# Patient Record
Sex: MALE | Race: WHITE | NOT HISPANIC OR LATINO | Employment: FULL TIME | URBAN - METROPOLITAN AREA
[De-identification: names, ages, dates, MRNs, and addresses within clinical notes are randomized per-mention and may not be internally consistent; named-entity substitution may affect disease eponyms.]

---

## 2017-12-27 ENCOUNTER — OFFICE VISIT (OUTPATIENT)
Dept: FAMILY MEDICINE CLINIC | Facility: CLINIC | Age: 67
End: 2017-12-27

## 2017-12-27 VITALS
SYSTOLIC BLOOD PRESSURE: 122 MMHG | HEART RATE: 96 BPM | DIASTOLIC BLOOD PRESSURE: 70 MMHG | TEMPERATURE: 98.2 F | WEIGHT: 223 LBS | OXYGEN SATURATION: 98 % | BODY MASS INDEX: 30.24 KG/M2

## 2017-12-27 DIAGNOSIS — J10.1 INFLUENZA A: Primary | ICD-10-CM

## 2017-12-27 DIAGNOSIS — F17.210 CIGARETTE SMOKER: ICD-10-CM

## 2017-12-27 PROCEDURE — 99213 OFFICE O/P EST LOW 20 MIN: CPT | Performed by: FAMILY MEDICINE

## 2017-12-27 RX ORDER — OSELTAMIVIR PHOSPHATE 75 MG/1
75 CAPSULE ORAL 2 TIMES DAILY
Qty: 10 CAPSULE | Refills: 0 | Status: SHIPPED | OUTPATIENT
Start: 2017-12-27 | End: 2018-10-16

## 2017-12-27 NOTE — PROGRESS NOTES
HPI  Gallo Leary is a 67 y.o. male who is here for follow up for day history of cough congestion severe body aches.  Is unaware of any definite fever or chills.  No known exposure to influenza or strep infections.  Continues to smoke but has significantly decreased to one pack every 3-4 days?      Review of Systems   Constitutional: Positive for fatigue. Negative for chills, diaphoresis and fever.   HENT: Positive for congestion and sore throat.    Respiratory: Positive for cough.    Musculoskeletal: Positive for myalgias.   Neurological: Positive for weakness.         No past medical history on file.    No past surgical history on file.    No family history on file.    Social History     Social History   • Marital status:      Spouse name: N/A   • Number of children: N/A   • Years of education: N/A     Occupational History   • Not on file.     Social History Main Topics   • Smoking status: Not on file   • Smokeless tobacco: Not on file   • Alcohol use Not on file   • Drug use: Not on file   • Sexual activity: Not on file     Other Topics Concern   • Not on file     Social History Narrative         Physical Exam   Constitutional: He is oriented to person, place, and time. He appears well-developed and well-nourished.   HENT:   Head: Normocephalic.   Mouth/Throat: Oropharynx is clear and moist.   Eyes: Conjunctivae are normal. Pupils are equal, round, and reactive to light.   Neck: Neck supple.   Cardiovascular: Normal rate and regular rhythm.    Pulmonary/Chest: Effort normal and breath sounds normal.   Abdominal: Soft.   Musculoskeletal: He exhibits no edema or deformity.   Lymphadenopathy:     He has no cervical adenopathy.   Neurological: He is alert and oriented to person, place, and time. Coordination normal.   Skin: Skin is warm and dry. No rash noted.   Psychiatric: He has a normal mood and affect. His behavior is normal. Judgment and thought content normal.   Nursing note and vitals  reviewed.        Assessment/Plan    Gallo was seen today for influenza, altered mental status and hearing problem.    Diagnoses and all orders for this visit:    Influenza A  -     oseltamivir (TAMIFLU) 75 MG capsule; Take 1 capsule by mouth 2 (Two) Times a Day.    Cigarette smoker      Patient here with cough congestion and severe body aches which started 4-5 days ago.  Examination is fairly unremarkable and specifically lungs are clear.  Test is positive for influenza a and will give medications noted above.  Otherwise recommend fluids rest and symptomatic therapy, call if worsens or symptoms persist.  Also recommend discontinuing cigarette use.    This note includes information entered using a voice recognition dictation system.  Though reviewed, some nonsensible errors may remain.

## 2018-10-16 ENCOUNTER — OFFICE VISIT (OUTPATIENT)
Dept: FAMILY MEDICINE CLINIC | Facility: CLINIC | Age: 68
End: 2018-10-16

## 2018-10-16 VITALS
BODY MASS INDEX: 29.8 KG/M2 | TEMPERATURE: 97.5 F | HEIGHT: 72 IN | HEART RATE: 72 BPM | DIASTOLIC BLOOD PRESSURE: 80 MMHG | SYSTOLIC BLOOD PRESSURE: 130 MMHG | OXYGEN SATURATION: 99 % | WEIGHT: 220 LBS

## 2018-10-16 DIAGNOSIS — H61.23 BILATERAL IMPACTED CERUMEN: Primary | ICD-10-CM

## 2018-10-16 PROCEDURE — 99212 OFFICE O/P EST SF 10 MIN: CPT | Performed by: FAMILY MEDICINE

## 2018-10-16 NOTE — PROGRESS NOTES
HPI  Gallo Leary is a 68 y.o. male who is here for follow up blockage in both ear canals.  He has had history of excess wax and admits to use of Q-tips occasionally removing large amounts of wax.  Also has some skin lesions on his face and ask about having removed.  Recommend dermatology consultation.      Review of Systems   All other systems reviewed and are negative.        No past medical history on file.    No past surgical history on file.    No family history on file.    Social History     Social History   • Marital status:      Spouse name: N/A   • Number of children: N/A   • Years of education: N/A     Occupational History   • Not on file.     Social History Main Topics   • Smoking status: Not on file   • Smokeless tobacco: Not on file   • Alcohol use Not on file   • Drug use: Unknown   • Sexual activity: Not on file     Other Topics Concern   • Not on file     Social History Narrative   • No narrative on file         Physical Exam   Constitutional: He appears well-developed and well-nourished. No distress.   HENT:   Head: Normocephalic.   Nose: Nose normal.   Mouth/Throat: Oropharynx is clear and moist.   Both ear canals occluded with cerumen   Eyes: Pupils are equal, round, and reactive to light. Conjunctivae are normal.   Cardiovascular: Normal rate.    Pulmonary/Chest: Effort normal. No respiratory distress.   Psychiatric: He has a normal mood and affect. His behavior is normal. Judgment and thought content normal.   Nursing note and vitals reviewed.        Assessment/Plan    Gallo was seen today for ear fullness.    Diagnoses and all orders for this visit:    Bilateral impacted cerumen      Patient presents with ear canals both occluded with cerumen.  Canals irrigated by medical assistance with removal of large amounts of cerumen.  Return if any further difficulties and also should return for routine medical evaluation.    This note includes information entered using a voice recognition  dictation system.  Though reviewed, some nonsensible errors may remain.

## 2019-09-17 ENCOUNTER — OFFICE VISIT (OUTPATIENT)
Dept: FAMILY MEDICINE CLINIC | Facility: CLINIC | Age: 69
End: 2019-09-17

## 2019-09-17 VITALS
TEMPERATURE: 97.8 F | DIASTOLIC BLOOD PRESSURE: 68 MMHG | RESPIRATION RATE: 16 BRPM | HEART RATE: 72 BPM | WEIGHT: 218.4 LBS | SYSTOLIC BLOOD PRESSURE: 120 MMHG | HEIGHT: 72 IN | OXYGEN SATURATION: 98 % | BODY MASS INDEX: 29.58 KG/M2

## 2019-09-17 DIAGNOSIS — F17.210 CIGARETTE SMOKER: ICD-10-CM

## 2019-09-17 DIAGNOSIS — M25.512 ACUTE PAIN OF LEFT SHOULDER: Primary | ICD-10-CM

## 2019-09-17 PROBLEM — M54.50 ACUTE LOW BACK PAIN: Status: ACTIVE | Noted: 2019-09-17

## 2019-09-17 PROBLEM — H61.20 CERUMEN IMPACTION: Status: ACTIVE | Noted: 2019-09-17

## 2019-09-17 PROCEDURE — 99213 OFFICE O/P EST LOW 20 MIN: CPT | Performed by: FAMILY MEDICINE

## 2019-09-17 RX ORDER — METHYLPREDNISOLONE 4 MG/1
TABLET ORAL
Qty: 21 TABLET | Refills: 0 | Status: SHIPPED | OUTPATIENT
Start: 2019-09-17 | End: 2019-10-07

## 2019-09-17 RX ORDER — CYCLOBENZAPRINE HCL 10 MG
TABLET ORAL
Qty: 30 TABLET | Refills: 2 | Status: SHIPPED | OUTPATIENT
Start: 2019-09-17 | End: 2020-05-08

## 2019-09-17 RX ORDER — CYCLOBENZAPRINE HCL 10 MG
TABLET ORAL
Refills: 0 | COMMUNITY
Start: 2019-06-27 | End: 2019-09-17

## 2019-09-17 RX ORDER — IBUPROFEN 800 MG/1
TABLET ORAL
Refills: 0 | COMMUNITY
Start: 2019-06-27 | End: 2019-10-07 | Stop reason: ALTCHOICE
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
## 2019-09-18 ENCOUNTER — HOSPITAL ENCOUNTER (OUTPATIENT)
Dept: GENERAL RADIOLOGY | Facility: HOSPITAL | Age: 69
Discharge: HOME | End: 2019-09-18
Admitting: FAMILY MEDICINE

## 2019-09-18 DIAGNOSIS — M25.512 ACUTE PAIN OF LEFT SHOULDER: ICD-10-CM

## 2019-09-18 PROCEDURE — 73030 X-RAY EXAM OF SHOULDER: CPT

## 2019-09-20 DIAGNOSIS — M25.511 ACUTE PAIN OF RIGHT SHOULDER: Primary | ICD-10-CM

## 2019-09-20 DIAGNOSIS — M25.512 ACUTE PAIN OF LEFT SHOULDER: ICD-10-CM

## 2019-09-20 RX ORDER — HYDROCODONE BITARTRATE AND ACETAMINOPHEN 5; 325 MG/1; MG/1
TABLET ORAL
Qty: 40 TABLET | Refills: 0 | Status: SHIPPED | OUTPATIENT
Start: 2019-09-20 | End: 2019-10-07 | Stop reason: ALTCHOICE

## 2019-10-07 ENCOUNTER — TELEPHONE (OUTPATIENT)
Dept: FAMILY MEDICINE CLINIC | Facility: CLINIC | Age: 69
End: 2019-10-07

## 2019-10-07 DIAGNOSIS — M25.512 LEFT SHOULDER PAIN, UNSPECIFIED CHRONICITY: ICD-10-CM

## 2019-10-07 DIAGNOSIS — M25.512 LEFT SHOULDER PAIN, UNSPECIFIED CHRONICITY: Primary | ICD-10-CM

## 2019-10-07 DIAGNOSIS — M25.512 ACUTE PAIN OF LEFT SHOULDER: ICD-10-CM

## 2019-10-07 RX ORDER — DICLOFENAC SODIUM 75 MG/1
75 TABLET, DELAYED RELEASE ORAL 2 TIMES DAILY
Qty: 30 TABLET | Refills: 1 | Status: SHIPPED | OUTPATIENT
Start: 2019-10-07 | End: 2020-05-08

## 2019-10-07 RX ORDER — HYDROCODONE BITARTRATE AND ACETAMINOPHEN 5; 325 MG/1; MG/1
TABLET ORAL
Qty: 40 TABLET | Refills: 0 | Status: SHIPPED | OUTPATIENT
Start: 2019-10-07 | End: 2019-10-16 | Stop reason: ALTCHOICE

## 2019-10-16 ENCOUNTER — TELEPHONE (OUTPATIENT)
Dept: FAMILY MEDICINE CLINIC | Facility: CLINIC | Age: 69
End: 2019-10-16

## 2019-10-16 DIAGNOSIS — M25.519 SHOULDER PAIN, UNSPECIFIED CHRONICITY, UNSPECIFIED LATERALITY: Primary | ICD-10-CM

## 2019-10-16 RX ORDER — HYDROCODONE BITARTRATE AND ACETAMINOPHEN 10; 325 MG/1; MG/1
1 TABLET ORAL EVERY 4 HOURS PRN
Qty: 40 TABLET | Refills: 0 | Status: SHIPPED | OUTPATIENT
Start: 2019-10-16 | End: 2019-10-29 | Stop reason: SDUPTHER

## 2019-10-16 NOTE — TELEPHONE ENCOUNTER
Previously had to contact patient through Opal, his ex-wife.  Prescription already sent and assume patient will call if he has any further questions?

## 2019-10-16 NOTE — TELEPHONE ENCOUNTER
ZION UPDATED.    Phoned pt and message came up as person your trying to reach has a voice mail that hasn't been set up yet, try your call again later.    Thank you.    ----- Message from Yunier Dominguez MD sent at 10/16/2019 11:52 AM EDT -----  inform patient prescription sent for 10 mg dose.  ----- Message -----  From: Michaela Caro  Sent: 10/16/2019   8:57 AM  To: Yunier Dominguez MD    PT WANTS TO KNOW IF YOU COULD GIVE HIM 7.5 0R 10MG HYDROCODONE HE CAN NOT SLEEP.   HE ALMOST DID NOT MAKE IT IN THE MRI IT WAS HURTING SO BAD.  PHARM#[phone] [facility]  PT'S#[phone]

## 2019-10-29 ENCOUNTER — TELEPHONE (OUTPATIENT)
Dept: FAMILY MEDICINE CLINIC | Facility: CLINIC | Age: 69
End: 2019-10-29

## 2019-10-29 DIAGNOSIS — M25.519 SHOULDER PAIN, UNSPECIFIED CHRONICITY, UNSPECIFIED LATERALITY: ICD-10-CM

## 2019-10-29 RX ORDER — HYDROCODONE BITARTRATE AND ACETAMINOPHEN 10; 325 MG/1; MG/1
1 TABLET ORAL EVERY 4 HOURS PRN
Qty: 40 TABLET | Refills: 0 | Status: SHIPPED | OUTPATIENT
Start: 2019-10-29 | End: 2019-11-08 | Stop reason: SDUPTHER

## 2019-10-29 NOTE — TELEPHONE ENCOUNTER
NO NARC FORMS IN CHART.    No future O.V.    Last O.V. 09/17/2019.  Jeronimo 10/14/2019.  Filled 10/16/2019.        ----- Message from Michaela Caro sent at 10/29/2019  9:18 AM EDT -----  PT NEEDS A REFILL ON  #HYDROCODONE 10/325#40  PHARM#[phone] [facility]  PT'S#[phone]

## 2019-11-08 ENCOUNTER — TELEPHONE (OUTPATIENT)
Dept: FAMILY MEDICINE CLINIC | Facility: CLINIC | Age: 69
End: 2019-11-08

## 2019-11-08 DIAGNOSIS — M25.519 SHOULDER PAIN, UNSPECIFIED CHRONICITY, UNSPECIFIED LATERALITY: ICD-10-CM

## 2019-11-08 RX ORDER — HYDROCODONE BITARTRATE AND ACETAMINOPHEN 10; 325 MG/1; MG/1
1 TABLET ORAL EVERY 4 HOURS PRN
Qty: 40 TABLET | Refills: 0 | Status: SHIPPED | OUTPATIENT
Start: 2019-11-08 | End: 2019-11-18 | Stop reason: SDUPTHER

## 2019-11-08 NOTE — TELEPHONE ENCOUNTER
NO NARC FORMS IN CHART.     No future O.V.     Last O.V. 09/17/2019.  Jeronimo 10/14/2019.  Filled 10/29/2019.    Thank you.       ----- Message from Tracey Moncada sent at 11/8/2019  9:23 AM EST -----  HYDROcodone-acetaminophen (NORCO)  MG per tablet  Sig: Take 1 tablet by mouth Every 4 (Four) Hours As Needed for Moderate Pain  or Severe Pain .      Pharmacy:  [id number] [address] [place] AT [facility] & [facility] - [phone] [facility] [phone] FX

## 2019-11-18 ENCOUNTER — TELEPHONE (OUTPATIENT)
Dept: FAMILY MEDICINE CLINIC | Facility: CLINIC | Age: 69
End: 2019-11-18

## 2019-11-18 DIAGNOSIS — M25.519 SHOULDER PAIN, UNSPECIFIED CHRONICITY, UNSPECIFIED LATERALITY: ICD-10-CM

## 2019-11-18 RX ORDER — HYDROCODONE BITARTRATE AND ACETAMINOPHEN 10; 325 MG/1; MG/1
1 TABLET ORAL EVERY 4 HOURS PRN
Qty: 40 TABLET | Refills: 0 | Status: SHIPPED | OUTPATIENT
Start: 2019-11-18 | End: 2019-12-04 | Stop reason: SDUPTHER

## 2019-11-22 ENCOUNTER — OFFICE VISIT (OUTPATIENT)
Dept: ORTHOPEDIC SURGERY | Facility: CLINIC | Age: 69
End: 2019-11-22

## 2019-11-22 VITALS — BODY MASS INDEX: 30.1 KG/M2 | TEMPERATURE: 98.5 F | WEIGHT: 215 LBS | HEIGHT: 71 IN

## 2019-11-22 DIAGNOSIS — M25.519 NECK AND SHOULDER PAIN: Primary | ICD-10-CM

## 2019-11-22 DIAGNOSIS — M54.2 NECK AND SHOULDER PAIN: Primary | ICD-10-CM

## 2019-11-22 PROCEDURE — 99203 OFFICE O/P NEW LOW 30 MIN: CPT | Performed by: ORTHOPAEDIC SURGERY

## 2019-11-22 RX ORDER — MELOXICAM 15 MG/1
15 TABLET ORAL DAILY PRN
Qty: 30 TABLET | Refills: 2 | Status: SHIPPED | OUTPATIENT
Start: 2019-11-22 | End: 2020-05-08

## 2019-11-22 NOTE — PROGRESS NOTES
Patient: Gallo Leary    YOB: 1950    Medical Record Number: [id number]    Chief Complaints:  Left arm pain    History of Present Illness:     69 y.o. male patient who presents for evaluation of a new complaint of left arm pain.  Patient reports that the symptoms began about a month ago.  Denies any injury, precipitating event or factor.  He reports pain from his neck and upper arm all the way down into his hand.  He reports occasional tingling as well.  He has not noticed any alleviating factors.  He tried Voltaren but it did not really seem to help.  He did have an MRI of his shoulder.  He presents with that available for review.    Allergies: No Known Allergies    Home Medications:      Current Outpatient Medications:   •  HYDROcodone-acetaminophen (NORCO)  MG per tablet, Take 1 tablet by mouth Every 4 (Four) Hours As Needed for Moderate Pain  or Severe Pain ., Disp: 40 tablet, Rfl: 0  •  cyclobenzaprine (FLEXERIL) 10 MG tablet, 1/2-1 tab po qhs, Disp: 30 tablet, Rfl: 2  •  diclofenac (VOLTAREN) 75 MG EC tablet, Take 1 tablet by mouth 2 (Two) Times a Day., Disp: 30 tablet, Rfl: 1  •  meloxicam (MOBIC) 15 MG tablet, Take 1 tablet by mouth Daily As Needed for Moderate Pain . Take as directed with food., Disp: 30 tablet, Rfl: 2    History reviewed. No pertinent past medical history.    History reviewed. No pertinent surgical history.    Social History     Occupational History   • Not on file   Tobacco Use   • Smoking status: Current Every Day Smoker   • Smokeless tobacco: Never Used   Substance and Sexual Activity   • Alcohol use: Yes     Comment: very seldom 1 or twice a year.   • Drug use: No   • Sexual activity: Not Currently      Social History     Social History Narrative   • Not on file       History reviewed. No pertinent family history.    Review of Systems:      Constitutional: Denies fever, shaking or chills   Eyes: Denies change in visual acuity   HEENT: Denies nasal  "congestion or sore throat   Respiratory: Denies cough or shortness of breath   Cardiovascular: Denies chest pain or edema  Endocrine: Denies tremors, palpitations, intolerance of heat or cold, polyuria, polydipsia.  GI: Denies abdominal pain, nausea, vomiting, bloody stools or diarrhea  : Denies frequency, urgency, incontinence, retention, or nocturia.  Musculoskeletal: Denies numbness, tingling or loss of motor function except as above  Integument: Denies rash, lesion or ulceration   Neurologic: Denies headache or focal weakness, deficits  Heme: Denies spontaneous or excessive bleeding, epistaxis, hematuria, melena, fatigue, enlarged or tender lymph nodes.      All other pertinent positives and negatives as noted above in HPI.    Physical Exam: 69 y.o. male    Vitals:    11/22/19 1548   Temp: 98.5 °F (36.9 °C)   TempSrc: Temporal   Weight: 97.5 kg (215 lb)   Height: 180.3 cm (71\")       General:  Patient is awake and alert.  Appears in no acute distress or discomfort.    Psych:  Affect and demeanor are appropriate.    Eyes:  Conjunctiva and sclera appear grossly normal.  Eyes track well and EOM seem to be intact.    Ears:  No gross abnormalities.  Hearing adequate for the exam.    Cardiovascular:  Regular rate and rhythm.    Lungs:  Good chest expansion.  Breathing unlabored.    Lymph:  No palpable masses or adenopathy    Neck: Skin is benign.  No tenderness or step-offs.  His motion is a little limited.  Rotation and lateral flexion are mildly uncomfortable.  Positive Spurling's maneuver to the left    Extremities:  Left shoulder:  Skin is benign.  No gross abnormalities on inspection.  No palpable masses or adenopathy.  No effusion.  No significant tenderness.  Full symmetric motion.  No instability.  Negative Neer, Pérez, Speed's, Yergason's, active compression and O'Briens maneuvers.  He has weakness with shoulder abduction, forward elevation and external rotation.  Mild discomfort with resisted elevation.  " He has totally normal motor sensation in his wrist and hand.  Sensation is intact and subjectively normal.  Palpable radial pulse.  Brisk capillary refill in the fingers with good skin turgor.         Radiology:   Outside x-rays including AP and orthogonal views of the left shoulder are reviewed to evaluate the patient's complaint.  No comparison films are immediately available.  The x-rays show no obvious acute abnormalities, lesions, masses, significant degenerative changes, or other concerning findings.  The acromiohumeral interval is normal.  Glenoid version appears normal as well.    MRI of the left shoulder is reviewed along with the associated report.  He has evidence for a low-grade partial-thickness subscapularis tear.  There is diffuse rotator cuff tendinopathy and mild acromioclavicular arthritis.  No other significant findings are noted.    Assessment/Plan:  1.  Cervical radiculopathy  2.  Left rotator cuff tendinopathy    He has a lot of weakness of his shoulder on exam, much more so than I would expect based on his exam.  Furthermore, his weakness does not seem to really be related to discomfort.  He has a little bit of discomfort with elevation but no significant discomfort with abduction or external rotation, both of which are very weak.  I think this is likely coming from his neck.  We talked about options for him including a trial of conservative treatment versus further work-up with an MRI and a possible referral to Dr. Rader.  For now, he is in no hurry to pursue further work-up.  He would like to try some medicine and therapy first.  I think that is reasonable.  I gave him a prescription for meloxicam to take as needed.  I will have him try that instead of the Voltaren.  Risks were discussed.  I also gave him a referral to therapy.  We will see how he does.  I am going to have him follow-up with me in 6 weeks to check his progress.  If things worsen before then, he needs to notify me and for  further work-up.    Tien José MD    11/22/2019    CC to Yunier Dominguez MD

## 2019-12-04 ENCOUNTER — TELEPHONE (OUTPATIENT)
Dept: FAMILY MEDICINE CLINIC | Facility: CLINIC | Age: 69
End: 2019-12-04

## 2019-12-04 DIAGNOSIS — M25.519 SHOULDER PAIN, UNSPECIFIED CHRONICITY, UNSPECIFIED LATERALITY: ICD-10-CM

## 2019-12-04 RX ORDER — HYDROCODONE BITARTRATE AND ACETAMINOPHEN 10; 325 MG/1; MG/1
1 TABLET ORAL EVERY 4 HOURS PRN
Qty: 40 TABLET | Refills: 0 | Status: SHIPPED | OUTPATIENT
Start: 2019-12-04 | End: 2019-12-12 | Stop reason: SDUPTHER

## 2019-12-09 ENCOUNTER — OFFICE VISIT (OUTPATIENT)
Dept: FAMILY MEDICINE CLINIC | Facility: CLINIC | Age: 69
End: 2019-12-09

## 2019-12-09 VITALS
SYSTOLIC BLOOD PRESSURE: 138 MMHG | WEIGHT: 221 LBS | BODY MASS INDEX: 29.93 KG/M2 | OXYGEN SATURATION: 97 % | HEART RATE: 73 BPM | HEIGHT: 72 IN | RESPIRATION RATE: 16 BRPM | DIASTOLIC BLOOD PRESSURE: 78 MMHG | TEMPERATURE: 97.7 F

## 2019-12-09 DIAGNOSIS — F17.210 CIGARETTE SMOKER: ICD-10-CM

## 2019-12-09 DIAGNOSIS — M54.12 CERVICAL RADICULOPATHY: Primary | ICD-10-CM

## 2019-12-09 PROCEDURE — 99213 OFFICE O/P EST LOW 20 MIN: CPT | Performed by: FAMILY MEDICINE

## 2019-12-09 NOTE — PROGRESS NOTES
HPI  Gallo Leary is a 69 y.o. male who is here for follow up of continued left shoulder pain down all the way to the left hand especially index finger and thumb.  Was seen by orthopedic surgeon who felt pain most likely coming out of his neck.  Continues to work extremely hard at his current job and says he will be forced to retire in the near future.  Continues to smoke though has significantly decreased.  Discussed concerns regarding addiction to pain medication.      Review of Systems   HENT: Positive for ear pain, postnasal drip and rhinorrhea.    Gastrointestinal: Positive for constipation.   Musculoskeletal: Positive for back pain, myalgias and neck pain.   All other systems reviewed and are negative.        No past medical history on file.    No past surgical history on file.    No family history on file.    Social History     Socioeconomic History   • Marital status:      Spouse name: Not on file   • Number of children: Not on file   • Years of education: Not on file   • Highest education level: Not on file   Tobacco Use   • Smoking status: Current Every Day Smoker   • Smokeless tobacco: Never Used   Substance and Sexual Activity   • Alcohol use: Yes     Comment: very seldom 1 or twice a year.   • Drug use: No   • Sexual activity: Not Currently         Physical Exam   Constitutional: He is oriented to person, place, and time. He appears well-developed and well-nourished. No distress.   HENT:   Head: Normocephalic and atraumatic.   Nose: Nose normal.   Mouth/Throat: Oropharynx is clear and moist. No oropharyngeal exudate.   Eyes: Pupils are equal, round, and reactive to light. Conjunctivae and EOM are normal.   Neck: Normal range of motion.   Cardiovascular: Normal rate and regular rhythm.   Pulmonary/Chest: Effort normal. No respiratory distress.   Abdominal: Soft. He exhibits no distension.   Musculoskeletal: Normal range of motion. He exhibits no edema or deformity.   Neurological: He is  alert and oriented to person, place, and time. He exhibits normal muscle tone. Coordination normal.   Skin: Skin is warm and dry.   Psychiatric: He has a normal mood and affect. His behavior is normal. Judgment and thought content normal.   Nursing note and vitals reviewed.        Assessment/Plan    Gallo was seen today for med refill.    Diagnoses and all orders for this visit:    Cervical radiculopathy  -     XR Spine Cervical 2 or 3 View; Future    Cigarette smoker  -     Ambulatory Referral to Multi-Disciplinary Clinic        Patient here for follow-up of continued left shoulder and arm pain.  Again strongly recommend screening chest CT with smoking history and also strong family history of cancer.  Recent orthopedic visit noted and feels most likely pain is coming from cervical radiculopathy.  Will get routine neck x-ray.  Patient is not sure he would tolerate MRI of the neck at this time.  Apparently all of this was discussed with orthopedist.  Again strongly recommend discontinuing cigarette use.  Otherwise continue to monitor closely especially regarding pain medication.  Patient aware of misuse abuse potential.  There is no evidence of either.    This note includes information entered using a voice recognition dictation system.  Though reviewed, some nonsensible errors may remain.

## 2019-12-12 ENCOUNTER — TELEPHONE (OUTPATIENT)
Dept: FAMILY MEDICINE CLINIC | Facility: CLINIC | Age: 69
End: 2019-12-12

## 2019-12-12 DIAGNOSIS — M25.519 SHOULDER PAIN, UNSPECIFIED CHRONICITY, UNSPECIFIED LATERALITY: ICD-10-CM

## 2019-12-12 RX ORDER — HYDROCODONE BITARTRATE AND ACETAMINOPHEN 10; 325 MG/1; MG/1
1 TABLET ORAL EVERY 4 HOURS PRN
Qty: 40 TABLET | Refills: 0 | Status: SHIPPED | OUTPATIENT
Start: 2019-12-12 | End: 2019-12-19 | Stop reason: SDUPTHER

## 2019-12-12 NOTE — TELEPHONE ENCOUNTER
PT NEEDS A REFILL ON:  *HYDROCODONE 10/325#40  PHARM#[id number] [facility]  PT'S#[phone]    LAST OFFICE VISIT 12-9-19  LAST ZION 10-14-19

## 2019-12-16 ENCOUNTER — HOSPITAL ENCOUNTER (OUTPATIENT)
Dept: PHYSICAL THERAPY | Facility: HOSPITAL | Age: 69
Setting detail: THERAPIES SERIES
Discharge: HOME | End: 2019-12-16

## 2019-12-16 DIAGNOSIS — M54.2 NECK PAIN: ICD-10-CM

## 2019-12-16 DIAGNOSIS — M25.512 ACUTE PAIN OF LEFT SHOULDER: Primary | ICD-10-CM

## 2019-12-16 PROCEDURE — 97110 THERAPEUTIC EXERCISES: CPT

## 2019-12-16 PROCEDURE — 97162 PT EVAL MOD COMPLEX 30 MIN: CPT
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
## 2019-12-19 ENCOUNTER — TELEPHONE (OUTPATIENT)
Dept: FAMILY MEDICINE CLINIC | Facility: CLINIC | Age: 69
End: 2019-12-19

## 2019-12-19 DIAGNOSIS — M25.519 SHOULDER PAIN, UNSPECIFIED CHRONICITY, UNSPECIFIED LATERALITY: ICD-10-CM

## 2019-12-19 RX ORDER — HYDROCODONE BITARTRATE AND ACETAMINOPHEN 10; 325 MG/1; MG/1
1 TABLET ORAL EVERY 4 HOURS PRN
Qty: 40 TABLET | Refills: 0 | Status: SHIPPED | OUTPATIENT
Start: 2019-12-19 | End: 2019-12-30 | Stop reason: SDUPTHER

## 2019-12-30 ENCOUNTER — TELEPHONE (OUTPATIENT)
Dept: FAMILY MEDICINE CLINIC | Facility: CLINIC | Age: 69
End: 2019-12-30

## 2019-12-30 DIAGNOSIS — M25.519 SHOULDER PAIN, UNSPECIFIED CHRONICITY, UNSPECIFIED LATERALITY: ICD-10-CM

## 2019-12-30 RX ORDER — HYDROCODONE BITARTRATE AND ACETAMINOPHEN 10; 325 MG/1; MG/1
1 TABLET ORAL EVERY 4 HOURS PRN
Qty: 40 TABLET | Refills: 0 | Status: SHIPPED | OUTPATIENT
Start: 2019-12-30 | End: 2020-01-10 | Stop reason: SDUPTHER

## 2020-01-06 ENCOUNTER — HOSPITAL ENCOUNTER (OUTPATIENT)
Dept: PHYSICAL THERAPY | Facility: HOSPITAL | Age: 70
Setting detail: THERAPIES SERIES
Discharge: HOME | End: 2020-01-06

## 2020-01-06 DIAGNOSIS — M25.512 ACUTE PAIN OF LEFT SHOULDER: Primary | ICD-10-CM

## 2020-01-06 DIAGNOSIS — M54.2 NECK PAIN: ICD-10-CM

## 2020-01-06 PROCEDURE — 97110 THERAPEUTIC EXERCISES: CPT

## 2020-01-06 PROCEDURE — 97140 MANUAL THERAPY 1/> REGIONS: CPT

## 2020-01-06 NOTE — THERAPY TREATMENT NOTE
Outpatient Physical Therapy Ortho Treatment Note  [facility]     Patient Name: Gallo Leary  : 1950  MRN: [id number]  Today's Date: 2020      Visit Date: 2020    Visit Dx:    ICD-10-CM ICD-9-CM   1. Acute pain of left shoulder M25.512 719.41   2. Neck pain M54.2 723.1       Patient Active Problem List   Diagnosis   • Cigarette smoker   • Acute low back pain   • Cerumen impaction        No past medical history on file.     No past surgical history on file.                    PT Assessment/Plan     Row Name 20 1638          PT Assessment    Functional Limitations  Limitation in home management;Performance in leisure activities;Limitations in community activities;Performance in self-care ADL;Limitations in functional capacity and performance  -CN     Impairments  Impaired flexibility;Sensation;Posture;Poor body mechanics;Range of motion;Pain;Joint mobility;Muscle strength  -CN     Assessment Comments  Pt returns for initial follow up after evaluation on 19. Pt reports minimal change in symptoms and intermittnet compliance with HEP. Pt requires many cues for posture and tends to return to forward head/shoulder position in sitting and standing. Pt reports continued numbness into L fingers as well as intermittnet difficulty sleeping. Pt tolerated reclined position today for manual and several exercises and added postural strengthening with cues for correct form. Pt also assisting in care for wife who is undergoing chemo/radiation and currently in ER. Pt would benefit from skilled PT to address the deficits and return to PLOF.   -CN     Please refer to paper survey for additional self-reported information  Yes  -CN     Rehab Potential  Good  -CN     Patient/caregiver participated in establishment of treatment plan and goals  Yes  -CN     Patient would benefit from skilled therapy intervention  Yes  -CN        PT Plan    PT Frequency  2x/week  -CN     Predicted Duration of Therapy  Intervention (Therapy Eval)  4 weeks  -CN     Planned CPT's?  PT RE-EVAL: 35304;PT THER PROC EA 15 MIN: 23877;PT THER ACT EA 15 MIN: 33768;PT MANUAL THERAPY EA 15 MIN: 48235;PT NEUROMUSC RE-EDUCATION EA 15 MIN: 63741;PT HOT OR COLD PACK TREAT MCARE;PT ELECTRICAL STIM UNATTEND: ;PT ULTRASOUND EA 15 MIN: 48308;PT TRACTION CERVICAL: 71523  -CN     PT Plan Comments  Pt to treat 1-2x/week for 4 weeks consisting of ROM, strengthening and flexibility exercises. Assess response to added exercises and update home program next visit.   -CN       User Key  (r) = Recorded By, (t) = Taken By, (c) = Cosigned By    Initials Name Provider Type    CN Karla John, PT Physical Therapist            OP Exercises     Row Name 01/06/20 1600 01/06/20 1500          Subjective Comments    Subjective Comments  It is more stiffness today than anything. I think they are sending my wife to the ER so I have to go there after this.   -CN  --        Subjective Pain    Able to rate subjective pain?  yes  -CN  --     Pre-Treatment Pain Level  5  -CN  --        Total Minutes    68911 - PT Therapeutic Exercise Minutes  25  -CN  --     83131 - PT Manual Therapy Minutes  --  15  -CN        Exercise 1    Exercise Name 1  shoulder rolls  -CN  --     Reps 1  10  -CN  --        Exercise 2    Exercise Name 2  scapular retraction  -CN  --     Reps 2  10  -CN  --     Time 2  3  -CN  --        Exercise 3    Exercise Name 3  HL chin tuck  -CN  --     Cueing 3  Demo  -CN  --     Reps 3  10  -CN  --     Time 3  3  -CN  --     Additional Comments  small range per pain  -CN  --        Exercise 4    Exercise Name 4  seated upper trap stretch  -CN  --     Reps 4  3  -CN  --     Time 4  20  -CN  --        Exercise 5    Exercise Name 5  HL chin tuck with rotation  -CN  --     Cueing 5  Demo  -CN  --     Reps 5  10 ea  -CN  --     Additional Comments  small range  -CN  --        Exercise 6    Exercise Name 6  UBE  -CN  --     Cueing 6  Demo  -CN  --      Time 6  4 min  -CN  --        Exercise 7    Exercise Name 7  Rows  -CN  --     Cueing 7  Demo  -CN  --     Reps 7  10  -CN  --     Additional Comments  RTB, cues for form  -CN  --        Exercise 8    Exercise Name 8  Shoulder ext  -CN  --     Cueing 8  Demo  -CN  --     Reps 8  10  -CN  --     Additional Comments  RTB  -CN  --        Exercise 9    Exercise Name 9  Doorway pec stretch  -CN  --     Cueing 9  Demo  -CN  --     Reps 9  3  -CN  --     Time 9   20 sec  -CN  --        Exercise 10    Exercise Name 10  B UE ER against wall with towel roll  -CN  --     Cueing 10  Demo  -CN  --     Reps 10  10  -CN  --     Additional Comments  RTB  -CN  --       User Key  (r) = Recorded By, (t) = Taken By, (c) = Cosigned By    Initials Name Provider Type    Karla Orellana, PT Physical Therapist                      Manual Rx (last 36 hours)      Manual Treatments     Row Name 01/06/20 1500             Total Minutes    06083 - PT Manual Therapy Minutes  15  -CN         Manual Rx 1    Manual Rx 1 Location  stm to UT, levator, suboccipitals, gentle UT stretching, gentle PA mobs to upper thoracic and cervical spine, distraction  -CN      Manual Rx 1 Duration  Pt in HL position with wedge and pillows  -CN        User Key  (r) = Recorded By, (t) = Taken By, (c) = Cosigned By    Initials Name Provider Type    Karla Orellana, PT Physical Therapist          PT OP Goals     Row Name 01/06/20 1600          PT Short Term Goals    STG Date to Achieve  12/30/19  -CN     STG 1  Pt will demonstrate understanding and compliance with initial HEP.  -CN     STG 1 Progress  Ongoing  -CN     STG 1 Progress Comments  Reviewed current program today, cues given for form.   -CN     STG 2  Pt will report centralization of symptoms from L fingers to L elbow or more proximal.  -CN     STG 2 Progress  Ongoing  -CN     STG 2 Progress Comments  Pt reports no change in peripheral symptoms to L fingers.   -CN        Long Term Goals     LTG Date to Achieve  01/15/20  -CN     LTG 1  Pt will demonstrate tolerance to supine position x 15 minutes without inc L shoulder pain to allow him to tolerate cervical traction.  -CN     LTG 1 Progress  Ongoing  -CN     LTG 1 Progress Comments  Pt able to tolerate reclined positon today.   -CN     LTG 2  Pt will report tolerance to sleeping without waking due to L shoulder pain.  -CN     LTG 2 Progress  Ongoing  -CN     LTG 2 Progress Comments  Pt reports slight improvement in ability to sleep.   -CN     LTG 3  Pt will demonstrate improved LUE  strength from 40# to 60# or better to improve his function.  -CN     LTG 3 Progress  Progressing  -CN     LTG 3 Progress Comments  Pt able to achieve 48# on the L today.   -CN       User Key  (r) = Recorded By, (t) = Taken By, (c) = Cosigned By    Initials Name Provider Type    Karla Orellana, NED Physical Therapist          Therapy Education  Given: HEP, Posture/body mechanics, Symptoms/condition management, Pain management  Program: Progressed  How Provided: Verbal, Demonstration  Provided to: Patient  Level of Understanding: Teach back education performed, Verbalized, Demonstrated              Time Calculation:   Start Time: 1600  Stop Time: 1640  Time Calculation (min): 40 min  Therapy Charges for Today     Code Description Service Date Service Provider Modifiers Qty    63175905970 HC PT THER PROC EA 15 MIN 1/6/2020 Karla John, PT GP 2    18224349422 HC PT MANUAL THERAPY EA 15 MIN 1/6/2020 Karla John, NED GP 1                    Karla John PT  1/6/2020

## 2020-01-08 ENCOUNTER — APPOINTMENT (OUTPATIENT)
Dept: PHYSICAL THERAPY | Facility: HOSPITAL | Age: 70
End: 2020-01-08

## 2020-01-10 ENCOUNTER — TELEPHONE (OUTPATIENT)
Dept: FAMILY MEDICINE CLINIC | Facility: CLINIC | Age: 70
End: 2020-01-10

## 2020-01-10 DIAGNOSIS — M25.519 SHOULDER PAIN, UNSPECIFIED CHRONICITY, UNSPECIFIED LATERALITY: ICD-10-CM

## 2020-01-10 RX ORDER — HYDROCODONE BITARTRATE AND ACETAMINOPHEN 10; 325 MG/1; MG/1
1 TABLET ORAL EVERY 4 HOURS PRN
Qty: 40 TABLET | Refills: 0 | Status: SHIPPED | OUTPATIENT
Start: 2020-01-10 | End: 2020-01-20 | Stop reason: SDUPTHER

## 2020-01-15 ENCOUNTER — APPOINTMENT (OUTPATIENT)
Dept: PHYSICAL THERAPY | Facility: HOSPITAL | Age: 70
End: 2020-01-15

## 2020-01-20 ENCOUNTER — HOSPITAL ENCOUNTER (OUTPATIENT)
Dept: PHYSICAL THERAPY | Facility: HOSPITAL | Age: 70
Setting detail: THERAPIES SERIES
Discharge: HOME | End: 2020-01-20

## 2020-01-20 ENCOUNTER — TELEPHONE (OUTPATIENT)
Dept: FAMILY MEDICINE CLINIC | Facility: CLINIC | Age: 70
End: 2020-01-20

## 2020-01-20 DIAGNOSIS — M25.519 SHOULDER PAIN, UNSPECIFIED CHRONICITY, UNSPECIFIED LATERALITY: ICD-10-CM

## 2020-01-20 DIAGNOSIS — M25.512 ACUTE PAIN OF LEFT SHOULDER: Primary | ICD-10-CM

## 2020-01-20 DIAGNOSIS — M54.2 NECK PAIN: ICD-10-CM

## 2020-01-20 PROCEDURE — 97140 MANUAL THERAPY 1/> REGIONS: CPT

## 2020-01-20 PROCEDURE — 97110 THERAPEUTIC EXERCISES: CPT

## 2020-01-20 RX ORDER — HYDROCODONE BITARTRATE AND ACETAMINOPHEN 10; 325 MG/1; MG/1
1 TABLET ORAL EVERY 4 HOURS PRN
Qty: 40 TABLET | Refills: 0 | Status: SHIPPED | OUTPATIENT
Start: 2020-01-20 | End: 2020-01-29 | Stop reason: SDUPTHER

## 2020-01-20 NOTE — TELEPHONE ENCOUNTER
PT NEEDS A REFILL ON:  *HYDROCODONE 10/325#40  PHARM#[id number] [facility]  PT'S#[phone]    PHYSICAL THERAPY SAID HE HAS ARTHRITIS IN THE NECK IS THERE ANY MEDICATION TO HELP WITH TO HELP WITH ARTHRITIS?  PLEASE ADVISE  CALL [phone].

## 2020-01-20 NOTE — THERAPY TREATMENT NOTE
Outpatient Physical Therapy Ortho Treatment Note  [facility]     Patient Name: Gallo Leary  : 1950  MRN: [id number]  Today's Date: 2020      Visit Date: 2020    Visit Dx:    ICD-10-CM ICD-9-CM   1. Acute pain of left shoulder M25.512 719.41   2. Neck pain M54.2 723.1       Patient Active Problem List   Diagnosis   • Cigarette smoker   • Acute low back pain   • Cerumen impaction        No past medical history on file.     No past surgical history on file.                    PT Assessment/Plan     Row Name 20 1717          PT Assessment    Assessment Comments  Pt reports continued numbness/tingling into hand/fingers and requires cues for erect posture/correct exercise mechanics. Pt with continued difficulty tolerating supine position and requires wedge during manual treatment due to discomfort.   -CN        PT Plan    PT Plan Comments  Continue with current program and update HEP next visit if warranted.   -CN       User Key  (r) = Recorded By, (t) = Taken By, (c) = Cosigned By    Initials Name Provider Type    Karla Orellana, PT Physical Therapist            OP Exercises     Row Name 20 1600 20 1500          Subjective Comments    Subjective Comments  I think it is doing better. My hand feels cold sometimes. I just have a constant, dull ache and I think I am having more instances of tinngling in the thumb/finger.   -CN  --        Subjective Pain    Able to rate subjective pain?  yes  -CN  --     Pre-Treatment Pain Level  8  -CN  --        Total Minutes    01028 - PT Therapeutic Exercise Minutes  25  -CN  --     20466 - PT Manual Therapy Minutes  --  15  -CN        Exercise 1    Exercise Name 1  shoulder rolls  -CN  --     Reps 1  10  -CN  --        Exercise 2    Exercise Name 2  scapular retraction  -CN  --     Reps 2  10  -CN  --     Time 2  3  -CN  --        Exercise 3    Exercise Name 3  HL chin tuck  -CN  --     Cueing 3  Demo  -CN  --     Reps 3  10   -CN  --     Time 3  3  -CN  --     Additional Comments  small range per pain  -CN  --        Exercise 4    Exercise Name 4  seated upper trap stretch  -CN  --     Reps 4  3  -CN  --     Time 4  20  -CN  --        Exercise 5    Exercise Name 5  HL chin tuck with rotation  -CN  --     Cueing 5  Demo  -CN  --     Reps 5  10 ea  -CN  --     Additional Comments  small range  -CN  --        Exercise 6    Exercise Name 6  UBE  -CN  --     Cueing 6  Demo  -CN  --     Time 6  4 min  -CN  --        Exercise 7    Exercise Name 7  Rows  -CN  --     Cueing 7  Demo  -CN  --     Reps 7  10  -CN  --     Additional Comments  RTB, cues for form  -CN  --        Exercise 8    Exercise Name 8  Shoulder ext  -CN  --     Cueing 8  Demo  -CN  --     Reps 8  10  -CN  --     Additional Comments  RTB  -CN  --        Exercise 9    Exercise Name 9  Doorway pec stretch  -CN  --     Cueing 9  Demo  -CN  --     Reps 9  3  -CN  --     Time 9   20 sec  -CN  --        Exercise 10    Exercise Name 10  B UE ER against wall with towel roll  -CN  --     Cueing 10  Demo  -CN  --     Reps 10  10  -CN  --     Additional Comments  RTB  -CN  --       User Key  (r) = Recorded By, (t) = Taken By, (c) = Cosigned By    Initials Name Provider Type    Karla Orellana, PT Physical Therapist                      Manual Rx (last 36 hours)      Manual Treatments     Row Name 01/20/20 1500             Total Minutes    70559 - PT Manual Therapy Minutes  15  -CN         Manual Rx 1    Manual Rx 1 Location  stm to UT, levator, suboccipitals, gentle UT stretching, gentle PA mobs to upper thoracic and cervical spine, distraction  -CN      Manual Rx 1 Type  pt in HLing  -CN        User Key  (r) = Recorded By, (t) = Taken By, (c) = Cosigned By    Initials Name Provider Type    Karla Orellana, NED Physical Therapist          PT OP Goals     Row Name 01/20/20 1600          PT Short Term Goals    STG Date to Achieve  12/30/19  -CN     STG 1  Pt will  demonstrate understanding and compliance with initial HEP.  -CN     STG 1 Progress  Ongoing  -CN     STG 1 Progress Comments  Requires cues for form at times.   -CN     STG 2  Pt will report centralization of symptoms from L fingers to L elbow or more proximal.  -CN     STG 2 Progress  Ongoing  -CN     STG 2 Progress Comments  Continues to have pain into L finger/thumb.   -CN        Long Term Goals    LTG Date to Achieve  01/15/20  -CN     LTG 1  Pt will demonstrate tolerance to supine position x 15 minutes without inc L shoulder pain to allow him to tolerate cervical traction.  -CN     LTG 1 Progress  Ongoing  -CN     LTG 2  Pt will report tolerance to sleeping without waking due to L shoulder pain.  -CN     LTG 2 Progress  Ongoing  -CN     LTG 3  Pt will demonstrate improved LUE  strength from 40# to 60# or better to improve his function.  -CN     LTG 3 Progress  Progressing  -CN       User Key  (r) = Recorded By, (t) = Taken By, (c) = Cosigned By    Initials Name Provider Type    Karla Orellana, PT Physical Therapist          Therapy Education  Given: HEP, Posture/body mechanics, Symptoms/condition management, Pain management  Program: Progressed  How Provided: Verbal, Demonstration  Provided to: Patient  Level of Understanding: Teach back education performed, Verbalized, Demonstrated              Time Calculation:   Start Time: 1600  Stop Time: 1640  Time Calculation (min): 40 min  Therapy Charges for Today     Code Description Service Date Service Provider Modifiers Qty    86956030167  PT THER PROC EA 15 MIN 1/20/2020 Karla John, PT GP 2    01882550769 HC PT MANUAL THERAPY EA 15 MIN 1/20/2020 Karla John, PT GP 1                    Karla John PT  1/20/2020

## 2020-01-22 ENCOUNTER — APPOINTMENT (OUTPATIENT)
Dept: PHYSICAL THERAPY | Facility: HOSPITAL | Age: 70
End: 2020-01-22

## 2020-01-27 ENCOUNTER — HOSPITAL ENCOUNTER (OUTPATIENT)
Dept: PHYSICAL THERAPY | Facility: HOSPITAL | Age: 70
Setting detail: THERAPIES SERIES
Discharge: HOME | End: 2020-01-27

## 2020-01-27 DIAGNOSIS — M25.512 ACUTE PAIN OF LEFT SHOULDER: Primary | ICD-10-CM

## 2020-01-27 DIAGNOSIS — M54.2 NECK PAIN: ICD-10-CM

## 2020-01-27 PROCEDURE — 97110 THERAPEUTIC EXERCISES: CPT

## 2020-01-27 PROCEDURE — 97140 MANUAL THERAPY 1/> REGIONS: CPT

## 2020-01-27 NOTE — THERAPY TREATMENT NOTE
Outpatient Physical Therapy Ortho Treatment Note  [facility]     Patient Name: Gallo Leary  : 1950  MRN: [id number]  Today's Date: 2020      Visit Date: 2020    Visit Dx:    ICD-10-CM ICD-9-CM   1. Acute pain of left shoulder M25.512 719.41   2. Neck pain M54.2 723.1       Patient Active Problem List   Diagnosis   • Cigarette smoker   • Acute low back pain   • Cerumen impaction        No past medical history on file.     No past surgical history on file.                    PT Assessment/Plan     Row Name 20 1632          PT Assessment    Assessment Comments  Pt reports aching in L shoulder, increased over the weekend. Pt tolerated progression of exercises, including addition of weights and increased bands with minimal exacerbation of symptoms. Pt reports relief of symptoms with manual distraction, however likely unable to tolerate mechanical traction due to supine position.   -CN        PT Plan    PT Plan Comments  Continue to progress postural strengthening as tolerated.   -CN       User Key  (r) = Recorded By, (t) = Taken By, (c) = Cosigned By    Initials Name Provider Type    Karla Orellana, PT Physical Therapist            OP Exercises     Row Name 20 1600 20 1500          Subjective Comments    Subjective Comments  The tingling got better over the weekend, but it was replaced by aching.   -CN  --        Subjective Pain    Able to rate subjective pain?  yes  -CN  --     Pre-Treatment Pain Level  7  -CN  --        Total Minutes    12516 - PT Therapeutic Exercise Minutes  25  -CN  --     94643 - PT Manual Therapy Minutes  --  15  -CN        Exercise 1    Exercise Name 1  shoulder rolls  -CN  --     Reps 1  10  -CN  --     Additional Comments  2#  -CN  --        Exercise 3    Exercise Name 3  HL chin tuck  -CN  --     Cueing 3  Demo  -CN  --     Reps 3  10  -CN  --     Time 3  3  -CN  --     Additional Comments  small range per pain  -CN  --         Exercise 4    Exercise Name 4  seated upper trap stretch  -CN  --     Reps 4  3  -CN  --     Time 4  20  -CN  --        Exercise 5    Exercise Name 5  HL chin tuck with rotation  -CN  --     Cueing 5  Demo  -CN  --     Reps 5  10 ea  -CN  --     Additional Comments  small range per pain  -CN  --        Exercise 6    Exercise Name 6  UBE  -CN  --     Cueing 6  Demo  -CN  --     Time 6  4 min  -CN  --        Exercise 7    Exercise Name 7  Rows  -CN  --     Cueing 7  Demo  -CN  --     Reps 7  10  -CN  --     Additional Comments  GTB, cues for form  -CN  --        Exercise 8    Exercise Name 8  Shoulder ext  -CN  --     Cueing 8  Demo  -CN  --     Reps 8  10  -CN  --     Additional Comments  RTB  -CN  --        Exercise 10    Exercise Name 10  B UE ER against wall with noodle  -CN  --     Cueing 10  Demo  -CN  --     Reps 10  15  -CN  --     Additional Comments  RTB  -CN  --        Exercise 11    Exercise Name 11  Seated mob with movement, towel cervical rotation  -CN  --     Cueing 11  Demo  -CN  --     Reps 11  10   -CN  --     Time 11  5 sec  -CN  --       User Key  (r) = Recorded By, (t) = Taken By, (c) = Cosigned By    Initials Name Provider Type    Karla Orellana, PT Physical Therapist                      Manual Rx (last 36 hours)      Manual Treatments     Row Name 01/27/20 1500             Total Minutes    13793 - PT Manual Therapy Minutes  15  -CN         Manual Rx 1    Manual Rx 1 Location  stm to UT, levator, suboccipitals, gentle UT stretching, gentle PA mobs to upper thoracic and cervical spine, distraction  -CN      Manual Rx 1 Duration  Pt in HL position with wedge and pillows  -CN        User Key  (r) = Recorded By, (t) = Taken By, (c) = Cosigned By    Initials Name Provider Type    Karla Orellana, NED Physical Therapist          PT OP Goals     Row Name 01/27/20 1600          PT Short Term Goals    STG Date to Achieve  12/30/19  -CN     STG 1  Pt will demonstrate understanding  and compliance with initial HEP.  -CN     STG 1 Progress  Ongoing  -CN     STG 2  Pt will report centralization of symptoms from L fingers to L elbow or more proximal.  -CN     STG 2 Progress  Ongoing  -CN        Long Term Goals    LTG Date to Achieve  01/15/20  -CN     LTG 1  Pt will demonstrate tolerance to supine position x 15 minutes without inc L shoulder pain to allow him to tolerate cervical traction.  -CN     LTG 1 Progress  Ongoing  -CN     LTG 2  Pt will report tolerance to sleeping without waking due to L shoulder pain.  -CN     LTG 2 Progress  Ongoing  -CN     LTG 3  Pt will demonstrate improved LUE  strength from 40# to 60# or better to improve his function.  -CN     LTG 3 Progress  Progressing  -CN       User Key  (r) = Recorded By, (t) = Taken By, (c) = Cosigned By    Initials Name Provider Type    Karla Orellana, PT Physical Therapist          Therapy Education  Given: HEP, Posture/body mechanics, Symptoms/condition management, Pain management  Program: Progressed  How Provided: Verbal, Demonstration  Provided to: Patient  Level of Understanding: Teach back education performed, Verbalized, Demonstrated              Time Calculation:   Start Time: 1600  Stop Time: 1640  Time Calculation (min): 40 min  Therapy Charges for Today     Code Description Service Date Service Provider Modifiers Qty    34095342351 HC PT THER PROC EA 15 MIN 1/27/2020 Karla John, PT GP 2    35676457205 HC PT MANUAL THERAPY EA 15 MIN 1/27/2020 Karla John, PT GP 1                    Karla John, PT  1/27/2020

## 2020-01-29 ENCOUNTER — APPOINTMENT (OUTPATIENT)
Dept: PHYSICAL THERAPY | Facility: HOSPITAL | Age: 70
End: 2020-01-29

## 2020-01-29 ENCOUNTER — TELEPHONE (OUTPATIENT)
Dept: FAMILY MEDICINE CLINIC | Facility: CLINIC | Age: 70
End: 2020-01-29

## 2020-01-29 DIAGNOSIS — M25.519 SHOULDER PAIN, UNSPECIFIED CHRONICITY, UNSPECIFIED LATERALITY: ICD-10-CM

## 2020-01-29 RX ORDER — HYDROCODONE BITARTRATE AND ACETAMINOPHEN 10; 325 MG/1; MG/1
1 TABLET ORAL EVERY 4 HOURS PRN
Qty: 40 TABLET | Refills: 0 | Status: SHIPPED | OUTPATIENT
Start: 2020-01-29 | End: 2020-02-07 | Stop reason: SDUPTHER

## 2020-01-29 NOTE — TELEPHONE ENCOUNTER
HYDROcodone-acetaminophen (NORCO)  MG per tablet   Sig: Take 1 tablet by mouth Every 4 (Four) Hours As Needed for Moderate Pain  or Severe Pain .    [facility] #[id number] - [facility] [address] AT [facility] & [facility] - [phone] [facility] [phone] FX

## 2020-02-03 ENCOUNTER — HOSPITAL ENCOUNTER (OUTPATIENT)
Dept: PHYSICAL THERAPY | Facility: HOSPITAL | Age: 70
Setting detail: THERAPIES SERIES
Discharge: HOME | End: 2020-02-03

## 2020-02-03 DIAGNOSIS — M54.2 NECK PAIN: ICD-10-CM

## 2020-02-03 DIAGNOSIS — M25.512 ACUTE PAIN OF LEFT SHOULDER: Primary | ICD-10-CM

## 2020-02-03 PROCEDURE — 97110 THERAPEUTIC EXERCISES: CPT

## 2020-02-03 PROCEDURE — 97140 MANUAL THERAPY 1/> REGIONS: CPT

## 2020-02-04 NOTE — THERAPY TREATMENT NOTE
Outpatient Physical Therapy Ortho Treatment Note  [facility]     Patient Name: Gallo Leary  : 1950  MRN: [id number]  Today's Date: 2/3/2020      Visit Date: 2020    Visit Dx:    ICD-10-CM ICD-9-CM   1. Acute pain of left shoulder M25.512 719.41   2. Neck pain M54.2 723.1       Patient Active Problem List   Diagnosis   • Cigarette smoker   • Acute low back pain   • Cerumen impaction        No past medical history on file.     No past surgical history on file.                    PT Assessment/Plan     Row Name 20 1600          PT Assessment    Assessment Comments  Pt with continued pain in cervical spine worse with rotation. Tolerating exercises well and able to progress to standing HA without inc in symptoms. Pt would benefit from continued skilled PT to advance mobility and postural strengthening in order to reduce pain with ADLs and recreational tasks.   -CN        PT Plan    PT Plan Comments  Continue with postural strengthening as able.   -CN       User Key  (r) = Recorded By, (t) = Taken By, (c) = Cosigned By    Initials Name Provider Type    Karla Orellana, PT Physical Therapist            OP Exercises     Row Name 20 1600 20 1500          Subjective Comments    Subjective Comments  It has been hurting today.   -CN  --        Subjective Pain    Able to rate subjective pain?  yes  -CN  --     Pre-Treatment Pain Level  5  -CN  --        Total Minutes    21620 - PT Therapeutic Exercise Minutes  25  -CN  --     79683 - PT Manual Therapy Minutes  --  15  -CN        Exercise 1    Exercise Name 1  shoulder rolls  -CN  --     Reps 1  10  -CN  --     Additional Comments  2#  -CN  --        Exercise 3    Exercise Name 3  HL chin tuck  -CN  --     Cueing 3  Demo  -CN  --     Reps 3  10  -CN  --     Time 3  3  -CN  --     Additional Comments  small range per pain  -CN  --        Exercise 4    Exercise Name 4  seated upper trap stretch  -CN  --     Reps 4  3  -CN   --     Time 4  20  -CN  --     Additional Comments  R only, tinging with L  -CN  --        Exercise 5    Exercise Name 5  HL chin tuck with rotation  -CN  --     Cueing 5  Demo  -CN  --     Reps 5  10 ea  -CN  --     Additional Comments  small range per pain  -CN  --        Exercise 6    Exercise Name 6  UBE  -CN  --     Cueing 6  Demo  -CN  --     Time 6  4 min  -CN  --        Exercise 7    Exercise Name 7  Rows  -CN  --     Cueing 7  Demo  -CN  --     Reps 7  10  -CN  --     Additional Comments  GTB, cues for form  -CN  --        Exercise 8    Exercise Name 8  Shoulder ext  -CN  --     Cueing 8  Demo  -CN  --     Reps 8  10  -CN  --     Additional Comments  GTB  -CN  --        Exercise 9    Exercise Name 9  HA against wall with noodle  -CN  --     Cueing 9  Demo  -CN  --     Reps 9  10  -CN  --     Additional Comments  RTB  -CN  --        Exercise 10    Exercise Name 10  B UE ER against wall with noodle  -CN  --     Cueing 10  Demo  -CN  --     Reps 10  15  -CN  --     Additional Comments  GTB  -CN  --        Exercise 11    Exercise Name 11  Seated mob with movement, towel cervical rotation  -CN  --     Cueing 11  Demo  -CN  --     Reps 11  10   -CN  --     Time 11  5 sec  -CN  --       User Key  (r) = Recorded By, (t) = Taken By, (c) = Cosigned By    Initials Name Provider Type    Karla Orellana, NED Physical Therapist                      Manual Rx (last 36 hours)      Manual Treatments     Row Name 02/03/20 1500             Total Minutes    21360 - PT Manual Therapy Minutes  15  -CN         Manual Rx 1    Manual Rx 1 Location  stm to UT, levator, suboccipitals, gentle UT stretching, gentle PA mobs to upper thoracic and cervical spine, distraction  -CN      Manual Rx 1 Duration  Pt in HL position with wedge and pillows  -CN        User Key  (r) = Recorded By, (t) = Taken By, (c) = Cosigned By    Initials Name Provider Type    Karla Orellana PT Physical Therapist          PT OP Goals      Row Name 02/03/20 1600          PT Short Term Goals    STG Date to Achieve  12/30/19  -CN     STG 1  Pt will demonstrate understanding and compliance with initial HEP.  -CN     STG 1 Progress  Met  -CN     STG 2  Pt will report centralization of symptoms from L fingers to L elbow or more proximal.  -CN     STG 2 Progress  Ongoing  -CN     STG 2 Progress Comments  Continues with radicular symptoms, however reports improvement in mobility overall.   -CN        Long Term Goals    LTG Date to Achieve  01/15/20  -CN     LTG 1  Pt will demonstrate tolerance to supine position x 15 minutes without inc L shoulder pain to allow him to tolerate cervical traction.  -CN     LTG 1 Progress  Ongoing  -CN     LTG 2  Pt will report tolerance to sleeping without waking due to L shoulder pain.  -CN     LTG 2 Progress  Ongoing  -CN     LTG 3  Pt will demonstrate improved LUE  strength from 40# to 60# or better to improve his function.  -CN     LTG 3 Progress  Progressing  -CN       User Key  (r) = Recorded By, (t) = Taken By, (c) = Cosigned By    Initials Name Provider Type    Karla Orellana, NED Physical Therapist          Therapy Education  Given: HEP, Posture/body mechanics, Symptoms/condition management, Pain management  Program: Progressed  How Provided: Verbal, Demonstration  Provided to: Patient  Level of Understanding: Teach back education performed, Verbalized, Demonstrated              Time Calculation:   Start Time: 1602  Stop Time: 1642  Time Calculation (min): 40 min  Therapy Charges for Today     Code Description Service Date Service Provider Modifiers Qty    32230860750  PT THER PROC EA 15 MIN 2/3/2020 Karla John, PT GP 2    26680780853 HC PT MANUAL THERAPY EA 15 MIN 2/3/2020 Karla John PT GP 1                    Karla John PT  2/3/2020

## 2020-02-05 ENCOUNTER — APPOINTMENT (OUTPATIENT)
Dept: PHYSICAL THERAPY | Facility: HOSPITAL | Age: 70
End: 2020-02-05

## 2020-02-06 ENCOUNTER — TELEPHONE (OUTPATIENT)
Dept: FAMILY MEDICINE CLINIC | Facility: CLINIC | Age: 70
End: 2020-02-06

## 2020-02-06 NOTE — TELEPHONE ENCOUNTER
HYDROcodone-acetaminophen (NORCO)  MG per tablet   Sig: Take 1 tablet by mouth Every 4 (Four) Hours As Needed for Moderate Pain  or Severe Pain .     [facility] #[id number] - [facility] [address] AT [facility] & [facility] - [phone] [facility] [phone] FX

## 2020-02-07 DIAGNOSIS — M25.519 SHOULDER PAIN, UNSPECIFIED CHRONICITY, UNSPECIFIED LATERALITY: ICD-10-CM

## 2020-02-07 RX ORDER — HYDROCODONE BITARTRATE AND ACETAMINOPHEN 10; 325 MG/1; MG/1
1 TABLET ORAL EVERY 4 HOURS PRN
Qty: 40 TABLET | Refills: 0 | Status: SHIPPED | OUTPATIENT
Start: 2020-02-07 | End: 2020-02-17 | Stop reason: SDUPTHER

## 2020-02-17 DIAGNOSIS — M25.519 SHOULDER PAIN, UNSPECIFIED CHRONICITY, UNSPECIFIED LATERALITY: ICD-10-CM

## 2020-02-17 RX ORDER — HYDROCODONE BITARTRATE AND ACETAMINOPHEN 10; 325 MG/1; MG/1
1 TABLET ORAL EVERY 4 HOURS PRN
Qty: 40 TABLET | Refills: 0 | Status: SHIPPED | OUTPATIENT
Start: 2020-02-17 | End: 2020-02-26 | Stop reason: SDUPTHER

## 2020-02-26 ENCOUNTER — TELEPHONE (OUTPATIENT)
Dept: FAMILY MEDICINE CLINIC | Facility: CLINIC | Age: 70
End: 2020-02-26

## 2020-02-26 DIAGNOSIS — M25.519 SHOULDER PAIN, UNSPECIFIED CHRONICITY, UNSPECIFIED LATERALITY: ICD-10-CM

## 2020-02-26 RX ORDER — HYDROCODONE BITARTRATE AND ACETAMINOPHEN 10; 325 MG/1; MG/1
1 TABLET ORAL EVERY 4 HOURS PRN
Qty: 40 TABLET | Refills: 0 | Status: SHIPPED | OUTPATIENT
Start: 2020-02-26 | End: 2020-03-06 | Stop reason: SDUPTHER

## 2020-02-26 NOTE — TELEPHONE ENCOUNTER
Hydrocodone  mg take one tab po every 4 hours as needed for pain #40     rx 2/17/20    grady 1/30/2020    Ov 12/9/19    [facility] #[id number] - [place], [place] - [address] AT [facility] - [phone] [facility] [phone] FX

## 2020-03-04 ENCOUNTER — APPOINTMENT (OUTPATIENT)
Dept: PHYSICAL THERAPY | Facility: HOSPITAL | Age: 70
End: 2020-03-04

## 2020-03-06 DIAGNOSIS — M25.519 SHOULDER PAIN, UNSPECIFIED CHRONICITY, UNSPECIFIED LATERALITY: ICD-10-CM

## 2020-03-06 RX ORDER — HYDROCODONE BITARTRATE AND ACETAMINOPHEN 10; 325 MG/1; MG/1
1 TABLET ORAL EVERY 4 HOURS PRN
Qty: 40 TABLET | Refills: 0 | Status: SHIPPED | OUTPATIENT
Start: 2020-03-06 | End: 2020-03-16 | Stop reason: SDUPTHER

## 2020-03-16 DIAGNOSIS — M25.519 SHOULDER PAIN, UNSPECIFIED CHRONICITY, UNSPECIFIED LATERALITY: ICD-10-CM

## 2020-03-16 RX ORDER — HYDROCODONE BITARTRATE AND ACETAMINOPHEN 10; 325 MG/1; MG/1
1 TABLET ORAL EVERY 4 HOURS PRN
Qty: 40 TABLET | Refills: 0 | Status: SHIPPED | OUTPATIENT
Start: 2020-03-16 | End: 2020-03-26 | Stop reason: SDUPTHER

## 2020-03-18 ENCOUNTER — APPOINTMENT (OUTPATIENT)
Dept: PHYSICAL THERAPY | Facility: HOSPITAL | Age: 70
End: 2020-03-18

## 2020-03-23 ENCOUNTER — APPOINTMENT (OUTPATIENT)
Dept: PHYSICAL THERAPY | Facility: HOSPITAL | Age: 70
End: 2020-03-23

## 2020-03-26 DIAGNOSIS — M25.519 SHOULDER PAIN, UNSPECIFIED CHRONICITY, UNSPECIFIED LATERALITY: ICD-10-CM

## 2020-03-27 RX ORDER — HYDROCODONE BITARTRATE AND ACETAMINOPHEN 10; 325 MG/1; MG/1
1 TABLET ORAL EVERY 4 HOURS PRN
Qty: 40 TABLET | Refills: 0 | Status: SHIPPED | OUTPATIENT
Start: 2020-03-27 | End: 2020-04-06 | Stop reason: SDUPTHER

## 2020-03-30 ENCOUNTER — APPOINTMENT (OUTPATIENT)
Dept: PHYSICAL THERAPY | Facility: HOSPITAL | Age: 70
End: 2020-03-30

## 2020-04-06 DIAGNOSIS — M25.519 SHOULDER PAIN, UNSPECIFIED CHRONICITY, UNSPECIFIED LATERALITY: ICD-10-CM

## 2020-04-06 RX ORDER — HYDROCODONE BITARTRATE AND ACETAMINOPHEN 10; 325 MG/1; MG/1
1 TABLET ORAL EVERY 4 HOURS PRN
Qty: 40 TABLET | Refills: 0 | Status: SHIPPED | OUTPATIENT
Start: 2020-04-06 | End: 2020-04-16 | Stop reason: SDUPTHER

## 2020-04-06 NOTE — TELEPHONE ENCOUNTER
PATIENT NEEDS REFILL ON NORCO     LAST SEEN 12/09/2019    LAST FILL 3/27/2020    LAST ZION 4/6/2020

## 2020-04-07 ENCOUNTER — TELEPHONE (OUTPATIENT)
Dept: FAMILY MEDICINE CLINIC | Facility: CLINIC | Age: 70
End: 2020-04-07

## 2020-04-16 DIAGNOSIS — M25.519 SHOULDER PAIN, UNSPECIFIED CHRONICITY, UNSPECIFIED LATERALITY: ICD-10-CM

## 2020-04-16 NOTE — TELEPHONE ENCOUNTER
PT CALLED REQUESTING A REFILL FOR HYDROcodone-acetaminophen (NORCO)  MG per tablet    JULIETTE CONFIRMED     PT CALL BACK   [phone]

## 2020-04-17 RX ORDER — HYDROCODONE BITARTRATE AND ACETAMINOPHEN 10; 325 MG/1; MG/1
1 TABLET ORAL EVERY 4 HOURS PRN
Qty: 40 TABLET | Refills: 0 | Status: SHIPPED | OUTPATIENT
Start: 2020-04-17 | End: 2020-04-27 | Stop reason: SDUPTHER

## 2020-04-27 DIAGNOSIS — M25.519 SHOULDER PAIN, UNSPECIFIED CHRONICITY, UNSPECIFIED LATERALITY: ICD-10-CM

## 2020-04-27 NOTE — TELEPHONE ENCOUNTER
PATIENT CALLED FOR MED REFILL    HYDROcodone-acetaminophen (NORCO)  MG per tablet    [facility] #[id number] - [place], [place] - [address] AT [facility] - [phone]  - [phone] FX  [phone]    PATIENT CALL BACK NUMBER [phone]    HE IS OUT OF MEDICATION

## 2020-04-27 NOTE — TELEPHONE ENCOUNTER
PT CALLED TO REQUEST REFILLS OF:     HYDROcodone-acetaminophen (NORCO)  MG per tablet    PREFERRED PHARMACY:   [facility] #[id number] [facility] [address] AT [facility] & [address] - [phone] [facility] [phone] FX

## 2020-04-29 RX ORDER — HYDROCODONE BITARTRATE AND ACETAMINOPHEN 10; 325 MG/1; MG/1
1 TABLET ORAL EVERY 4 HOURS PRN
Qty: 40 TABLET | Refills: 0 | Status: SHIPPED | OUTPATIENT
Start: 2020-04-29 | End: 2020-05-08 | Stop reason: SDUPTHER

## 2020-05-06 ENCOUNTER — HOSPITAL ENCOUNTER (OUTPATIENT)
Dept: PHYSICAL THERAPY | Facility: HOSPITAL | Age: 70
Setting detail: THERAPIES SERIES
Discharge: HOME | End: 2020-05-06

## 2020-05-06 DIAGNOSIS — M25.512 ACUTE PAIN OF LEFT SHOULDER: Primary | ICD-10-CM

## 2020-05-06 DIAGNOSIS — M54.2 NECK PAIN: ICD-10-CM

## 2020-05-06 PROCEDURE — 97110 THERAPEUTIC EXERCISES: CPT | Performed by: PHYSICAL THERAPIST

## 2020-05-06 NOTE — THERAPY RE-EVALUATION
Outpatient Physical Therapy Ortho Re-Evaluation  [facility]     Patient Name: Gallo Leary  : 1950  MRN: [id number]  Today's Date: 2020      Visit Date: 2020    Patient Active Problem List   Diagnosis   • Cigarette smoker   • Acute low back pain   • Cerumen impaction        No past medical history on file.     No past surgical history on file.    Visit Dx:     ICD-10-CM ICD-9-CM   1. Acute pain of left shoulder M25.512 719.41   2. Neck pain M54.2 723.1             PT Ortho     Row Name 20 1600        Strength Right    # Reps  3  -GR    Right Rung  2  -GR    Right  Test 1  81  -GR    Right  Test 2  78  -GR    Right  Test 3  60  -GR     Strength Average Right  73  -GR        Strength Left    # Reps  3  -GR    Left Rung  2  -GR    Left  Test 1  70  -GR    Left  Test 2  60  -GR    Left  Test 3  58  -GR     Strength Average Left  62.67  -GR       Hand  Strength     Strength Affected Side  Right;Left  -GR      User Key  (r) = Recorded By, (t) = Taken By, (c) = Cosigned By    Initials Name Provider Type    Christian Le, PT Physical Therapist                            PT OP Goals     Row Name 20 1600          PT Short Term Goals    STG Date to Achieve  19  -GR     STG 1  Pt will demonstrate understanding and compliance with initial HEP.  -GR     STG 1 Progress  Met  -GR     STG 2  Pt will report centralization of symptoms from L fingers to L elbow or more proximal.  -GR     STG 2 Progress  Met  -GR        Long Term Goals    LTG Date to Achieve  01/15/20  -GR     LTG 1  Pt will demonstrate tolerance to supine position x 15 minutes without inc L shoulder pain to allow him to tolerate cervical traction.  -GR     LTG 1 Progress  Progressing  -GR     LTG 2  Pt will report tolerance to sleeping without waking due to L shoulder pain.  -GR     LTG 2 Progress  Progressing  -GR     LTG 3  Pt will demonstrate improved LUE   strength from 40# to 60# or better to improve his function.  -GR     LTG 3 Progress  Met  -GR     LTG 3 Progress Comments  62#  -GR       User Key  (r) = Recorded By, (t) = Taken By, (c) = Cosigned By    Initials Name Provider Type    Christian Le, PT Physical Therapist          PT Assessment/Plan     Row Name 05/06/20 1619          PT Assessment    Assessment Comments  Patient returns for his 6th visit with a lapse in care due to COVID.  He demonstrates  improvement from 40# to 62#.  Pain is centralized now to the L shoulder, was previously in the hand. He continues to rely on pain medicaiton to sleep.  Recommend resume PT at 1x/week for 2-3 visits.  -GR        PT Plan    PT Plan Comments  Continue 1x/week x 2-3visits. Consider traction again now that tolerating supine for longer.  -GR       User Key  (r) = Recorded By, (t) = Taken By, (c) = Cosigned By    Initials Name Provider Type    Christian Le, PT Physical Therapist            OP Exercises     Row Name 05/06/20 1600             Subjective Comments    Subjective Comments  Patient reports since last visit he is pretty much the same. Medication helps him sleep and the hands feel OK but the pain from the shoulder down the arm is irritating.  -GR         Subjective Pain    Able to rate subjective pain?  yes  -GR      Pre-Treatment Pain Level  5  -GR         Total Minutes    74325 - PT Therapeutic Exercise Minutes  29  -GR         Exercise 3    Exercise Name 3  standing retraction with ball behind head  -GR      Cueing 3  Demo  -GR      Sets 3  1  -GR      Reps 3  10  -GR      Time 3  5 seconds  -GR      Additional Comments  c/spine  -GR         Exercise 5    Exercise Name 5  standing retraction with rotation and ball behind head  -GR      Cueing 5  Demo  -GR      Sets 5  1  -GR      Reps 5  10  -GR      Time 5  5 seconds  -GR      Additional Comments  c/spine  -GR         Exercise 12    Exercise Name 12  Doorway stretch  -GR      Cueing 12   Demo  -GR      Reps 12  3  -GR      Time 12  20 seconds  -GR        User Key  (r) = Recorded By, (t) = Taken By, (c) = Cosigned By    Initials Name Provider Type    GR Christian Durán, PT Physical Therapist                                  Time Calculation:     Start Time: 1600  Stop Time: 1629  Time Calculation (min): 29 min  Total Timed Code Minutes- PT: 29 minute(s)     Therapy Charges for Today     Code Description Service Date Service Provider Modifiers Qty    [id number]  PT THER PROC EA 15 MIN 5/6/2020 Christian Durán, PT GP 2                    Christian Durán, PT  5/6/2020

## 2020-05-07 DIAGNOSIS — M25.519 SHOULDER PAIN, UNSPECIFIED CHRONICITY, UNSPECIFIED LATERALITY: ICD-10-CM

## 2020-05-07 NOTE — TELEPHONE ENCOUNTER
Patient called requesting refills for HYDROcodone-acetaminophen (NORCO)  MG per tablet    Patient callback [phone]      Pharmacy confirmed

## 2020-05-08 ENCOUNTER — OFFICE VISIT (OUTPATIENT)
Dept: FAMILY MEDICINE CLINIC | Facility: CLINIC | Age: 70
End: 2020-05-08

## 2020-05-08 DIAGNOSIS — M25.519 SHOULDER PAIN, UNSPECIFIED CHRONICITY, UNSPECIFIED LATERALITY: ICD-10-CM

## 2020-05-08 PROCEDURE — 99441 PR PHYS/QHP TELEPHONE EVALUATION 5-10 MIN: CPT | Performed by: FAMILY MEDICINE

## 2020-05-08 RX ORDER — HYDROCODONE BITARTRATE AND ACETAMINOPHEN 10; 325 MG/1; MG/1
1 TABLET ORAL EVERY 4 HOURS PRN
Qty: 40 TABLET | Refills: 0 | Status: SHIPPED | OUTPATIENT
Start: 2020-05-08 | End: 2020-05-15 | Stop reason: SDUPTHER

## 2020-05-08 RX ORDER — HYDROCODONE BITARTRATE AND ACETAMINOPHEN 10; 325 MG/1; MG/1
1 TABLET ORAL EVERY 4 HOURS PRN
Qty: 40 TABLET | Refills: 0 | OUTPATIENT
Start: 2020-05-08

## 2020-05-08 NOTE — PROGRESS NOTES
HPI  Gallo Leary is a 70 y.o. male telephone visit for continuation of pain medication for shoulder pain.  Continues to get physical therapy and now pain is up in the shoulder area.  Is able to limit use of pain medication such that can continue to work.  Has discontinued NSAIDs because not effective.  Muscle relaxers caused too much sedation in the mornings and had minimal effect also.      Review of Systems   Constitutional: Negative for chills and fever.   Musculoskeletal: Positive for neck pain.   All other systems reviewed and are negative.        No past medical history on file.    No past surgical history on file.    No family history on file.    Social History     Socioeconomic History   • Marital status:      Spouse name: Not on file   • Number of children: Not on file   • Years of education: Not on file   • Highest education level: Not on file   Tobacco Use   • Smoking status: Current Every Day Smoker   • Smokeless tobacco: Never Used   Substance and Sexual Activity   • Alcohol use: Yes     Comment: very seldom 1 or twice a year.   • Drug use: No   • Sexual activity: Not Currently         Physical Exam   Constitutional: He is oriented to person, place, and time. No distress.   Pulmonary/Chest: No respiratory distress.   Neurological: He is alert and oriented to person, place, and time.   Psychiatric: He has a normal mood and affect. His speech is normal and behavior is normal. Judgment and thought content normal. Cognition and memory are normal. He is attentive.         Assessment/Plan    Gallo was seen today for follow-up and med refill.    Diagnoses and all orders for this visit:    Shoulder pain, unspecified chronicity, unspecified laterality  -     HYDROcodone-acetaminophen (NORCO)  MG per tablet; Take 1 tablet by mouth Every 4 (Four) Hours As Needed for Moderate Pain  or Severe Pain .        Telephone follow-up for continued shoulder pain.  Pain persists and continues with  physical therapy.  Apparently some further maneuvers are planned as well as change in home treatment plans.  Patient continues to work and requires occasional pain medication especially at night to sleep.  Overall seems stable on current regimen.  There is no evidence of abuse nor misuse of medication which will be continued with close monitoring including follow-up visits every 3 months.  Jeronimo screens and drug screen will be obtained as indicated.    This visit has been rescheduled as a phone visit to comply with patient safety concerns in accordance with CDC recommendations. Total time of discussion was 6 minutes.

## 2020-05-15 DIAGNOSIS — M25.519 SHOULDER PAIN, UNSPECIFIED CHRONICITY, UNSPECIFIED LATERALITY: ICD-10-CM

## 2020-05-15 RX ORDER — HYDROCODONE BITARTRATE AND ACETAMINOPHEN 10; 325 MG/1; MG/1
1 TABLET ORAL EVERY 4 HOURS PRN
Qty: 40 TABLET | Refills: 0 | Status: SHIPPED | OUTPATIENT
Start: 2020-05-15 | End: 2020-05-22 | Stop reason: SDUPTHER

## 2020-05-15 NOTE — TELEPHONE ENCOUNTER
PATIENT WAS CALLING TO REQUEST HIS REFILL OF THE   HYDROcodone-acetaminophen (NORCO)  MG per tablet     PATIENT STATED REFILL QUANTITY IS 40   PILLS.   PATIENT HAS 3 LEFT     PATIENT C/B # [phone]     PATIENT CONFIRMED   [facility] #[id number] - [place], [place] - [address] AT SEC OF [facility] - [phone]  - [phone] FX

## 2020-05-20 ENCOUNTER — APPOINTMENT (OUTPATIENT)
Dept: PHYSICAL THERAPY | Facility: HOSPITAL | Age: 70
End: 2020-05-20

## 2020-05-22 DIAGNOSIS — M25.519 SHOULDER PAIN, UNSPECIFIED CHRONICITY, UNSPECIFIED LATERALITY: ICD-10-CM

## 2020-05-22 NOTE — TELEPHONE ENCOUNTER
Patient is calling for a refill of the following:    HYDROcodone-acetaminophen (NORCO)  MG per tablet    [address] confirmed    Patient call back [phone]

## 2020-05-26 ENCOUNTER — TELEPHONE (OUTPATIENT)
Dept: FAMILY MEDICINE CLINIC | Facility: CLINIC | Age: 70
End: 2020-05-26

## 2020-05-26 RX ORDER — HYDROCODONE BITARTRATE AND ACETAMINOPHEN 10; 325 MG/1; MG/1
1 TABLET ORAL EVERY 4 HOURS PRN
Qty: 40 TABLET | Refills: 0 | Status: SHIPPED | OUTPATIENT
Start: 2020-05-26 | End: 2020-06-01 | Stop reason: SDUPTHER

## 2020-05-26 NOTE — TELEPHONE ENCOUNTER
PATIENT CALLED FOR MED REFILL  HE HAS CALLED ON 5/15/2020 AND 5/22/2020   HYDROcodone-acetaminophen (NORCO)  MG per tablet    PHARMACY STATES IT HAS NOT BEEN CALLED IN.        HE IS OUT OF MEDICATION  [facility] #[id number] - [facility] [address] AT [facility] - [phone]  - [phone] FX  [phone]    PATIENT CALL BACK NUMBER [phone]

## 2020-05-26 NOTE — TELEPHONE ENCOUNTER
NOTIFIED PT THAT IT WAS FILLED TODAY   Color consistent with ethnicity/race, warm, dry intact, resilient.

## 2020-05-27 ENCOUNTER — HOSPITAL ENCOUNTER (OUTPATIENT)
Dept: PHYSICAL THERAPY | Facility: HOSPITAL | Age: 70
Setting detail: THERAPIES SERIES
Discharge: HOME | End: 2020-05-27

## 2020-05-27 DIAGNOSIS — M25.512 ACUTE PAIN OF LEFT SHOULDER: Primary | ICD-10-CM

## 2020-05-27 DIAGNOSIS — M54.2 NECK PAIN: ICD-10-CM

## 2020-05-27 PROCEDURE — 97012 MECHANICAL TRACTION THERAPY: CPT | Performed by: PHYSICAL THERAPIST

## 2020-05-27 NOTE — THERAPY TREATMENT NOTE
Outpatient Physical Therapy Ortho Treatment Note  [facility]     Patient Name: Gallo Leary  : 1950  MRN: [id number]  Today's Date: 2020      Visit Date: 2020    Visit Dx:    ICD-10-CM ICD-9-CM   1. Acute pain of left shoulder M25.512 719.41   2. Neck pain M54.2 723.1       Patient Active Problem List   Diagnosis   • Cigarette smoker   • Acute low back pain   • Cerumen impaction        No past medical history on file.     No past surgical history on file.                    PT Assessment/Plan     Row Name 20 1614          PT Assessment    Assessment Comments  Pain remains centralized however is increasingly irritable at the [facility].   Patient did trial cervical traction today which made his neck no worse. He is hesitant about plans to continue and wants to see any carryover and then call to return or not. If he does not return then an MD visit is advised for further intervention.  -GR        PT Plan    PT Plan Comments  If patient returns continue with traction/HEP focus as tolerated. May benefit from ortho/injection if not.  -GR       User Key  (r) = Recorded By, (t) = Taken By, (c) = Cosigned By    Initials Name Provider Type    Christian Le, PT Physical Therapist          Modalities     Row Name 20 1500             Subjective Comments    Subjective Comments  Patient states he is in heightened pain todayin his L shoulder. Having trouble sleeping feels like maybe he pulled a muscle.  -GR         Subjective Pain    Able to rate subjective pain?  yes  -GR      Pre-Treatment Pain Level  7  -GR         Traction 79376    Traction Type  Cervical  -GR      Rx Minutes  10  -GR      Duration  Intermittent  -GR      Position  Other 90/90  -GR      Weight  15 0  -GR      Hold  45  -GR      Relax  15  -GR        User Key  (r) = Recorded By, (t) = Taken By, (c) = Cosigned By    Initials Name Provider Type    Christian Le, PT Physical Therapist        OP Exercises     Row  Name 05/27/20 1600 05/27/20 1500          Subjective Comments    Subjective Comments  --  Patient states he is in heightened pain todayin his L shoulder. Having trouble sleeping feels like maybe he pulled a muscle.  -GR        Subjective Pain    Able to rate subjective pain?  --  yes  -GR     Pre-Treatment Pain Level  --  7  -GR        Exercise 12    Exercise Name 12  Doorway stretch  -GR  --     Cueing 12  Demo  -GR  --     Reps 12  3  -GR  --     Time 12  20 seconds  -GR  --       User Key  (r) = Recorded By, (t) = Taken By, (c) = Cosigned By    Initials Name Provider Type    GR Christian Durán, PT Physical Therapist                                          Time Calculation:   Start Time: 1605  Stop Time: 1623  Time Calculation (min): 18 min  Total Timed Code Minutes- PT: 0 minute(s)  Therapy Charges for Today     Code Description Service Date Service Provider Modifiers Qty    28257026411  PT-TRACTION MECHANICAL 5/27/2020 Christian Durán, PT  1                    Christian Durán, NED  5/27/2020

## 2020-06-01 DIAGNOSIS — M25.519 SHOULDER PAIN, UNSPECIFIED CHRONICITY, UNSPECIFIED LATERALITY: ICD-10-CM

## 2020-06-01 NOTE — TELEPHONE ENCOUNTER
Medication Refill Request      Patient Name:  Gallo Leary    :  1950    MRN:  [id number]      Patient would like the medication refilled below:  Requested Prescriptions     Pending Prescriptions Disp Refills   • HYDROcodone-acetaminophen (NORCO)  MG per tablet 40 tablet 0     Sig: Take 1 tablet by mouth Every 4 (Four) Hours As Needed for Moderate Pain  or Severe Pain .        Patient's Pharmacy: [facility] #[id number] [place] [address] AT SEC OF [facility] & [facility] - [phone] [facility] [phone] FX   How many doses left: 1  Best call back number: [phone]

## 2020-06-02 DIAGNOSIS — M25.519 SHOULDER PAIN, UNSPECIFIED CHRONICITY, UNSPECIFIED LATERALITY: ICD-10-CM

## 2020-06-02 RX ORDER — HYDROCODONE BITARTRATE AND ACETAMINOPHEN 10; 325 MG/1; MG/1
1 TABLET ORAL EVERY 4 HOURS PRN
Qty: 40 TABLET | Refills: 0 | OUTPATIENT
Start: 2020-06-02

## 2020-06-02 RX ORDER — HYDROCODONE BITARTRATE AND ACETAMINOPHEN 10; 325 MG/1; MG/1
1 TABLET ORAL EVERY 4 HOURS PRN
Qty: 40 TABLET | Refills: 0 | Status: SHIPPED | OUTPATIENT
Start: 2020-06-02 | End: 2020-06-10 | Stop reason: SDUPTHER

## 2020-06-02 NOTE — TELEPHONE ENCOUNTER
PT IS CALLING IN STATING THAT HE CALLED YESTERDAY ABOUT FILLING HIS PRESCRIPTION AND THE PHARMACY LET  HIM KNOW THAT THEY DON'T HAVE ANYTHING FOR HIM TO .  PT STATES THAT HE TOOK HIS LAST DOSE OF MEDICINE LAST NIGHT AND IS NOW TOTALLY OUT OF     HYDROcodone-acetaminophen (NORCO)  MG per tablet    PHARMACY CONFIRMED  CALL BACK

## 2020-06-08 DIAGNOSIS — M25.519 SHOULDER PAIN, UNSPECIFIED CHRONICITY, UNSPECIFIED LATERALITY: ICD-10-CM

## 2020-06-08 RX ORDER — HYDROCODONE BITARTRATE AND ACETAMINOPHEN 10; 325 MG/1; MG/1
1 TABLET ORAL EVERY 4 HOURS PRN
Qty: 40 TABLET | Refills: 0 | Status: CANCELLED | OUTPATIENT
Start: 2020-06-08

## 2020-06-08 NOTE — TELEPHONE ENCOUNTER
Patient called in to request a medication refill be called in to the pharmacy.    Medicine: HYDROcodone-acetaminophen (NORCO)  MG per tablet    Pharmacy: [facility] #[id number] - [place] [address] AT [facility] - [phone]  - [phone] FX  [phone]    Best Callback: [phone]    Patient Notes: n/a

## 2020-06-08 NOTE — TELEPHONE ENCOUNTER
PT CALLED STATING HE WILL BE OUT OF MEDICATION TOMORROW AND NEEDS A REFILL.     PLEASE ADVISE     PT CALL BACK   [phone]

## 2020-06-10 ENCOUNTER — TELEPHONE (OUTPATIENT)
Dept: FAMILY MEDICINE CLINIC | Facility: CLINIC | Age: 70
End: 2020-06-10

## 2020-06-10 DIAGNOSIS — M25.519 SHOULDER PAIN, UNSPECIFIED CHRONICITY, UNSPECIFIED LATERALITY: ICD-10-CM

## 2020-06-10 RX ORDER — HYDROCODONE BITARTRATE AND ACETAMINOPHEN 10; 325 MG/1; MG/1
1 TABLET ORAL EVERY 4 HOURS PRN
Qty: 40 TABLET | Refills: 0 | Status: SHIPPED | OUTPATIENT
Start: 2020-06-10 | End: 2020-06-12 | Stop reason: SDUPTHER

## 2020-06-10 NOTE — TELEPHONE ENCOUNTER
PT HAS BEEN CALLING SINCE Monday NEEDS REFILL ON:  *HYDROCODONE 10/325#40  PHARM#[phone] [place]  PT'S#[phone]

## 2020-06-12 DIAGNOSIS — M25.519 SHOULDER PAIN, UNSPECIFIED CHRONICITY, UNSPECIFIED LATERALITY: ICD-10-CM

## 2020-06-12 NOTE — TELEPHONE ENCOUNTER
Patient called and stated that he tries to call in his HYDROcodone-acetaminophen (NORCO)  MG per tablet  before he runs out. Patient will be out Tuesday.     Please advise     [phone]

## 2020-06-15 RX ORDER — HYDROCODONE BITARTRATE AND ACETAMINOPHEN 10; 325 MG/1; MG/1
1 TABLET ORAL EVERY 4 HOURS PRN
Qty: 40 TABLET | Refills: 0 | Status: SHIPPED | OUTPATIENT
Start: 2020-06-15 | End: 2020-06-22 | Stop reason: SDUPTHER

## 2020-06-17 ENCOUNTER — HOSPITAL ENCOUNTER (OUTPATIENT)
Dept: PHYSICAL THERAPY | Facility: HOSPITAL | Age: 70
Setting detail: THERAPIES SERIES
Discharge: HOME | End: 2020-06-17

## 2020-06-17 DIAGNOSIS — M25.512 ACUTE PAIN OF LEFT SHOULDER: Primary | ICD-10-CM

## 2020-06-17 DIAGNOSIS — M54.2 NECK PAIN: ICD-10-CM

## 2020-06-17 PROCEDURE — 97012 MECHANICAL TRACTION THERAPY: CPT | Performed by: PHYSICAL THERAPIST

## 2020-06-19 ENCOUNTER — TELEPHONE (OUTPATIENT)
Dept: FAMILY MEDICINE CLINIC | Facility: CLINIC | Age: 70
End: 2020-06-19

## 2020-06-19 DIAGNOSIS — M25.519 SHOULDER PAIN, UNSPECIFIED CHRONICITY, UNSPECIFIED LATERALITY: ICD-10-CM

## 2020-06-19 RX ORDER — HYDROCODONE BITARTRATE AND ACETAMINOPHEN 10; 325 MG/1; MG/1
1 TABLET ORAL EVERY 4 HOURS PRN
Qty: 40 TABLET | Refills: 0 | Status: CANCELLED | OUTPATIENT
Start: 2020-06-19

## 2020-06-19 NOTE — TELEPHONE ENCOUNTER
PATIENT CALLED IN TO REQUEST A REFILL OF HYDROcodone-acetaminophen (NORCO)  MG per tablet  PATIENT HAS THREE PILLS LEFT . PLEASE SEND TO [facility] #[id number] - [place], [place] - [address] AT SEC OF [facility] - [phone]  - [phone] FX  [phone] PATIENT CALL BACK [phone]

## 2020-06-22 DIAGNOSIS — M25.519 SHOULDER PAIN, UNSPECIFIED CHRONICITY, UNSPECIFIED LATERALITY: ICD-10-CM

## 2020-06-22 RX ORDER — HYDROCODONE BITARTRATE AND ACETAMINOPHEN 10; 325 MG/1; MG/1
1 TABLET ORAL EVERY 4 HOURS PRN
Qty: 40 TABLET | Refills: 0 | Status: SHIPPED | OUTPATIENT
Start: 2020-06-22 | End: 2020-06-26 | Stop reason: SDUPTHER

## 2020-06-26 DIAGNOSIS — M25.519 SHOULDER PAIN, UNSPECIFIED CHRONICITY, UNSPECIFIED LATERALITY: ICD-10-CM

## 2020-06-26 RX ORDER — HYDROCODONE BITARTRATE AND ACETAMINOPHEN 10; 325 MG/1; MG/1
1 TABLET ORAL EVERY 4 HOURS PRN
Qty: 40 TABLET | Refills: 0 | Status: SHIPPED | OUTPATIENT
Start: 2020-06-26 | End: 2020-07-06 | Stop reason: SDUPTHER

## 2020-06-26 NOTE — TELEPHONE ENCOUNTER
Caller: Gallo Leary    Relationship: Self    Best call back number: [phone]    Medication needed:   Requested Prescriptions     Pending Prescriptions Disp Refills   • HYDROcodone-acetaminophen (NORCO)  MG per tablet 40 tablet 0     Sig: Take 1 tablet by mouth Every 4 (Four) Hours As Needed for Moderate Pain  or Severe Pain .       When do you need the refill by: 06/29/2020    What details did the patient provide when requesting the medication: PATIENT SAID NEEDS THIS FILLED BY Monday 06/29/2020. STATED HAS ENOUGH TO LAST UNTIL THEN.      Does the patient have less than a 3 day supply:  [x] Yes  [] No    What is the patient's preferred pharmacy:       [facility] #[id number] - [facility] [address] AT [facility] OF [facility]([address] & [facility] - [phone] [facility] [phone] FX

## 2020-07-01 ENCOUNTER — HOSPITAL ENCOUNTER (OUTPATIENT)
Dept: PHYSICAL THERAPY | Facility: HOSPITAL | Age: 70
Setting detail: THERAPIES SERIES
Discharge: HOME | End: 2020-07-01

## 2020-07-01 DIAGNOSIS — M54.2 NECK PAIN: Primary | ICD-10-CM

## 2020-07-01 DIAGNOSIS — M25.512 ACUTE PAIN OF LEFT SHOULDER: ICD-10-CM

## 2020-07-01 PROCEDURE — 97012 MECHANICAL TRACTION THERAPY: CPT | Performed by: PHYSICAL THERAPIST

## 2020-07-01 PROCEDURE — 97110 THERAPEUTIC EXERCISES: CPT | Performed by: PHYSICAL THERAPIST

## 2020-07-01 NOTE — THERAPY TREATMENT NOTE
Outpatient Physical Therapy Ortho Treatment Note  [facility]     Patient Name: Gallo Leary  : 1950  MRN: [id number]  Today's Date: 2020      Visit Date: 2020    Visit Dx:    ICD-10-CM ICD-9-CM   1. Neck pain M54.2 723.1   2. Acute pain of left shoulder M25.512 719.41       Patient Active Problem List   Diagnosis   • Cigarette smoker   • Acute low back pain   • Cerumen impaction        No past medical history on file.     No past surgical history on file.                    PT Assessment/Plan     Row Name 20 1555          PT Assessment    Assessment Comments  Pain reduced today. Able to increase exercises and reviewed importance of postural breaks while returning to [facility]. Motivation level is improved and thus a more appropriate PT candidate going forward.  -GR        PT Plan    PT Plan Comments  Continue POC.  -GR       User Key  (r) = Recorded By, (t) = Taken By, (c) = Cosigned By    Initials Name Provider Type    Christian Le, PT Physical Therapist          Modalities     Row Name 20 1500             Traction 72839    Traction Type  Cervical  -GR      Rx Minutes  15  -GR      Duration  Intermittent  -GR      Position  Other 90/90  -GR      Weight  18 0  -GR      Hold  45  -GR      Relax  15  -GR        User Key  (r) = Recorded By, (t) = Taken By, (c) = Cosigned By    Initials Name Provider Type    Christian Le, PT Physical Therapist        OP Exercises     Row Name 20 1500             Subjective Comments    Subjective Comments  Reporting great relief since last visit. Doing HEP more regularly and not waking with pain.  -GR         Subjective Pain    Able to rate subjective pain?  yes  -GR      Pre-Treatment Pain Level  4  -GR         Total Minutes    15980 - PT Therapeutic Exercise Minutes  15  -GR         Exercise 1    Exercise Name 1  UBE  -GR      Time 1  3 min  -GR      Additional Comments  L1  -GR         Exercise 2    Exercise Name 2   Doorway stretch  -GR      Cueing 2  Demo  -GR      Sets 2  1  -GR      Reps 2  3  -GR      Time 2  20 seconds  -GR         Exercise 3    Exercise Name 3  Reverse shoulder rolls  -GR      Cueing 3  Demo  -GR      Sets 3  1  -GR      Reps 3  15  -GR         Exercise 4    Exercise Name 4  Scapular retraction  -GR      Cueing 4  Demo  -GR      Sets 4  1  -GR      Reps 4  15  -GR         Exercise 5    Exercise Name 5  standing retraction into pillow  -GR      Cueing 5  Demo  -GR      Sets 5  1  -GR      Reps 5  10  -GR      Time 5  5 sec  -GR        User Key  (r) = Recorded By, (t) = Taken By, (c) = Cosigned By    Initials Name Provider Type    Christian Le, PT Physical Therapist                       PT OP Goals     Row Name 07/01/20 1500          PT Short Term Goals    STG Date to Achieve  12/30/19  -GR     STG 1  Pt will demonstrate understanding and compliance with initial HEP.  -GR     STG 1 Progress  Met  -GR     STG 2  Pt will report centralization of symptoms from L fingers to L elbow or more proximal.  -GR     STG 2 Progress  Met  -GR        Long Term Goals    LTG Date to Achieve  01/15/20  -GR     LTG 1  Pt will demonstrate tolerance to supine position x 15 minutes without inc L shoulder pain to allow him to tolerate cervical traction.  -GR     LTG 1 Progress  Progressing  -GR     LTG 2  Pt will report tolerance to sleeping without waking due to L shoulder pain.  -GR     LTG 2 Progress  Progressing  -GR     LTG 3  Pt will demonstrate improved LUE  strength from 40# to 60# or better to improve his function.  -GR     LTG 3 Progress  Met  -GR       User Key  (r) = Recorded By, (t) = Taken By, (c) = Cosigned By    Initials Name Provider Type    Christian Le, PT Physical Therapist          Therapy Education  Education Details: review of postural exercises for breaks during [employer] playing              Time Calculation:   Start Time: 1510  Stop Time: 1540  Time Calculation (min): 30 min  Total  Timed Code Minutes- PT: 15 minute(s)  Therapy Charges for Today     Code Description Service Date Service Provider Modifiers Qty    93722790533 HC PT THER PROC EA 15 MIN 7/1/2020 Christian Durán, PT GP 1    31399285635 HC PT-TRACTION MECHANICAL 7/1/2020 Christian Durán, PT  1                    Christian Durán, PT  7/1/2020

## 2020-07-06 DIAGNOSIS — M25.519 SHOULDER PAIN, UNSPECIFIED CHRONICITY, UNSPECIFIED LATERALITY: ICD-10-CM

## 2020-07-06 RX ORDER — HYDROCODONE BITARTRATE AND ACETAMINOPHEN 10; 325 MG/1; MG/1
1 TABLET ORAL EVERY 4 HOURS PRN
Qty: 40 TABLET | Refills: 0 | Status: SHIPPED | OUTPATIENT
Start: 2020-07-06 | End: 2020-07-13 | Stop reason: SDUPTHER

## 2020-07-06 NOTE — TELEPHONE ENCOUNTER
Patient called and requested refill for HYDROcodone-acetaminophen (NORCO)  MG per tablet be sent to     [facility] #[id number] - [place], [place] - [address] AT [facility] OF [facility]([address] - [phone]  - [phone] FX     Patient is completely out       Call back  [phone]

## 2020-07-08 ENCOUNTER — HOSPITAL ENCOUNTER (OUTPATIENT)
Dept: PHYSICAL THERAPY | Facility: HOSPITAL | Age: 70
Setting detail: THERAPIES SERIES
Discharge: HOME | End: 2020-07-08

## 2020-07-08 PROCEDURE — 97110 THERAPEUTIC EXERCISES: CPT | Performed by: PHYSICAL THERAPIST

## 2020-07-08 PROCEDURE — 97012 MECHANICAL TRACTION THERAPY: CPT | Performed by: PHYSICAL THERAPIST

## 2020-07-08 NOTE — THERAPY TREATMENT NOTE
Outpatient Physical Therapy Ortho Treatment Note  [facility]     Patient Name: Gallo Leary  : 1950  MRN: [id number]  Today's Date: 2020      Visit Date: 2020    Visit Dx:  No diagnosis found.    Patient Active Problem List   Diagnosis   • Cigarette smoker   • Acute low back pain   • Cerumen impaction        No past medical history on file.     No past surgical history on file.                    PT Assessment/Plan     Row Name 20 1645          PT Assessment    Assessment Comments  Overall poor tolerance to therex and traction this visit due to L arm pain.  -GR        PT Plan    PT Plan Comments  If better resume POC.  -GR       User Key  (r) = Recorded By, (t) = Taken By, (c) = Cosigned By    Initials Name Provider Type    Christian Le, PT Physical Therapist          Modalities     Row Name 20 1500             Traction 02358    Traction Type  Cervical  -GR      Rx Minutes  15  -GR      Duration  Intermittent  -GR      Position  Other 90/90  -GR      Weight  20 10  -GR      Hold  45  -GR      Relax  15  -GR        User Key  (r) = Recorded By, (t) = Taken By, (c) = Cosigned By    Initials Name Provider Type    Christian Le, PT Physical Therapist        OP Exercises     Row Name 20 1500             Subjective Comments    Subjective Comments  Pain is worse today and he cannot correlate a reason.  Did do well with guitar playing after last session.  -GR         Subjective Pain    Able to rate subjective pain?  yes  -GR      Pre-Treatment Pain Level  8  -GR         Total Minutes    59005 - PT Therapeutic Exercise Minutes  15  -GR         Exercise 1    Exercise Name 1  UBE  -GR      Time 1  4 min  -GR      Additional Comments  L1  -GR         Exercise 2    Exercise Name 2  Doorway stretch  -GR      Cueing 2  Demo  -GR      Sets 2  1  -GR      Reps 2  3  -GR      Time 2  20 seconds  -GR         Exercise 3    Exercise Name 3  Reverse shoulder rolls  -GR       Cueing 3  Demo  -GR      Sets 3  1  -GR      Reps 3  15  -GR         Exercise 6    Exercise Name 6  seated thoracic extensino  -GR      Cueing 6  Demo  -GR      Sets 6  1  -GR      Reps 6  10  -GR      Additional Comments  slow and controlled  -GR         Exercise 7    Exercise Name 7  swiss ball up wall for thoracic extension and c/spine extension  -GR      Cueing 7  Demo  -GR      Sets 7  1  -GR      Reps 7  10  -GR        User Key  (r) = Recorded By, (t) = Taken By, (c) = Cosigned By    Initials Name Provider Type    Christian Le, PT Physical Therapist                       PT OP Goals     Row Name 07/08/20 1500          PT Short Term Goals    STG Date to Achieve  12/30/19  -GR     STG 1  Pt will demonstrate understanding and compliance with initial HEP.  -GR     STG 1 Progress  Met  -GR     STG 2  Pt will report centralization of symptoms from L fingers to L elbow or more proximal.  -GR     STG 2 Progress  Met  -GR        Long Term Goals    LTG Date to Achieve  01/15/20  -GR     LTG 1  Pt will demonstrate tolerance to supine position x 15 minutes without inc L shoulder pain to allow him to tolerate cervical traction.  -GR     LTG 1 Progress  Progressing  -GR     LTG 2  Pt will report tolerance to sleeping without waking due to L shoulder pain.  -GR     LTG 2 Progress  Progressing  -GR     LTG 3  Pt will demonstrate improved LUE  strength from 40# to 60# or better to improve his function.  -GR     LTG 3 Progress  Met  -GR       User Key  (r) = Recorded By, (t) = Taken By, (c) = Cosigned By    Initials Name Provider Type    Christian Le, PT Physical Therapist                         Time Calculation:   Start Time: 1545  Stop Time: 1620  Time Calculation (min): 35 min  Total Timed Code Minutes- PT: 15 minute(s)  Therapy Charges for Today     Code Description Service Date Service Provider Modifiers Qty    28466778552 HC PT THER PROC EA 15 MIN 7/8/2020 Christian Durán, PT GP 1    [id number]   PT-TRACTION MECHANICAL 7/8/2020 Christian Durán, PT  1                    Christian Durán, PT  7/8/2020

## 2020-07-13 DIAGNOSIS — M25.519 SHOULDER PAIN, UNSPECIFIED CHRONICITY, UNSPECIFIED LATERALITY: ICD-10-CM

## 2020-07-13 RX ORDER — HYDROCODONE BITARTRATE AND ACETAMINOPHEN 10; 325 MG/1; MG/1
1 TABLET ORAL EVERY 4 HOURS PRN
Qty: 40 TABLET | Refills: 0 | Status: SHIPPED | OUTPATIENT
Start: 2020-07-13 | End: 2020-07-20 | Stop reason: SDUPTHER

## 2020-07-13 NOTE — TELEPHONE ENCOUNTER
Caller: Gallo Leary    Relationship: Self    Best call back number: [phone]    Medication needed:   Requested Prescriptions     Pending Prescriptions Disp Refills   • HYDROcodone-acetaminophen (NORCO)  MG per tablet 40 tablet 0     Sig: Take 1 tablet by mouth Every 4 (Four) Hours As Needed for Moderate Pain  or Severe Pain .       When do you need the refill by: ASAP - PT IS ALREADY OUT      Does the patient have less than a 3 day supply:  [x] Yes  [] No    What is the patient's preferred pharmacy:    [facility] #[id number] [facility] [address] AT SEC OF [facility]([address] & [facility] - [phone]  - [phone]   [phone]

## 2020-07-15 ENCOUNTER — APPOINTMENT (OUTPATIENT)
Dept: PHYSICAL THERAPY | Facility: HOSPITAL | Age: 70
End: 2020-07-15

## 2020-07-17 DIAGNOSIS — M25.519 SHOULDER PAIN, UNSPECIFIED CHRONICITY, UNSPECIFIED LATERALITY: ICD-10-CM

## 2020-07-17 RX ORDER — HYDROCODONE BITARTRATE AND ACETAMINOPHEN 10; 325 MG/1; MG/1
1 TABLET ORAL EVERY 4 HOURS PRN
Qty: 40 TABLET | Refills: 0 | OUTPATIENT
Start: 2020-07-17

## 2020-07-17 NOTE — TELEPHONE ENCOUNTER
Patient request a re-fill for   HYDROcodone-acetaminophen (NORCO)  MG per tablet    [address]    Best call back number [phone]

## 2020-07-20 ENCOUNTER — TELEPHONE (OUTPATIENT)
Dept: FAMILY MEDICINE CLINIC | Facility: CLINIC | Age: 70
End: 2020-07-20

## 2020-07-20 DIAGNOSIS — M25.519 SHOULDER PAIN, UNSPECIFIED CHRONICITY, UNSPECIFIED LATERALITY: ICD-10-CM

## 2020-07-20 RX ORDER — HYDROCODONE BITARTRATE AND ACETAMINOPHEN 10; 325 MG/1; MG/1
1 TABLET ORAL EVERY 4 HOURS PRN
Qty: 40 TABLET | Refills: 0 | Status: SHIPPED | OUTPATIENT
Start: 2020-07-20 | End: 2020-07-24 | Stop reason: SDUPTHER

## 2020-07-22 ENCOUNTER — TELEPHONE (OUTPATIENT)
Dept: PHYSICAL THERAPY | Facility: HOSPITAL | Age: 70
End: 2020-07-22

## 2020-07-24 ENCOUNTER — TELEPHONE (OUTPATIENT)
Dept: FAMILY MEDICINE CLINIC | Facility: CLINIC | Age: 70
End: 2020-07-24

## 2020-07-24 DIAGNOSIS — M25.519 SHOULDER PAIN, UNSPECIFIED CHRONICITY, UNSPECIFIED LATERALITY: ICD-10-CM

## 2020-07-24 RX ORDER — HYDROCODONE BITARTRATE AND ACETAMINOPHEN 10; 325 MG/1; MG/1
1 TABLET ORAL EVERY 4 HOURS PRN
Qty: 40 TABLET | Refills: 0 | Status: SHIPPED | OUTPATIENT
Start: 2020-07-24 | End: 2020-07-31 | Stop reason: SDUPTHER

## 2020-07-24 RX ORDER — HYDROCODONE BITARTRATE AND ACETAMINOPHEN 10; 325 MG/1; MG/1
1 TABLET ORAL EVERY 4 HOURS PRN
Qty: 40 TABLET | Refills: 0 | OUTPATIENT
Start: 2020-07-24

## 2020-07-24 NOTE — TELEPHONE ENCOUNTER
Caller: Gallo Leary    Relationship: Self    Best call back number: [phone]    Medication needed:   Requested Prescriptions     Pending Prescriptions Disp Refills   • HYDROcodone-acetaminophen (NORCO)  MG per tablet 40 tablet 0     Sig: Take 1 tablet by mouth Every 4 (Four) Hours As Needed for Moderate Pain  or Severe Pain .       When do you need the refill by: 7/24/20    What details did the patient provide when requesting the medication: n/a    Does the patient have less than a 3 day supply:  [x] Yes  [] No    What is the patient's preferred pharmacy:      [facility] #[id number] [facility] [address] AT SEC OF [facility]([address] & [facility] [phone] [facility] [phone] FX

## 2020-07-24 NOTE — TELEPHONE ENCOUNTER
Patient is calling to request a call from Dr. Dominguez.  He states he just wants to chat with him for a minute.  Would not give any more information.    Please advise.    Patient call back [phone]

## 2020-07-31 DIAGNOSIS — M25.519 SHOULDER PAIN, UNSPECIFIED CHRONICITY, UNSPECIFIED LATERALITY: ICD-10-CM

## 2020-07-31 RX ORDER — HYDROCODONE BITARTRATE AND ACETAMINOPHEN 10; 325 MG/1; MG/1
1 TABLET ORAL EVERY 4 HOURS PRN
Qty: 40 TABLET | Refills: 0 | Status: SHIPPED | OUTPATIENT
Start: 2020-07-31 | End: 2020-08-07 | Stop reason: SDUPTHER

## 2020-08-07 DIAGNOSIS — M25.519 SHOULDER PAIN, UNSPECIFIED CHRONICITY, UNSPECIFIED LATERALITY: ICD-10-CM

## 2020-08-07 RX ORDER — HYDROCODONE BITARTRATE AND ACETAMINOPHEN 10; 325 MG/1; MG/1
1 TABLET ORAL EVERY 4 HOURS PRN
Qty: 40 TABLET | Refills: 0 | Status: SHIPPED | OUTPATIENT
Start: 2020-08-07 | End: 2020-08-14 | Stop reason: SDUPTHER

## 2020-08-07 NOTE — TELEPHONE ENCOUNTER
LAST SEEN: 5/8/20 pt is due for a visit    LAST FILL: 7/20/20    LAST ZION: 7/20/20    OK TO FILL

## 2020-08-07 NOTE — TELEPHONE ENCOUNTER
Patient called in to request a medication refill be called in to the pharmacy.    Best Callback: [phone]    Medicine: HYDROcodone-acetaminophen (NORCO)  MG per tablet    Pharmacy: [facility] #[id number] - [place] [address] AT [facility]([address] - [phone]  - [phone] FX  [phone]    Patient Notes: n/a

## 2020-08-14 DIAGNOSIS — M25.519 SHOULDER PAIN, UNSPECIFIED CHRONICITY, UNSPECIFIED LATERALITY: ICD-10-CM

## 2020-08-14 RX ORDER — HYDROCODONE BITARTRATE AND ACETAMINOPHEN 10; 325 MG/1; MG/1
1 TABLET ORAL EVERY 4 HOURS PRN
Qty: 40 TABLET | Refills: 0 | Status: SHIPPED | OUTPATIENT
Start: 2020-08-14 | End: 2020-08-21 | Stop reason: SDUPTHER

## 2020-08-14 NOTE — TELEPHONE ENCOUNTER
LAST SEEN: 5/8 pt is over due for an appt and does not have one scheduled    LAST FILL: 8/7    LAST ZION: 7/20    OK TO FILL

## 2020-08-14 NOTE — TELEPHONE ENCOUNTER
Caller: Gallo Leary    Relationship: Self    Best call back number: [phone]    Medication needed:   Requested Prescriptions     Pending Prescriptions Disp Refills   • HYDROcodone-acetaminophen (NORCO)  MG per tablet 40 tablet 0     Sig: Take 1 tablet by mouth Every 4 (Four) Hours As Needed for Moderate Pain  or Severe Pain .       When do you need the refill by: ASAP    What details did the patient provide when requesting the medication: PT WILL BE OUT THIS EVENING     Does the patient have less than a 3 day supply:  [x] Yes  [] No    What is the patient's preferred pharmacy: [facility] #[id number] [facility] [address] AT SEC OF [facility]([address] & [facility] [phone] [facility] [phone] FX

## 2020-08-19 ENCOUNTER — HOSPITAL ENCOUNTER (OUTPATIENT)
Dept: PHYSICAL THERAPY | Facility: HOSPITAL | Age: 70
Setting detail: THERAPIES SERIES
Discharge: HOME | End: 2020-08-19

## 2020-08-19 DIAGNOSIS — M25.512 ACUTE PAIN OF LEFT SHOULDER: ICD-10-CM

## 2020-08-19 DIAGNOSIS — M54.2 NECK PAIN: Primary | ICD-10-CM

## 2020-08-19 PROCEDURE — 97012 MECHANICAL TRACTION THERAPY: CPT | Performed by: PHYSICAL THERAPIST

## 2020-08-20 NOTE — THERAPY TREATMENT NOTE
Outpatient Physical Therapy Ortho Treatment Note  [facility]     Patient Name: Gallo Leary  : 1950  MRN: [id number]  Today's Date: 2020      Visit Date: 2020    Visit Dx:    ICD-10-CM ICD-9-CM   1. Neck pain M54.2 723.1   2. Acute pain of left shoulder M25.512 719.41       Patient Active Problem List   Diagnosis   • Cigarette smoker   • Acute low back pain   • Cerumen impaction        No past medical history on file.     No past surgical history on file.                    PT Assessment/Plan     Row Name 20 0820          PT Assessment    Assessment Comments  Patient pain remains high and was only interesetd in traction this visit.  He will return pending his availability.  -GR        PT Plan    PT Plan Comments  Return pending patient availability and attempt to resume therex.  -GR       User Key  (r) = Recorded By, (t) = Taken By, (c) = Cosigned By    Initials Name Provider Type    Christian Le, PT Physical Therapist          Modalities     Row Name 20 1500             Traction 58313    Traction Type  Cervical  -GR      Rx Minutes  15  -GR      Duration  Intermittent  -GR      Position  Other 90/90  -GR      Weight  20 10  -GR      Hold  45  -GR      Relax  15  -GR        User Key  (r) = Recorded By, (t) = Taken By, (c) = Cosigned By    Initials Name Provider Type    Christian Le, PT Physical Therapist        OP Exercises     Row Name 20 0800             Subjective Comments    Subjective Comments  Having more pain and life is disrupting HEP.  -GR         Subjective Pain    Able to rate subjective pain?  yes  -GR      Pre-Treatment Pain Level  9  -GR         Total Minutes    10055 - PT Therapeutic Exercise Minutes  4  -GR         Exercise 1    Exercise Name 1  UBE  -GR      Time 1  4 min  -GR      Additional Comments  L1  -GR        User Key  (r) = Recorded By, (t) = Taken By, (c) = Cosigned By    Initials Name Provider Type    Christian Le,  PT Physical Therapist                       PT OP Goals     Row Name 08/20/20 0800          PT Short Term Goals    STG Date to Achieve  12/30/19  -GR     STG 1  Pt will demonstrate understanding and compliance with initial HEP.  -GR     STG 1 Progress  Met  -GR     STG 2  Pt will report centralization of symptoms from L fingers to L elbow or more proximal.  -GR     STG 2 Progress  Met  -GR        Long Term Goals    LTG Date to Achieve  01/15/20  -GR     LTG 1  Pt will demonstrate tolerance to supine position x 15 minutes without inc L shoulder pain to allow him to tolerate cervical traction.  -GR     LTG 1 Progress  Progressing  -GR     LTG 2  Pt will report tolerance to sleeping without waking due to L shoulder pain.  -GR     LTG 2 Progress  Progressing  -GR     LTG 3  Pt will demonstrate improved LUE  strength from 40# to 60# or better to improve his function.  -     LTG 3 Progress  Met  -GR       User Key  (r) = Recorded By, (t) = Taken By, (c) = Cosigned By    Initials Name Provider Type    GR Christian Durán, PT Physical Therapist                         Time Calculation:   Start Time: 1555  Stop Time: 1620  Time Calculation (min): 25 min  Total Timed Code Minutes- PT: 4 minute(s)  Therapy Charges for Today     Code Description Service Date Service Provider Modifiers Qty    72218942696 HC PT-TRACTION MECHANICAL 8/19/2020 Christian Durán, PT  1    47877447067 HC PT-TRACTION MECHANICAL 8/19/2020 Chrsitian Durán, PT  1                    Christian Durán, PT  8/20/2020

## 2020-08-21 DIAGNOSIS — M25.519 SHOULDER PAIN, UNSPECIFIED CHRONICITY, UNSPECIFIED LATERALITY: ICD-10-CM

## 2020-08-21 RX ORDER — HYDROCODONE BITARTRATE AND ACETAMINOPHEN 10; 325 MG/1; MG/1
1 TABLET ORAL EVERY 4 HOURS PRN
Qty: 40 TABLET | Refills: 0 | Status: SHIPPED | OUTPATIENT
Start: 2020-08-21 | End: 2020-08-28 | Stop reason: SDUPTHER

## 2020-08-21 NOTE — TELEPHONE ENCOUNTER
Caller: Gallo Leary    Relationship: Self    Best call back number: [phone]  Medication needed:   Requested Prescriptions     Pending Prescriptions Disp Refills   • HYDROcodone-acetaminophen (NORCO)  MG per tablet 40 tablet 0     Sig: Take 1 tablet by mouth Every 4 (Four) Hours As Needed for Moderate Pain  or Severe Pain .       When do you need the refill by: 8/21/20    What details did the patient provide when requesting the medication: n/a    Does the patient have less than a 3 day supply:  [x] Yes  [] No    What is the patient's preferred pharmacy:    [facility] DR[facility] STORE #[id number] [facility] [address] AT SEC OF [facility] & [facility] - [phone] [facility] [phone]   [phone]

## 2020-08-28 DIAGNOSIS — M25.519 SHOULDER PAIN, UNSPECIFIED CHRONICITY, UNSPECIFIED LATERALITY: ICD-10-CM

## 2020-08-28 RX ORDER — HYDROCODONE BITARTRATE AND ACETAMINOPHEN 10; 325 MG/1; MG/1
1 TABLET ORAL EVERY 4 HOURS PRN
Qty: 40 TABLET | Refills: 0 | Status: SHIPPED | OUTPATIENT
Start: 2020-08-28 | End: 2020-09-08 | Stop reason: SDUPTHER

## 2020-08-28 NOTE — TELEPHONE ENCOUNTER
Caller: Gallo Leary    Relationship: Self    Best call back number: [phone]    Medication needed:   Requested Prescriptions     Pending Prescriptions Disp Refills   • HYDROcodone-acetaminophen (NORCO)  MG per tablet 40 tablet 0     Sig: Take 1 tablet by mouth Every 4 (Four) Hours As Needed for Moderate Pain  or Severe Pain .       When do you need the refill by: 8/28/20    What details did the patient provide when requesting the medication: n/a    Does the patient have less than a 3 day supply:  [x] Yes  [] No    What is the patient's preferred pharmacy:    [facility] DR[facility] STORE #[id number] [place] [address] AT SEC OF [facility]([address] & [facility] - [phone] [facility] [phone]   [phone]

## 2020-09-01 ENCOUNTER — OFFICE VISIT (OUTPATIENT)
Dept: FAMILY MEDICINE CLINIC | Facility: CLINIC | Age: 70
End: 2020-09-01

## 2020-09-01 ENCOUNTER — RESULTS ENCOUNTER (OUTPATIENT)
Dept: FAMILY MEDICINE CLINIC | Facility: CLINIC | Age: 70
End: 2020-09-01

## 2020-09-01 VITALS
HEART RATE: 74 BPM | DIASTOLIC BLOOD PRESSURE: 76 MMHG | SYSTOLIC BLOOD PRESSURE: 118 MMHG | BODY MASS INDEX: 30.56 KG/M2 | OXYGEN SATURATION: 95 % | WEIGHT: 225.6 LBS | HEIGHT: 72 IN | TEMPERATURE: 99 F

## 2020-09-01 DIAGNOSIS — Z13.6 SCREENING FOR AAA (ABDOMINAL AORTIC ANEURYSM): ICD-10-CM

## 2020-09-01 DIAGNOSIS — Z12.12 SCREENING FOR COLORECTAL CANCER: Primary | ICD-10-CM

## 2020-09-01 DIAGNOSIS — Z12.11 SCREENING FOR COLORECTAL CANCER: Primary | ICD-10-CM

## 2020-09-01 DIAGNOSIS — Z12.12 SCREENING FOR COLORECTAL CANCER: ICD-10-CM

## 2020-09-01 DIAGNOSIS — F17.210 CIGARETTE SMOKER: ICD-10-CM

## 2020-09-01 DIAGNOSIS — M25.512 CHRONIC LEFT SHOULDER PAIN: ICD-10-CM

## 2020-09-01 DIAGNOSIS — Z12.11 SCREENING FOR COLORECTAL CANCER: ICD-10-CM

## 2020-09-01 DIAGNOSIS — Z79.899 HIGH RISK MEDICATION USE: ICD-10-CM

## 2020-09-01 DIAGNOSIS — G89.29 CHRONIC LEFT SHOULDER PAIN: ICD-10-CM

## 2020-09-01 PROBLEM — M54.50 ACUTE LOW BACK PAIN: Status: RESOLVED | Noted: 2019-09-17 | Resolved: 2020-09-01

## 2020-09-01 PROBLEM — H61.20 CERUMEN IMPACTION: Status: RESOLVED | Noted: 2019-09-17 | Resolved: 2020-09-01

## 2020-09-01 PROCEDURE — 99213 OFFICE O/P EST LOW 20 MIN: CPT | Performed by: FAMILY MEDICINE

## 2020-09-01 NOTE — PROGRESS NOTES
KIKI Leary is a 70 y.o. male who is here for follow up especially of chronic left shoulder pain.  Previously seen by orthopedist and currently undergoing physical therapy.  Recently got some type of traction device that did seem to help and is trying to get home device.  Unfortunately continues to smoke and this was discussed.  Also discussed considering follow-up visit with orthopedist?  Patient is not interested in any surgical intervention.  He continues to work as I recall as a .        Review of Systems   Musculoskeletal: Positive for arthralgias and back pain.   All other systems reviewed and are negative.        No past medical history on file.    No past surgical history on file.    No family history on file.    Social History     Socioeconomic History   • Marital status:      Spouse name: Not on file   • Number of children: Not on file   • Years of education: Not on file   • Highest education level: Not on file   Tobacco Use   • Smoking status: Current Every Day Smoker     Packs/day: 0.50     Years: 50.00     Pack years: 25.00   • Smokeless tobacco: Never Used   Substance and Sexual Activity   • Alcohol use: Yes     Comment: very seldom 1 or twice a year.   • Drug use: No   • Sexual activity: Not Currently       Vitals:    09/01/20 1600   BP: 118/76   Pulse: 74   Temp: 99 °F (37.2 °C)   SpO2: 95%        Body mass index is 30.59 kg/m².      Physical Exam   Constitutional: He is oriented to person, place, and time. He appears well-developed and well-nourished. No distress.   HENT:   Head: Normocephalic and atraumatic.   Eyes: Pupils are equal, round, and reactive to light. Conjunctivae and EOM are normal.   Neck: Normal range of motion. No thyromegaly present.   Cardiovascular: Normal rate, regular rhythm and normal heart sounds.   Pulmonary/Chest: Effort normal and breath sounds normal. No respiratory distress.   Abdominal: Soft. He exhibits no distension and no mass. There  is no tenderness. No hernia.   Musculoskeletal: Normal range of motion. He exhibits no edema, tenderness or deformity.   Lymphadenopathy:     He has no cervical adenopathy.   Neurological: He is alert and oriented to person, place, and time. He exhibits normal muscle tone. Coordination normal.   Skin: Skin is warm and dry. No rash noted. No pallor.   Psychiatric: He has a normal mood and affect. His behavior is normal. Judgment and thought content normal.   Nursing note and vitals reviewed.        Assessment/Plan    Gallo was seen today for follow-up.    Diagnoses and all orders for this visit:    Screening for colorectal cancer  -     Cologuard - Stool, Per Rectum; Future    Screening for AAA (abdominal aortic aneurysm)  -      aaa screen limited; Future    Chronic left shoulder pain    Cigarette smoker    High risk medication use        Patient here for follow-up of persistent left shoulder pain.  Currently he is undergoing physical therapy.  Other options discussed as above.  Is requiring current pain medication which will be monitored closely.  Remains functional and continues to work.  No evidence of abuse nor misuse of medication and will continue to monitor closely every 3 months.    Again strongly recommend discontinuing cigarette use.    This note includes information entered using a voice recognition dictation system.  Though reviewed, some nonsensible errors may remain.

## 2020-09-05 ENCOUNTER — DOCUMENTATION (OUTPATIENT)
Dept: FAMILY MEDICINE CLINIC | Facility: CLINIC | Age: 70
End: 2020-09-05

## 2020-09-08 DIAGNOSIS — M25.519 SHOULDER PAIN, UNSPECIFIED CHRONICITY, UNSPECIFIED LATERALITY: ICD-10-CM

## 2020-09-08 RX ORDER — HYDROCODONE BITARTRATE AND ACETAMINOPHEN 10; 325 MG/1; MG/1
1 TABLET ORAL EVERY 4 HOURS PRN
Qty: 40 TABLET | Refills: 0 | Status: SHIPPED | OUTPATIENT
Start: 2020-09-08 | End: 2020-09-14 | Stop reason: SDUPTHER

## 2020-09-14 ENCOUNTER — TELEPHONE (OUTPATIENT)
Dept: FAMILY MEDICINE CLINIC | Facility: CLINIC | Age: 70
End: 2020-09-14

## 2020-09-14 DIAGNOSIS — M25.519 SHOULDER PAIN, UNSPECIFIED CHRONICITY, UNSPECIFIED LATERALITY: ICD-10-CM

## 2020-09-14 RX ORDER — HYDROCODONE BITARTRATE AND ACETAMINOPHEN 10; 325 MG/1; MG/1
1 TABLET ORAL EVERY 4 HOURS PRN
Qty: 40 TABLET | Refills: 0 | Status: SHIPPED | OUTPATIENT
Start: 2020-09-14 | End: 2020-09-18 | Stop reason: SDUPTHER

## 2020-09-14 RX ORDER — HYDROCODONE BITARTRATE AND ACETAMINOPHEN 10; 325 MG/1; MG/1
1 TABLET ORAL EVERY 4 HOURS PRN
Qty: 40 TABLET | Refills: 0 | Status: CANCELLED | OUTPATIENT
Start: 2020-09-14

## 2020-09-14 NOTE — TELEPHONE ENCOUNTER
Looks like this request has already been sent to you and he has already called in to get a refill.

## 2020-09-18 DIAGNOSIS — M25.519 SHOULDER PAIN, UNSPECIFIED CHRONICITY, UNSPECIFIED LATERALITY: ICD-10-CM

## 2020-09-21 RX ORDER — HYDROCODONE BITARTRATE AND ACETAMINOPHEN 10; 325 MG/1; MG/1
1 TABLET ORAL EVERY 4 HOURS PRN
Qty: 40 TABLET | Refills: 0 | Status: SHIPPED | OUTPATIENT
Start: 2020-09-21 | End: 2020-09-25 | Stop reason: SDUPTHER

## 2020-09-25 DIAGNOSIS — M25.519 SHOULDER PAIN, UNSPECIFIED CHRONICITY, UNSPECIFIED LATERALITY: ICD-10-CM

## 2020-09-28 RX ORDER — HYDROCODONE BITARTRATE AND ACETAMINOPHEN 10; 325 MG/1; MG/1
1 TABLET ORAL EVERY 4 HOURS PRN
Qty: 40 TABLET | Refills: 0 | Status: SHIPPED | OUTPATIENT
Start: 2020-09-28 | End: 2020-10-05 | Stop reason: SDUPTHER

## 2020-10-02 DIAGNOSIS — M25.519 SHOULDER PAIN, UNSPECIFIED CHRONICITY, UNSPECIFIED LATERALITY: ICD-10-CM

## 2020-10-02 RX ORDER — HYDROCODONE BITARTRATE AND ACETAMINOPHEN 10; 325 MG/1; MG/1
1 TABLET ORAL EVERY 4 HOURS PRN
Qty: 40 TABLET | Refills: 0 | Status: CANCELLED | OUTPATIENT
Start: 2020-10-02

## 2020-10-05 ENCOUNTER — TELEPHONE (OUTPATIENT)
Dept: FAMILY MEDICINE CLINIC | Facility: CLINIC | Age: 70
End: 2020-10-05

## 2020-10-05 DIAGNOSIS — M25.519 SHOULDER PAIN, UNSPECIFIED CHRONICITY, UNSPECIFIED LATERALITY: ICD-10-CM

## 2020-10-05 RX ORDER — HYDROCODONE BITARTRATE AND ACETAMINOPHEN 10; 325 MG/1; MG/1
1 TABLET ORAL EVERY 4 HOURS PRN
Qty: 50 TABLET | Refills: 0 | Status: SHIPPED | OUTPATIENT
Start: 2020-10-05 | End: 2020-10-08 | Stop reason: SDUPTHER

## 2020-10-05 NOTE — TELEPHONE ENCOUNTER
Pt needs a refill on:  *HYDROCODONE 10/325#40  PHARM#[phone]    THIS HAS MADE THE 5TH 0R 6TH TIME THAT PT HAS WENT WITHOUT HIS MEDICATION, HE HAS CALLED FOR REFILLS AND ALSO TRIED TO REQUEST ON MYCHART AND HAS FAILED ALL ATTEMPTS.  PT'S#[phone]    HE REQUESTED MED ON Friday FOR IT TO BE REFILLED ON Sunday, HE ONLY HAS ONE MEDICATION HE TAKES.

## 2020-10-08 ENCOUNTER — TELEPHONE (OUTPATIENT)
Dept: FAMILY MEDICINE CLINIC | Facility: CLINIC | Age: 70
End: 2020-10-08

## 2020-10-08 DIAGNOSIS — M25.519 SHOULDER PAIN, UNSPECIFIED CHRONICITY, UNSPECIFIED LATERALITY: ICD-10-CM

## 2020-10-08 RX ORDER — HYDROCODONE BITARTRATE AND ACETAMINOPHEN 10; 325 MG/1; MG/1
1 TABLET ORAL EVERY 4 HOURS PRN
Qty: 50 TABLET | Refills: 0 | Status: SHIPPED | OUTPATIENT
Start: 2020-10-08 | End: 2020-10-15 | Stop reason: SDUPTHER

## 2020-10-15 ENCOUNTER — TELEPHONE (OUTPATIENT)
Dept: FAMILY MEDICINE CLINIC | Facility: CLINIC | Age: 70
End: 2020-10-15

## 2020-10-15 DIAGNOSIS — M25.519 SHOULDER PAIN, UNSPECIFIED CHRONICITY, UNSPECIFIED LATERALITY: ICD-10-CM

## 2020-10-15 RX ORDER — HYDROCODONE BITARTRATE AND ACETAMINOPHEN 10; 325 MG/1; MG/1
1 TABLET ORAL EVERY 4 HOURS PRN
Qty: 50 TABLET | Refills: 0 | Status: SHIPPED | OUTPATIENT
Start: 2020-10-15 | End: 2020-10-22 | Stop reason: SDUPTHER

## 2020-10-22 DIAGNOSIS — M25.519 SHOULDER PAIN, UNSPECIFIED CHRONICITY, UNSPECIFIED LATERALITY: ICD-10-CM

## 2020-10-22 RX ORDER — HYDROCODONE BITARTRATE AND ACETAMINOPHEN 10; 325 MG/1; MG/1
1 TABLET ORAL EVERY 4 HOURS PRN
Qty: 50 TABLET | Refills: 0 | Status: SHIPPED | OUTPATIENT
Start: 2020-10-22 | End: 2020-10-30 | Stop reason: SDUPTHER

## 2020-10-30 DIAGNOSIS — M25.519 SHOULDER PAIN, UNSPECIFIED CHRONICITY, UNSPECIFIED LATERALITY: ICD-10-CM

## 2020-10-30 RX ORDER — HYDROCODONE BITARTRATE AND ACETAMINOPHEN 10; 325 MG/1; MG/1
1 TABLET ORAL EVERY 4 HOURS PRN
Qty: 42 TABLET | Refills: 0 | Status: SHIPPED | OUTPATIENT
Start: 2020-10-30 | End: 2020-11-05 | Stop reason: SDUPTHER

## 2020-11-05 ENCOUNTER — TELEPHONE (OUTPATIENT)
Dept: FAMILY MEDICINE CLINIC | Facility: CLINIC | Age: 70
End: 2020-11-05

## 2020-11-05 DIAGNOSIS — M25.519 SHOULDER PAIN, UNSPECIFIED CHRONICITY, UNSPECIFIED LATERALITY: ICD-10-CM

## 2020-11-05 RX ORDER — HYDROCODONE BITARTRATE AND ACETAMINOPHEN 10; 325 MG/1; MG/1
1 TABLET ORAL EVERY 4 HOURS PRN
Qty: 42 TABLET | Refills: 0 | Status: SHIPPED | OUTPATIENT
Start: 2020-11-05 | End: 2020-11-12 | Stop reason: SDUPTHER

## 2020-11-05 NOTE — TELEPHONE ENCOUNTER
PT CALLED NEEDING REFILL ON: HYDROcodone-acetaminophen (NORCO)  MG per tablet [54416]    PT SAYS IT CAN BE FILLED AT PHARMACY:   [facility] DR[facility]ORE #[id number] - [place], [place] - [address] AT [facility] - [phone]  - [phone] FX   Phone:  [phone]   Fax:  [phone]   Address:  [address], [facility] [address]   Pharmacy Comments: --        PT ASKS IF THE PHARMACY CAN HAVE IT READY ON Sunday, NORMALLY HE TAKES HIS LAST ONE ON Sunday NIGHT AND DOESN'T HAVE ANY ON Monday.

## 2020-11-10 ENCOUNTER — TELEPHONE (OUTPATIENT)
Dept: FAMILY MEDICINE CLINIC | Facility: CLINIC | Age: 70
End: 2020-11-10

## 2020-11-12 ENCOUNTER — TELEPHONE (OUTPATIENT)
Dept: FAMILY MEDICINE CLINIC | Facility: CLINIC | Age: 70
End: 2020-11-12

## 2020-11-12 DIAGNOSIS — M25.519 SHOULDER PAIN, UNSPECIFIED CHRONICITY, UNSPECIFIED LATERALITY: ICD-10-CM

## 2020-11-12 RX ORDER — HYDROCODONE BITARTRATE AND ACETAMINOPHEN 10; 325 MG/1; MG/1
1 TABLET ORAL EVERY 4 HOURS PRN
Qty: 42 TABLET | Refills: 0 | Status: SHIPPED | OUTPATIENT
Start: 2020-11-12 | End: 2020-11-19 | Stop reason: SDUPTHER

## 2020-11-19 DIAGNOSIS — M25.519 SHOULDER PAIN, UNSPECIFIED CHRONICITY, UNSPECIFIED LATERALITY: ICD-10-CM

## 2020-11-19 RX ORDER — HYDROCODONE BITARTRATE AND ACETAMINOPHEN 10; 325 MG/1; MG/1
1 TABLET ORAL EVERY 4 HOURS PRN
Qty: 42 TABLET | Refills: 0 | Status: SHIPPED | OUTPATIENT
Start: 2020-11-19 | End: 2020-12-01 | Stop reason: SDUPTHER

## 2020-11-19 RX ORDER — HYDROCODONE BITARTRATE AND ACETAMINOPHEN 10; 325 MG/1; MG/1
1 TABLET ORAL EVERY 4 HOURS PRN
Qty: 42 TABLET | Refills: 0 | Status: SHIPPED | OUTPATIENT
Start: 2020-11-19 | End: 2020-11-25 | Stop reason: SDUPTHER

## 2020-11-25 DIAGNOSIS — M25.519 SHOULDER PAIN, UNSPECIFIED CHRONICITY, UNSPECIFIED LATERALITY: ICD-10-CM

## 2020-11-25 RX ORDER — HYDROCODONE BITARTRATE AND ACETAMINOPHEN 10; 325 MG/1; MG/1
1 TABLET ORAL EVERY 4 HOURS PRN
Qty: 42 TABLET | Refills: 0 | Status: SHIPPED | OUTPATIENT
Start: 2020-11-25 | End: 2020-12-03 | Stop reason: SDUPTHER

## 2020-12-01 ENCOUNTER — OFFICE VISIT (OUTPATIENT)
Dept: FAMILY MEDICINE CLINIC | Facility: CLINIC | Age: 70
End: 2020-12-01

## 2020-12-01 VITALS
TEMPERATURE: 97.5 F | HEART RATE: 65 BPM | OXYGEN SATURATION: 97 % | DIASTOLIC BLOOD PRESSURE: 82 MMHG | BODY MASS INDEX: 30.34 KG/M2 | WEIGHT: 224 LBS | SYSTOLIC BLOOD PRESSURE: 128 MMHG | HEIGHT: 72 IN

## 2020-12-01 DIAGNOSIS — G89.29 CHRONIC LEFT SHOULDER PAIN: Primary | ICD-10-CM

## 2020-12-01 DIAGNOSIS — F17.210 CIGARETTE SMOKER: ICD-10-CM

## 2020-12-01 DIAGNOSIS — Z79.899 HIGH RISK MEDICATION USE: ICD-10-CM

## 2020-12-01 DIAGNOSIS — M25.512 CHRONIC LEFT SHOULDER PAIN: Primary | ICD-10-CM

## 2020-12-01 PROCEDURE — 99213 OFFICE O/P EST LOW 20 MIN: CPT | Performed by: FAMILY MEDICINE

## 2020-12-01 NOTE — PROGRESS NOTES
KIKI Leary is a 70 y.o. male who is here for follow up of chronic left shoulder pain.  Patient was seen by orthopedist who had recommended shoulder surgery but patient declined.  Main complaint is severe fatigue he relates to having to work long hours.  Unfortunately continues to smoke which is strongly discouraged.      Review of Systems   Musculoskeletal:        Persistent left shoulder pain   All other systems reviewed and are negative.        History reviewed. No pertinent past medical history.    History reviewed. No pertinent surgical history.    History reviewed. No pertinent family history.    Social History     Socioeconomic History   • Marital status:      Spouse name: Not on file   • Number of children: Not on file   • Years of education: Not on file   • Highest education level: Not on file   Tobacco Use   • Smoking status: Current Every Day Smoker     Packs/day: 0.50     Years: 50.00     Pack years: 25.00   • Smokeless tobacco: Never Used   Substance and Sexual Activity   • Alcohol use: Yes     Comment: very seldom 1 or twice a year.   • Drug use: No   • Sexual activity: Not Currently       Vitals:    12/01/20 1607   BP: 128/82   Pulse: 65   Temp: 97.5 °F (36.4 °C)   SpO2: 97%        Body mass index is 30.37 kg/m².      Physical Exam  Vitals signs and nursing note reviewed.   Constitutional:       General: He is not in acute distress.     Appearance: Normal appearance. He is well-developed. He is not ill-appearing.   HENT:      Head: Normocephalic and atraumatic.   Eyes:      Conjunctiva/sclera: Conjunctivae normal.      Pupils: Pupils are equal, round, and reactive to light.   Neck:      Musculoskeletal: Normal range of motion.      Thyroid: No thyromegaly.   Cardiovascular:      Rate and Rhythm: Normal rate and regular rhythm.   Pulmonary:      Effort: Pulmonary effort is normal. No respiratory distress.      Breath sounds: Normal breath sounds.   Abdominal:      General: There is  no distension.      Palpations: Abdomen is soft. There is no mass.      Tenderness: There is no abdominal tenderness.      Hernia: No hernia is present.   Musculoskeletal: Normal range of motion.         General: No tenderness or deformity.   Lymphadenopathy:      Cervical: No cervical adenopathy.   Skin:     General: Skin is warm and dry.      Coloration: Skin is not pale.      Findings: No rash.   Neurological:      General: No focal deficit present.      Mental Status: He is alert and oriented to person, place, and time. Mental status is at baseline.      Motor: No abnormal muscle tone.      Coordination: Coordination normal.   Psychiatric:         Mood and Affect: Mood normal.         Behavior: Behavior normal.         Thought Content: Thought content normal.         Judgment: Judgment normal.           Assessment/Plan    Diagnoses and all orders for this visit:    1. Chronic left shoulder pain (Primary)    2. Cigarette smoker    3. High risk medication use      Patient for follow-up of persistent left shoulder pain currently treated with pain medication only.  Continues to work and under adequate control.  Again recommended routine health maintenance including lab tests and imaging.  To this point has been declined.  Apparently Cologuard and aortic aneurysm screening has been ordered in the past but not completed.  For now we will continue current regimen including follow-up every 3 months.  Continue to encourage discontinuing cigarette use and other health maintenance screening?  There is a significant family history of cancers?  Again need to continue to encourage immunization updates.    This note includes information entered using a voice recognition dictation system.  Though reviewed, some nonsensible errors may remain.

## 2020-12-03 ENCOUNTER — TELEPHONE (OUTPATIENT)
Dept: FAMILY MEDICINE CLINIC | Facility: CLINIC | Age: 70
End: 2020-12-03

## 2020-12-03 DIAGNOSIS — M25.519 SHOULDER PAIN, UNSPECIFIED CHRONICITY, UNSPECIFIED LATERALITY: ICD-10-CM

## 2020-12-03 RX ORDER — HYDROCODONE BITARTRATE AND ACETAMINOPHEN 10; 325 MG/1; MG/1
1 TABLET ORAL EVERY 4 HOURS PRN
Qty: 42 TABLET | Refills: 0 | Status: SHIPPED | OUTPATIENT
Start: 2020-12-03 | End: 2020-12-10 | Stop reason: SDUPTHER

## 2020-12-10 ENCOUNTER — TELEPHONE (OUTPATIENT)
Dept: FAMILY MEDICINE CLINIC | Facility: CLINIC | Age: 70
End: 2020-12-10

## 2020-12-10 DIAGNOSIS — M25.519 SHOULDER PAIN, UNSPECIFIED CHRONICITY, UNSPECIFIED LATERALITY: ICD-10-CM

## 2020-12-10 RX ORDER — HYDROCODONE BITARTRATE AND ACETAMINOPHEN 10; 325 MG/1; MG/1
1 TABLET ORAL EVERY 4 HOURS PRN
Qty: 42 TABLET | Refills: 0 | Status: SHIPPED | OUTPATIENT
Start: 2020-12-10 | End: 2020-12-18 | Stop reason: SDUPTHER

## 2020-12-18 ENCOUNTER — TELEPHONE (OUTPATIENT)
Dept: FAMILY MEDICINE CLINIC | Facility: CLINIC | Age: 70
End: 2020-12-18

## 2020-12-18 DIAGNOSIS — M25.519 SHOULDER PAIN, UNSPECIFIED CHRONICITY, UNSPECIFIED LATERALITY: ICD-10-CM

## 2020-12-18 RX ORDER — HYDROCODONE BITARTRATE AND ACETAMINOPHEN 10; 325 MG/1; MG/1
1 TABLET ORAL EVERY 4 HOURS PRN
Qty: 42 TABLET | Refills: 0 | Status: SHIPPED | OUTPATIENT
Start: 2020-12-18 | End: 2020-12-31 | Stop reason: SDUPTHER

## 2020-12-22 DIAGNOSIS — M25.519 SHOULDER PAIN, UNSPECIFIED CHRONICITY, UNSPECIFIED LATERALITY: ICD-10-CM

## 2020-12-22 RX ORDER — HYDROCODONE BITARTRATE AND ACETAMINOPHEN 10; 325 MG/1; MG/1
1 TABLET ORAL EVERY 4 HOURS PRN
Qty: 42 TABLET | Refills: 0 | OUTPATIENT
Start: 2020-12-22

## 2020-12-24 ENCOUNTER — TELEPHONE (OUTPATIENT)
Dept: FAMILY MEDICINE CLINIC | Facility: CLINIC | Age: 70
End: 2020-12-24

## 2020-12-31 ENCOUNTER — TELEPHONE (OUTPATIENT)
Dept: FAMILY MEDICINE CLINIC | Facility: CLINIC | Age: 70
End: 2020-12-31

## 2020-12-31 DIAGNOSIS — M25.519 SHOULDER PAIN, UNSPECIFIED CHRONICITY, UNSPECIFIED LATERALITY: ICD-10-CM

## 2020-12-31 RX ORDER — HYDROCODONE BITARTRATE AND ACETAMINOPHEN 10; 325 MG/1; MG/1
1 TABLET ORAL EVERY 4 HOURS PRN
Qty: 42 TABLET | Refills: 0 | Status: SHIPPED | OUTPATIENT
Start: 2020-12-31 | End: 2021-01-07 | Stop reason: SDUPTHER

## 2021-01-07 ENCOUNTER — TELEPHONE (OUTPATIENT)
Dept: FAMILY MEDICINE CLINIC | Facility: CLINIC | Age: 71
End: 2021-01-07

## 2021-01-07 DIAGNOSIS — M25.519 SHOULDER PAIN, UNSPECIFIED CHRONICITY, UNSPECIFIED LATERALITY: ICD-10-CM

## 2021-01-07 RX ORDER — HYDROCODONE BITARTRATE AND ACETAMINOPHEN 10; 325 MG/1; MG/1
1 TABLET ORAL EVERY 4 HOURS PRN
Qty: 42 TABLET | Refills: 0 | Status: SHIPPED | OUTPATIENT
Start: 2021-01-07 | End: 2021-01-14 | Stop reason: SDUPTHER

## 2021-01-07 NOTE — TELEPHONE ENCOUNTER
"PT CALLED IN FOR REFILL OF HYDROcodone-acetaminophen (NORCO)  MG per tablet [45097]     Pharmacy:   [facility]ORE #[id number] - [facility] [address] AT [facility] OF [facility]([address] & [facility] - [phone] [facility] [phone] FX        PLEASE NOTE IT MUST SAY \"PT CAN  MEDS ON Sunday\" PLEASE     THANK YOU  "

## 2021-01-14 ENCOUNTER — TELEPHONE (OUTPATIENT)
Dept: FAMILY MEDICINE CLINIC | Facility: CLINIC | Age: 71
End: 2021-01-14

## 2021-01-14 DIAGNOSIS — M25.519 SHOULDER PAIN, UNSPECIFIED CHRONICITY, UNSPECIFIED LATERALITY: ICD-10-CM

## 2021-01-14 RX ORDER — HYDROCODONE BITARTRATE AND ACETAMINOPHEN 10; 325 MG/1; MG/1
1 TABLET ORAL EVERY 4 HOURS PRN
Qty: 42 TABLET | Refills: 0 | Status: SHIPPED | OUTPATIENT
Start: 2021-01-14 | End: 2021-01-21 | Stop reason: SDUPTHER

## 2021-01-14 NOTE — TELEPHONE ENCOUNTER
PT NEEDS REFILL ON:  *HYDROCODONE 10/325 #42  PHARM#[phone] [place]  NEEDS TO SAY TO BE PICK-UP ON Sunday  PT'S#[phone]

## 2021-01-21 ENCOUNTER — TELEPHONE (OUTPATIENT)
Dept: FAMILY MEDICINE CLINIC | Facility: CLINIC | Age: 71
End: 2021-01-21

## 2021-01-21 DIAGNOSIS — M25.519 SHOULDER PAIN, UNSPECIFIED CHRONICITY, UNSPECIFIED LATERALITY: ICD-10-CM

## 2021-01-21 RX ORDER — HYDROCODONE BITARTRATE AND ACETAMINOPHEN 10; 325 MG/1; MG/1
1 TABLET ORAL EVERY 4 HOURS PRN
Qty: 42 TABLET | Refills: 0 | Status: SHIPPED | OUTPATIENT
Start: 2021-01-21 | End: 2021-01-28 | Stop reason: SDUPTHER

## 2021-01-21 NOTE — TELEPHONE ENCOUNTER
PT NEEDS A REFILL ON:  *HYDROCODONE 10/325 #42  PHARM#[phone] [place]  MUST SAY MAY REFILL ON Sunday  PT'S#[phone]

## 2021-01-28 ENCOUNTER — TELEPHONE (OUTPATIENT)
Dept: FAMILY MEDICINE CLINIC | Facility: CLINIC | Age: 71
End: 2021-01-28

## 2021-01-28 DIAGNOSIS — M25.519 SHOULDER PAIN, UNSPECIFIED CHRONICITY, UNSPECIFIED LATERALITY: ICD-10-CM

## 2021-01-28 RX ORDER — HYDROCODONE BITARTRATE AND ACETAMINOPHEN 10; 325 MG/1; MG/1
1 TABLET ORAL EVERY 4 HOURS PRN
Qty: 42 TABLET | Refills: 0 | Status: SHIPPED | OUTPATIENT
Start: 2021-01-28 | End: 2021-02-04 | Stop reason: SDUPTHER

## 2021-02-04 ENCOUNTER — TELEPHONE (OUTPATIENT)
Dept: FAMILY MEDICINE CLINIC | Facility: CLINIC | Age: 71
End: 2021-02-04

## 2021-02-04 DIAGNOSIS — M25.519 SHOULDER PAIN, UNSPECIFIED CHRONICITY, UNSPECIFIED LATERALITY: ICD-10-CM

## 2021-02-04 RX ORDER — HYDROCODONE BITARTRATE AND ACETAMINOPHEN 10; 325 MG/1; MG/1
1 TABLET ORAL EVERY 4 HOURS PRN
Qty: 42 TABLET | Refills: 0 | Status: SHIPPED | OUTPATIENT
Start: 2021-02-04 | End: 2021-02-11 | Stop reason: SDUPTHER

## 2021-02-04 NOTE — TELEPHONE ENCOUNTER
PT NEEDS REFILL ON:  *HYDROCODONE 10/325#42  PHARM#[phone] [address]  PT'S#[phone]  MAY PICK-UP ON SUNDAY

## 2021-02-11 ENCOUNTER — TELEPHONE (OUTPATIENT)
Dept: FAMILY MEDICINE CLINIC | Facility: CLINIC | Age: 71
End: 2021-02-11

## 2021-02-11 DIAGNOSIS — M25.519 SHOULDER PAIN, UNSPECIFIED CHRONICITY, UNSPECIFIED LATERALITY: ICD-10-CM

## 2021-02-11 RX ORDER — HYDROCODONE BITARTRATE AND ACETAMINOPHEN 10; 325 MG/1; MG/1
1 TABLET ORAL EVERY 4 HOURS PRN
Qty: 42 TABLET | Refills: 0 | Status: SHIPPED | OUTPATIENT
Start: 2021-02-11 | End: 2021-02-18 | Stop reason: SDUPTHER

## 2021-02-11 NOTE — TELEPHONE ENCOUNTER
"PT CALLED IN FOR REFILL OF HYDROcodone-acetaminophen (NORCO)  MG per tablet [80084]     Pharmacy:   [facility] DR[facility] STORE #[id number] - [facility] [address] AT [facility] OF [facility]([address] & [facility] - [phone] [facility] [phone] FX        PLEASE NOTE IT MUST SAY \"PT CAN  MEDS ON Sunday\" PLEASE     THANK YOU  "

## 2021-02-18 ENCOUNTER — TELEPHONE (OUTPATIENT)
Dept: FAMILY MEDICINE CLINIC | Facility: CLINIC | Age: 71
End: 2021-02-18

## 2021-02-18 DIAGNOSIS — M25.519 SHOULDER PAIN, UNSPECIFIED CHRONICITY, UNSPECIFIED LATERALITY: ICD-10-CM

## 2021-02-18 RX ORDER — HYDROCODONE BITARTRATE AND ACETAMINOPHEN 10; 325 MG/1; MG/1
1 TABLET ORAL EVERY 4 HOURS PRN
Qty: 42 TABLET | Refills: 0 | Status: SHIPPED | OUTPATIENT
Start: 2021-02-18 | End: 2021-02-25 | Stop reason: SDUPTHER

## 2021-02-25 DIAGNOSIS — M25.519 SHOULDER PAIN, UNSPECIFIED CHRONICITY, UNSPECIFIED LATERALITY: ICD-10-CM

## 2021-02-25 RX ORDER — HYDROCODONE BITARTRATE AND ACETAMINOPHEN 10; 325 MG/1; MG/1
1 TABLET ORAL EVERY 4 HOURS PRN
Qty: 42 TABLET | Refills: 0 | Status: SHIPPED | OUTPATIENT
Start: 2021-02-25 | End: 2021-03-04 | Stop reason: SDUPTHER

## 2021-03-04 DIAGNOSIS — M25.519 SHOULDER PAIN, UNSPECIFIED CHRONICITY, UNSPECIFIED LATERALITY: ICD-10-CM

## 2021-03-04 RX ORDER — HYDROCODONE BITARTRATE AND ACETAMINOPHEN 10; 325 MG/1; MG/1
1 TABLET ORAL EVERY 4 HOURS PRN
Qty: 42 TABLET | Refills: 0 | Status: SHIPPED | OUTPATIENT
Start: 2021-03-04 | End: 2021-03-11 | Stop reason: SDUPTHER

## 2021-03-04 NOTE — TELEPHONE ENCOUNTER
"PT CALLED IN FOR REFILL OF HYDROcodone-acetaminophen (NORCO)  MG per tablet [42785]     Pharmacy:   [facility] DR[facility] #[id number] - [facility] [address] AT [facility] OF [facility]([address] & [facility] - [phone] [facility] [phone] FX        PLEASE NOTE IT MUST SAY \"PT CAN  MEDS ON Sunday\" PLEASE     THANK YOU  "

## 2021-03-09 ENCOUNTER — OFFICE VISIT (OUTPATIENT)
Dept: FAMILY MEDICINE CLINIC | Facility: CLINIC | Age: 71
End: 2021-03-09

## 2021-03-09 VITALS
WEIGHT: 224 LBS | TEMPERATURE: 97.3 F | HEIGHT: 72 IN | SYSTOLIC BLOOD PRESSURE: 128 MMHG | OXYGEN SATURATION: 98 % | BODY MASS INDEX: 30.34 KG/M2 | RESPIRATION RATE: 18 BRPM | DIASTOLIC BLOOD PRESSURE: 80 MMHG | HEART RATE: 75 BPM

## 2021-03-09 DIAGNOSIS — G89.29 CHRONIC LEFT SHOULDER PAIN: Primary | ICD-10-CM

## 2021-03-09 DIAGNOSIS — M25.512 CHRONIC LEFT SHOULDER PAIN: Primary | ICD-10-CM

## 2021-03-09 DIAGNOSIS — Z79.899 HIGH RISK MEDICATION USE: ICD-10-CM

## 2021-03-09 DIAGNOSIS — F17.210 CIGARETTE SMOKER: ICD-10-CM

## 2021-03-09 PROCEDURE — 99213 OFFICE O/P EST LOW 20 MIN: CPT | Performed by: FAMILY MEDICINE

## 2021-03-09 NOTE — PROGRESS NOTES
HPI  Gallo Leary is a 71 y.o. male who is here for follow up of persistent left shoulder pain.  Reports that as long as takes pain medication remains functional.  Discussed concerns about health maintenance and reports has decreased cigarette use significantly strongly encouraged.  Also discussed recommendations for cancer screening.  Specifically lung cancer screening with smoking history as well as screening for colon cancer.  Currently remains on health insurance from company and not on Medicare.  Routine lab work also discussed including lipid panel as well as PSA and other tests.      Review of Systems   Constitutional: Negative for chills, fatigue and fever.   All other systems reviewed and are negative.        No past medical history on file.    No past surgical history on file.    No family history on file.    Social History     Socioeconomic History   • Marital status:      Spouse name: Not on file   • Number of children: Not on file   • Years of education: Not on file   • Highest education level: Not on file   Tobacco Use   • Smoking status: Current Every Day Smoker     Packs/day: 0.50     Years: 50.00     Pack years: 25.00   • Smokeless tobacco: Never Used   Substance and Sexual Activity   • Alcohol use: Yes     Comment: very seldom 1 or twice a year.   • Drug use: No   • Sexual activity: Not Currently       Vitals:    03/09/21 1602   BP: 128/80   Pulse: 75   Resp: 18   Temp: 97.3 °F (36.3 °C)   SpO2: 98%        Body mass index is 30.37 kg/m².      Physical Exam  Vitals and nursing note reviewed.   Constitutional:       General: He is not in acute distress.     Appearance: He is well-developed.   HENT:      Head: Normocephalic and atraumatic.      Nose:      Comments: Patient with mask.  Provider with mask and shield  Eyes:      Conjunctiva/sclera: Conjunctivae normal.      Pupils: Pupils are equal, round, and reactive to light.   Neck:      Thyroid: No thyromegaly.   Cardiovascular:       Rate and Rhythm: Normal rate and regular rhythm.      Heart sounds: Normal heart sounds.   Pulmonary:      Effort: Pulmonary effort is normal. No respiratory distress.      Breath sounds: Normal breath sounds.   Abdominal:      General: There is no distension.      Palpations: Abdomen is soft. There is no mass.      Tenderness: There is no abdominal tenderness.      Hernia: No hernia is present.   Musculoskeletal:         General: No tenderness or deformity. Normal range of motion.      Cervical back: Normal range of motion.   Lymphadenopathy:      Cervical: No cervical adenopathy.   Skin:     General: Skin is warm and dry.      Coloration: Skin is not pale.      Findings: No rash.   Neurological:      General: No focal deficit present.      Mental Status: He is alert and oriented to person, place, and time.      Motor: No abnormal muscle tone.      Coordination: Coordination normal.   Psychiatric:         Mood and Affect: Mood normal.         Behavior: Behavior normal.         Thought Content: Thought content normal.         Judgment: Judgment normal.           Assessment/Plan    Diagnoses and all orders for this visit:    1. Chronic left shoulder pain (Primary)    2. High risk medication use        Patient here mostly to continue pain medication for chronic shoulder pain.  Stable on current regimen.  Has been seen by orthopedist and had imaging including MRIs etc.  Again strongly recommended further health maintenance including cancer screening as well as routine lab.  Encouraged to continue to decrease cigarette use.  No evidence of abuse nor misuse of pain medication which will be continued with close monitoring including office visits every 3 months.    This note includes information entered using a voice recognition dictation system.  Though reviewed, some nonsensible errors may remain.

## 2021-03-11 ENCOUNTER — TELEPHONE (OUTPATIENT)
Dept: FAMILY MEDICINE CLINIC | Facility: CLINIC | Age: 71
End: 2021-03-11

## 2021-03-11 DIAGNOSIS — M25.519 SHOULDER PAIN, UNSPECIFIED CHRONICITY, UNSPECIFIED LATERALITY: ICD-10-CM

## 2021-03-11 RX ORDER — HYDROCODONE BITARTRATE AND ACETAMINOPHEN 10; 325 MG/1; MG/1
1 TABLET ORAL EVERY 4 HOURS PRN
Qty: 42 TABLET | Refills: 0 | Status: SHIPPED | OUTPATIENT
Start: 2021-03-11 | End: 2021-03-18 | Stop reason: SDUPTHER

## 2021-03-18 DIAGNOSIS — M25.519 SHOULDER PAIN, UNSPECIFIED CHRONICITY, UNSPECIFIED LATERALITY: ICD-10-CM

## 2021-03-18 RX ORDER — HYDROCODONE BITARTRATE AND ACETAMINOPHEN 10; 325 MG/1; MG/1
1 TABLET ORAL EVERY 4 HOURS PRN
Qty: 42 TABLET | Refills: 0 | Status: SHIPPED | OUTPATIENT
Start: 2021-03-18 | End: 2021-03-25 | Stop reason: SDUPTHER

## 2021-03-22 ENCOUNTER — BULK ORDERING (OUTPATIENT)
Dept: CASE MANAGEMENT | Facility: OTHER | Age: 71
End: 2021-03-22

## 2021-03-22 DIAGNOSIS — Z23 IMMUNIZATION DUE: ICD-10-CM

## 2021-03-25 DIAGNOSIS — M25.519 SHOULDER PAIN, UNSPECIFIED CHRONICITY, UNSPECIFIED LATERALITY: ICD-10-CM

## 2021-03-25 RX ORDER — HYDROCODONE BITARTRATE AND ACETAMINOPHEN 10; 325 MG/1; MG/1
1 TABLET ORAL EVERY 4 HOURS PRN
Qty: 42 TABLET | Refills: 0 | Status: SHIPPED | OUTPATIENT
Start: 2021-03-25 | End: 2021-04-01 | Stop reason: SDUPTHER

## 2021-03-25 NOTE — TELEPHONE ENCOUNTER
Caller: Gallo Leary    Relationship: Self    Best call back number: [phone]    Medication needed:   Requested Prescriptions     Pending Prescriptions Disp Refills   • HYDROcodone-acetaminophen (NORCO)  MG per tablet 42 tablet 0     Sig: Take 1 tablet by mouth Every 4 (Four) Hours As Needed for Moderate Pain  or Severe Pain  (Please have ready for Sunday Pickup). Okay to fill prescription SUNDAY       When do you need the refill by: 3/28/2021    Does the patient have less than a 3 day supply:  [x] Yes  [] No    What is the patient's preferred pharmacy: [facility] DRUG STORE #[id number] [facility] [address] AT SEC OF [facility]([address] & [facility] [phone] [facility] [phone] FX

## 2021-04-01 ENCOUNTER — TELEPHONE (OUTPATIENT)
Dept: FAMILY MEDICINE CLINIC | Facility: CLINIC | Age: 71
End: 2021-04-01

## 2021-04-01 DIAGNOSIS — M25.519 SHOULDER PAIN, UNSPECIFIED CHRONICITY, UNSPECIFIED LATERALITY: ICD-10-CM

## 2021-04-01 RX ORDER — HYDROCODONE BITARTRATE AND ACETAMINOPHEN 10; 325 MG/1; MG/1
1 TABLET ORAL EVERY 4 HOURS PRN
Qty: 42 TABLET | Refills: 0 | Status: SHIPPED | OUTPATIENT
Start: 2021-04-01 | End: 2021-04-08 | Stop reason: SDUPTHER

## 2021-04-01 NOTE — TELEPHONE ENCOUNTER
PT REQUEST A REFILL OF HIS MEDS PLEASE.    HYDROcodone-acetaminophen (NORCO)  MG per tablet [88486]    PT HAS REQUESTED A Saturday  DUE TO Sunday BEING EASTER.    Pharmacy:   [facility] DRUG STORE #[id number] [place] [id number] AT [facility] OF [facility]([address] & [facility] - [phone] [facility] [phone] FX      THANK YOU,    KORTNEY WILSON- [phone]

## 2021-04-08 ENCOUNTER — TELEPHONE (OUTPATIENT)
Dept: FAMILY MEDICINE CLINIC | Facility: CLINIC | Age: 71
End: 2021-04-08

## 2021-04-08 DIAGNOSIS — M25.519 SHOULDER PAIN, UNSPECIFIED CHRONICITY, UNSPECIFIED LATERALITY: ICD-10-CM

## 2021-04-08 RX ORDER — HYDROCODONE BITARTRATE AND ACETAMINOPHEN 10; 325 MG/1; MG/1
1 TABLET ORAL EVERY 4 HOURS PRN
Qty: 42 TABLET | Refills: 0 | Status: SHIPPED | OUTPATIENT
Start: 2021-04-08 | End: 2021-04-15 | Stop reason: SDUPTHER

## 2021-04-08 NOTE — TELEPHONE ENCOUNTER
"PT CALLED AND ASKED FOR A REFILL OF HIS MED.    HYDROcodone-acetaminophen (NORCO)  MG per tablet [33448]        Pharmacy:   [facility] DR[facility] STORE #[id number] - [facility] [address] AT [facility] OF [facility]([address] & [facility] - [phone] [facility] [phone] FX      PLEASE NOTE IT MUST SAY \"PT CAN  MEDS ON Sunday\" PLEASE     THANK YOU  "

## 2021-04-15 DIAGNOSIS — M25.519 SHOULDER PAIN, UNSPECIFIED CHRONICITY, UNSPECIFIED LATERALITY: ICD-10-CM

## 2021-04-15 RX ORDER — HYDROCODONE BITARTRATE AND ACETAMINOPHEN 10; 325 MG/1; MG/1
1 TABLET ORAL EVERY 4 HOURS PRN
Qty: 42 TABLET | Refills: 0 | Status: SHIPPED | OUTPATIENT
Start: 2021-04-15 | End: 2021-04-22 | Stop reason: SDUPTHER

## 2021-04-15 NOTE — TELEPHONE ENCOUNTER
Gallo Leary (Self) [phone] (H)       Caller: Gallo Leary    Relationship: Self    Best call back number:     Medication needed:   Requested Prescriptions     Pending Prescriptions Disp Refills   • HYDROcodone-acetaminophen (NORCO)  MG per tablet 42 tablet 0     Sig: Take 1 tablet by mouth Every 4 (Four) Hours As Needed for Moderate Pain  or Severe Pain  (Please have ready for Sunday Pickup). Okay to fill prescription SUNDAY       When do you need the refill by: SOON    What additional details did the patient provide when requesting the medication:     *NEEDS TO BE PICKED UP ON Sunday *ON THE BOTTLE     Does the patient have less than a 3 day supply:  [] Yes  [x] No    What is the patient's preferred pharmacy: [facility] DRUG STORE #[id number] [facility] [address] AT [facility] OF [facility]([address] & [facility] [phone] [facility] [phone] FX           DELETE AFTER READING TO PATIENT: “Thank you for sharing this

## 2021-04-22 DIAGNOSIS — M25.519 SHOULDER PAIN, UNSPECIFIED CHRONICITY, UNSPECIFIED LATERALITY: ICD-10-CM

## 2021-04-22 RX ORDER — HYDROCODONE BITARTRATE AND ACETAMINOPHEN 10; 325 MG/1; MG/1
1 TABLET ORAL EVERY 4 HOURS PRN
Qty: 42 TABLET | Refills: 0 | Status: SHIPPED | OUTPATIENT
Start: 2021-04-22 | End: 2021-04-29 | Stop reason: SDUPTHER

## 2021-04-22 NOTE — TELEPHONE ENCOUNTER
Caller: Gallo Leary    Relationship: Self    Best call back number: [phone]    Medication needed:   Requested Prescriptions     Pending Prescriptions Disp Refills   • HYDROcodone-acetaminophen (NORCO)  MG per tablet 42 tablet 0     Sig: Take 1 tablet by mouth Every 4 (Four) Hours As Needed for Moderate Pain  or Severe Pain  (Please have ready for Sunday Pickup). Okay to fill prescription SUNDAY       When do you need the refill by: ASAP    Does the patient have less than a 3 day supply:  [x] Yes  [] No    What is the patient's preferred pharmacy: [facility] DR[facility] STORE #[id number] [facility] [address] AT SEC OF [facility]([address] & [facility] [phone] [facility] [phone] FX

## 2021-04-29 ENCOUNTER — TELEPHONE (OUTPATIENT)
Dept: FAMILY MEDICINE CLINIC | Facility: CLINIC | Age: 71
End: 2021-04-29

## 2021-04-29 DIAGNOSIS — M25.519 SHOULDER PAIN, UNSPECIFIED CHRONICITY, UNSPECIFIED LATERALITY: ICD-10-CM

## 2021-04-29 RX ORDER — HYDROCODONE BITARTRATE AND ACETAMINOPHEN 10; 325 MG/1; MG/1
1 TABLET ORAL EVERY 4 HOURS PRN
Qty: 42 TABLET | Refills: 0 | Status: SHIPPED | OUTPATIENT
Start: 2021-04-29 | End: 2021-04-30 | Stop reason: ALTCHOICE

## 2021-04-29 NOTE — TELEPHONE ENCOUNTER
PT NEEDS REFILL ON:  *HYDROCODONE 10/325#42  PHARM#[phone] [place]    PT NOW HAS TO WORK ON Sunday PLEASE PUT:MAY PICK-UP ON SATURDAY ON THE RX  PT'S#[phone]

## 2021-04-30 ENCOUNTER — TELEPHONE (OUTPATIENT)
Dept: FAMILY MEDICINE CLINIC | Facility: CLINIC | Age: 71
End: 2021-04-30

## 2021-04-30 DIAGNOSIS — M25.512 CHRONIC LEFT SHOULDER PAIN: Primary | ICD-10-CM

## 2021-04-30 DIAGNOSIS — M25.519 SHOULDER PAIN, UNSPECIFIED CHRONICITY, UNSPECIFIED LATERALITY: ICD-10-CM

## 2021-04-30 DIAGNOSIS — G89.29 CHRONIC LEFT SHOULDER PAIN: Primary | ICD-10-CM

## 2021-04-30 RX ORDER — HYDROCODONE BITARTRATE AND ACETAMINOPHEN 10; 325 MG/1; MG/1
1 TABLET ORAL EVERY 4 HOURS PRN
Qty: 42 TABLET | Refills: 0 | Status: SHIPPED | OUTPATIENT
Start: 2021-04-30 | End: 2021-05-06 | Stop reason: SDUPTHER

## 2021-04-30 NOTE — TELEPHONE ENCOUNTER
PT'S REFILL FOR HYDROCODONE WAS SENT IN YESTERDAY, BUT HE IS NOW WORKING ON SUNDAYS AND WOULD LIKE TO BE ABLE TO PICK IT UP ON Saturday.  IF THIS IS OK PLEASE CONTACT PHARMACY AND ADVISE.  PHARM#[phone] [place]  PT'S#[phone]

## 2021-05-06 ENCOUNTER — TELEPHONE (OUTPATIENT)
Dept: FAMILY MEDICINE CLINIC | Facility: CLINIC | Age: 71
End: 2021-05-06

## 2021-05-06 DIAGNOSIS — G89.29 CHRONIC LEFT SHOULDER PAIN: ICD-10-CM

## 2021-05-06 DIAGNOSIS — M25.512 CHRONIC LEFT SHOULDER PAIN: ICD-10-CM

## 2021-05-06 RX ORDER — HYDROCODONE BITARTRATE AND ACETAMINOPHEN 10; 325 MG/1; MG/1
1 TABLET ORAL EVERY 4 HOURS PRN
Qty: 42 TABLET | Refills: 0 | Status: SHIPPED | OUTPATIENT
Start: 2021-05-06 | End: 2021-05-13 | Stop reason: SDUPTHER

## 2021-05-06 NOTE — TELEPHONE ENCOUNTER
"PT CALLED AND ASKED FOR A REFILL OF HIS MED.     HYDROcodone-acetaminophen (NORCO)  MG per tablet [59676]           Pharmacy:   [facility]ORE #[id number] - [facility] [address] AT [facility] OF [facility]([address] & [facility] - [phone] [facility] [phone] FX        PLEASE NOTE IT MUST SAY \"PT CAN  MEDS ON SATURDAY\" PLEASE     THANK YOU  "

## 2021-05-13 ENCOUNTER — TELEPHONE (OUTPATIENT)
Dept: FAMILY MEDICINE CLINIC | Facility: CLINIC | Age: 71
End: 2021-05-13

## 2021-05-13 DIAGNOSIS — G89.29 CHRONIC LEFT SHOULDER PAIN: ICD-10-CM

## 2021-05-13 DIAGNOSIS — M25.512 CHRONIC LEFT SHOULDER PAIN: ICD-10-CM

## 2021-05-13 RX ORDER — HYDROCODONE BITARTRATE AND ACETAMINOPHEN 10; 325 MG/1; MG/1
1 TABLET ORAL EVERY 4 HOURS PRN
Qty: 42 TABLET | Refills: 0 | Status: SHIPPED | OUTPATIENT
Start: 2021-05-13 | End: 2021-05-20 | Stop reason: SDUPTHER

## 2021-05-13 NOTE — TELEPHONE ENCOUNTER
PT NEEDS REFILL ON:  *HYDROCODONE 10/325#42  PHARM#[phone] JULIETTE  MUST SAY**MAY PICK-UP ON Saturday**  PT'S#[phone]

## 2021-05-20 DIAGNOSIS — G89.29 CHRONIC LEFT SHOULDER PAIN: ICD-10-CM

## 2021-05-20 DIAGNOSIS — M25.512 CHRONIC LEFT SHOULDER PAIN: ICD-10-CM

## 2021-05-20 RX ORDER — HYDROCODONE BITARTRATE AND ACETAMINOPHEN 10; 325 MG/1; MG/1
1 TABLET ORAL EVERY 4 HOURS PRN
Qty: 42 TABLET | Refills: 0 | Status: SHIPPED | OUTPATIENT
Start: 2021-05-20 | End: 2021-05-27 | Stop reason: SDUPTHER

## 2021-05-20 NOTE — TELEPHONE ENCOUNTER
PT NEEDS REFILL ON:  *HYDROCODONE 10/325#42   PHARM#[phone] J[facility]ET[facility]  MAY PICK-UP ON SATURDAY  PT'S#[phone]

## 2021-05-27 ENCOUNTER — TELEPHONE (OUTPATIENT)
Dept: FAMILY MEDICINE CLINIC | Facility: CLINIC | Age: 71
End: 2021-05-27

## 2021-05-27 DIAGNOSIS — M25.512 CHRONIC LEFT SHOULDER PAIN: ICD-10-CM

## 2021-05-27 DIAGNOSIS — G89.29 CHRONIC LEFT SHOULDER PAIN: ICD-10-CM

## 2021-05-27 RX ORDER — HYDROCODONE BITARTRATE AND ACETAMINOPHEN 10; 325 MG/1; MG/1
1 TABLET ORAL EVERY 4 HOURS PRN
Qty: 42 TABLET | Refills: 0 | Status: SHIPPED | OUTPATIENT
Start: 2021-05-27 | End: 2021-06-03 | Stop reason: SDUPTHER

## 2021-05-27 NOTE — TELEPHONE ENCOUNTER
LOv 3/9.  I attempted to contact patient unable to leave a voicemail.  Pt was probably requesting a refill on his hydrocodone and I am sending a refill request to Dr. Dominguez. Hub please inform patient

## 2021-05-27 NOTE — TELEPHONE ENCOUNTER
Caller: Gallo Leary    Relationship to patient: Self    Best call back number: [phone]    Patient is needing: PATIENT REQUESTING TO SPEAK WITH ELIO OR NEERU, IN REGARDS TO A PRESCRIPTION.PLEASE ADVISE.

## 2021-05-28 NOTE — TELEPHONE ENCOUNTER
LAST SEEN: 9/1  LAST FILL: 9/14  LAST ZION: 7/20    OK TO FILL        RN cannot approve Refill Request    RN can NOT refill this medication med is not covered by policy/route to provider. Last office visit: 4/24/2019 Mario Lepe MD Last Physical: 12/14/2018 Last MTM visit: Visit date not found Last visit same specialty: 4/24/2019 Mario Lepe MD.  Next visit within 3 mo: Visit date not found  Next physical within 3 mo: Visit date not found      Jyotsna Herr, [facility] Triage/Med Refill 5/12/2019    Requested Prescriptions   Pending Prescriptions Disp Refills     amiodarone (PACERONE) 200 MG tablet [Pharmacy Med Name: AMIODARONE  MG TABLET] 90 tablet 0     Sig: TAKE 1 TABLET BY MOUTH THREE TIMES A DAY       Refill Protocol for Amiodarone Failed - 5/11/2019  1:40 PM        Failed - Amiodarone level in last 3 months     No components found for: AMIODARO                Failed - Eye exam in last 3 months     Eye exam: No results found for this or any previous visit. No results found for this or any previous visit.  No exam data present If protocol doesn't pull in eye exam; should not prevent refill.          Passed - TSH in last 6 months     TSH   Date Value Ref Range Status   04/15/2019 3.70 0.30 - 5.00 uIU/mL Final                   Passed - LFT or AST or ALT in last 12 months     Albumin   Date Value Ref Range Status   04/15/2019 3.4 (L) 3.5 - 5.0 g/dL Final     Bilirubin, Total   Date Value Ref Range Status   04/15/2019 0.5 0.0 - 1.0 mg/dL Final     Bilirubin, Direct   Date Value Ref Range Status   05/18/2016 0.2 <=0.5 mg/dL Final     Alkaline Phosphatase   Date Value Ref Range Status   04/15/2019 59 45 - 120 U/L Final     AST   Date Value Ref Range Status   04/15/2019 20 0 - 40 U/L Final     ALT   Date Value Ref Range Status   04/15/2019 35 0 - 45 U/L Final     Protein, Total   Date Value Ref Range Status   04/15/2019 5.8 (L) 6.0 - 8.0 g/dL Final                Passed - PCP or prescribing provider visit in past 6 months     Last office visit with  prescriber/PCP: 4/24/2019 OR same dept: 4/24/2019 Mario Lepe MD OR same specialty: 4/24/2019 Mario Lepe MD Last physical: 12/14/2018  Last MTM visit: Visit date not found     Next appt within 3 mo: Visit date not found  Next physical within 3 mo: Visit date not found  Prescriber OR PCP: Mario Lepe MD  Last diagnosis associated with med order: 1. Paroxysmal ventricular tachycardia (H)  - furosemide (LASIX) 20 MG tablet; TAKE 1 TABLET BY MOUTH EVERY DAY  Dispense: 30 tablet; Refill: 0  - amiodarone (PACERONE) 200 MG tablet [Pharmacy Med Name: AMIODARONE  MG TABLET]; TAKE 1 TABLET BY MOUTH THREE TIMES A DAY  Dispense: 90 tablet; Refill: 0  - mexiletine (MEXITIL) 150 MG capsule [Pharmacy Med Name: MEXILETINE 150 MG CAPSULE]; TAKE 1 CAPSULE BY MOUTH THREE TIMES A DAY WITH MEALS  Dispense: 90 capsule; Refill: 0  - metoprolol succinate (TOPROL-XL) 100 MG 24 hr tablet; TAKE 1 TABLET BY MOUTH EVERY DAY  Dispense: 30 tablet; Refill: 0     If protocol passes may refill for 6 months if within 3 months of last provider visit (or a total of 9 months).          Passed - BMP in last 12 months     Sodium   Date Value Ref Range Status   04/18/2019 142 136 - 145 mmol/L Final     Potassium   Date Value Ref Range Status   04/18/2019 3.8 3.5 - 5.0 mmol/L Final     Chloride   Date Value Ref Range Status   04/18/2019 118 (H) 98 - 107 mmol/L Final     CO2   Date Value Ref Range Status   04/18/2019 18 (L) 22 - 31 mmol/L Final     BUN   Date Value Ref Range Status   04/18/2019 25 8 - 28 mg/dL Final     Creatinine   Date Value Ref Range Status   04/18/2019 1.42 (H) 0.70 - 1.30 mg/dL Final             Passed - CBC w/plts (HM2) in last 12 months     WBC   Date Value Ref Range Status   04/17/2019 3.7 (L) 4.0 - 11.0 thou/uL Final     Hemoglobin   Date Value Ref Range Status   04/17/2019 11.5 (L) 14.0 - 18.0 g/dL Final     Hematocrit   Date Value Ref Range Status   04/17/2019 34.4 (L) 40.0 - 54.0 % Final      Platelets   Date Value Ref Range Status   04/17/2019 112 (L) 140 - 440 thou/uL Final             Passed - ECG in last 12 months     ECG rhythm strip: No results found for this or any previous visit. ECG 12 lead MUSE:   Results for orders placed or performed during the hospital encounter of 04/16/19   ECG 12 lead   Result Value Ref Range    SYSTOLIC BLOOD PRESSURE  mmHg    DIASTOLIC BLOOD PRESSURE  mmHg    VENTRICULAR RATE 80 BPM    ATRIAL RATE 77 BPM    P-R INTERVAL  ms    QRS DURATION 162 ms    Q-T INTERVAL 524 ms    QTC CALCULATION (BEZET) 604 ms    P Axis  degrees    R AXIS 245 degrees    T AXIS 60 degrees    MUSE DIAGNOSIS       AV sequential or dual chamber electronic pacemaker  When compared with ECG of 16-APR-2019 13:28,  Electronic ventricular pacemaker has replaced Wide QRS rhythm  Vent. rate has decreased BY  57 BPM  Confirmed by YUE PARK MD LOC:WW ([id number]) on 4/17/2019 11:52:45 AM      ECG 12 lead nursing unit:   Results for orders placed or performed during the hospital encounter of 04/16/19   ECG 12 lead nursing unit performed   Result Value Ref Range    SYSTOLIC BLOOD PRESSURE 95 mmHg    DIASTOLIC BLOOD PRESSURE 65 mmHg    VENTRICULAR RATE 137 BPM    ATRIAL RATE 137 BPM    P-R INTERVAL 136 ms    QRS DURATION 192 ms    Q-T INTERVAL 342 ms    QTC CALCULATION (BEZET) 516 ms    P Axis 44 degrees    R AXIS 76 degrees    T AXIS 261 degrees    MUSE DIAGNOSIS       Wide QRS rhythm  Ventricular tachycardia  Abnormal ECG  When compared with ECG of 14-DEC-2018 15:28,  Ventricular tachycardia has replaced AV sequential or dual chamber electronic pacemaker  Vent. rate has increased BY  77 BPM  Confirmed by SHIV RUEDA MD LOC:JN ([id number]) on 4/16/2019 2:41:53 PM               Passed - Mg level in last 3 months     Magnesium   Date Value Ref Range Status   04/18/2019 1.8 1.8 - 2.6 mg/dL Final              Passed - Chest x-ray in last 3 months     Chest x-ray: No results found for this or any previous visit.  No results found for this or any previous visit.          mexiletine (MEXITIL) 150 MG capsule [Pharmacy Med Name: MEXILETINE 150 MG CAPSULE] 90 capsule 0     Sig: TAKE 1 CAPSULE BY MOUTH THREE TIMES A DAY WITH MEALS       Refill Protocol for Dronedarone, Mexiletine, Propafenone Passed - 5/11/2019  1:40 PM        Passed - LFT or AST or ALT on file in last 12 months     Albumin   Date Value Ref Range Status   04/15/2019 3.4 (L) 3.5 - 5.0 g/dL Final     Bilirubin, Total   Date Value Ref Range Status   04/15/2019 0.5 0.0 - 1.0 mg/dL Final     Bilirubin, Direct   Date Value Ref Range Status   05/18/2016 0.2 <=0.5 mg/dL Final     Alkaline Phosphatase   Date Value Ref Range Status   04/15/2019 59 45 - 120 U/L Final     AST   Date Value Ref Range Status   04/15/2019 20 0 - 40 U/L Final     ALT   Date Value Ref Range Status   04/15/2019 35 0 - 45 U/L Final     Protein, Total   Date Value Ref Range Status   04/15/2019 5.8 (L) 6.0 - 8.0 g/dL Final                Passed - PCP or prescribing provider visit in past 6 months or next 3 months     Last office visit with prescriber/PCP: 4/24/2019 OR same dept: 4/24/2019 Mario Lepe MD OR same specialty: 4/24/2019 Mario Lepe MD Last physical: 12/14/2018 Last MTM visit: Visit date not found     Next appt within 3 mo: Visit date not found  Next physical within 3 mo: Visit date not found  Prescriber OR PCP: Mario Lepe MD  Last diagnosis associated with med order: 1. Paroxysmal ventricular tachycardia (H)  - furosemide (LASIX) 20 MG tablet; TAKE 1 TABLET BY MOUTH EVERY DAY  Dispense: 30 tablet; Refill: 0  - amiodarone (PACERONE) 200 MG tablet [Pharmacy Med Name: AMIODARONE  MG TABLET]; TAKE 1 TABLET BY MOUTH THREE TIMES A DAY  Dispense: 90 tablet; Refill: 0  - mexiletine (MEXITIL) 150 MG capsule [Pharmacy Med Name: MEXILETINE 150 MG CAPSULE]; TAKE 1 CAPSULE BY MOUTH THREE TIMES A DAY WITH MEALS  Dispense: 90 capsule; Refill: 0  - metoprolol succinate  (TOPROL-XL) 100 MG 24 hr tablet; TAKE 1 TABLET BY MOUTH EVERY DAY  Dispense: 30 tablet; Refill: 0    If protocol passes may refill for 6 months if within 3 months of last provider visit (or a total of 9 months).              Passed - BMP on file in last 12 months     Sodium   Date Value Ref Range Status   04/18/2019 142 136 - 145 mmol/L Final     Potassium   Date Value Ref Range Status   04/18/2019 3.8 3.5 - 5.0 mmol/L Final     Chloride   Date Value Ref Range Status   04/18/2019 118 (H) 98 - 107 mmol/L Final     CO2   Date Value Ref Range Status   04/18/2019 18 (L) 22 - 31 mmol/L Final     BUN   Date Value Ref Range Status   04/18/2019 25 8 - 28 mg/dL Final     Creatinine   Date Value Ref Range Status   04/18/2019 1.42 (H) 0.70 - 1.30 mg/dL Final             Passed - CBC w/plts (hm2) on file in last 12 months     WBC   Date Value Ref Range Status   04/17/2019 3.7 (L) 4.0 - 11.0 thou/uL Final     Hemoglobin   Date Value Ref Range Status   04/17/2019 11.5 (L) 14.0 - 18.0 g/dL Final     Hematocrit   Date Value Ref Range Status   04/17/2019 34.4 (L) 40.0 - 54.0 % Final     Platelets   Date Value Ref Range Status   04/17/2019 112 (L) 140 - 440 thou/uL Final             Passed - ECG in last 12 months     ECG rhythm strip: No results found for this or any previous visit. ECG 12 lead MUSE:   Results for orders placed or performed during the hospital encounter of 04/16/19   ECG 12 lead   Result Value Ref Range    SYSTOLIC BLOOD PRESSURE  mmHg    DIASTOLIC BLOOD PRESSURE  mmHg    VENTRICULAR RATE 80 BPM    ATRIAL RATE 77 BPM    P-R INTERVAL  ms    QRS DURATION 162 ms    Q-T INTERVAL 524 ms    QTC CALCULATION (BEZET) 604 ms    P Axis  degrees    R AXIS 245 degrees    T AXIS 60 degrees    MUSE DIAGNOSIS       AV sequential or dual chamber electronic pacemaker  When compared with ECG of 16-APR-2019 13:28,  Electronic ventricular pacemaker has replaced Wide QRS rhythm  Vent. rate has decreased BY  57 BPM  Confirmed by VIVIAN RAYMUNDO,  YUE LOC:WW ([id number]) on 4/17/2019 11:52:45 AM      ECG 12 lead nursing unit:   Results for orders placed or performed during the hospital encounter of 04/16/19   ECG 12 lead nursing unit performed   Result Value Ref Range    SYSTOLIC BLOOD PRESSURE 95 mmHg    DIASTOLIC BLOOD PRESSURE 65 mmHg    VENTRICULAR RATE 137 BPM    ATRIAL RATE 137 BPM    P-R INTERVAL 136 ms    QRS DURATION 192 ms    Q-T INTERVAL 342 ms    QTC CALCULATION (BEZET) 516 ms    P Axis 44 degrees    R AXIS 76 degrees    T AXIS 261 degrees    MUSE DIAGNOSIS       Wide QRS rhythm  Ventricular tachycardia  Abnormal ECG  When compared with ECG of 14-DEC-2018 15:28,  Ventricular tachycardia has replaced AV sequential or dual chamber electronic pacemaker  Vent. rate has increased BY  77 BPM  Confirmed by SHIV RUEDA MD LOC:JN ([id number]) on 4/16/2019 2:41:53 PM             Signed Prescriptions Disp Refills    furosemide (LASIX) 20 MG tablet 30 tablet 0     Sig: TAKE 1 TABLET BY MOUTH EVERY DAY       Diuretics/Combination Diuretics Refill Protocol  Passed - 5/11/2019  1:40 PM        Passed - Visit with PCP or prescribing provider visit in past 12 months     Last office visit with prescriber/PCP: 4/24/2019 Mario Lepe MD OR same dept: 4/24/2019 Mario Lepe MD OR same specialty: 4/24/2019 Mario Lepe MD  Last physical: 12/14/2018 Last MTM visit: Visit date not found   Next visit within 3 mo: Visit date not found  Next physical within 3 mo: Visit date not found  Prescriber OR PCP: Mario Lepe MD  Last diagnosis associated with med order: 1. Paroxysmal ventricular tachycardia (H)  - furosemide (LASIX) 20 MG tablet; TAKE 1 TABLET BY MOUTH EVERY DAY  Dispense: 30 tablet; Refill: 0  - amiodarone (PACERONE) 200 MG tablet [Pharmacy Med Name: AMIODARONE  MG TABLET]; TAKE 1 TABLET BY MOUTH THREE TIMES A DAY  Dispense: 90 tablet; Refill: 0  - mexiletine (MEXITIL) 150 MG capsule [Pharmacy Med Name: MEXILETINE 150 MG CAPSULE];  TAKE 1 CAPSULE BY MOUTH THREE TIMES A DAY WITH MEALS  Dispense: 90 capsule; Refill: 0  - metoprolol succinate (TOPROL-XL) 100 MG 24 hr tablet; TAKE 1 TABLET BY MOUTH EVERY DAY  Dispense: 30 tablet; Refill: 0    If protocol passes may refill for 12 months if within 3 months of last provider visit (or a total of 15 months).             Passed - Serum Potassium in past 12 months      Lab Results   Component Value Date    Potassium 3.8 04/18/2019             Passed - Serum Sodium in past 12 months      Lab Results   Component Value Date    Sodium 142 04/18/2019             Passed - Blood pressure on file in past 12 months     BP Readings from Last 1 Encounters:   04/24/19 96/68             Passed - Serum Creatinine in past 12 months      Creatinine   Date Value Ref Range Status   04/18/2019 1.42 (H) 0.70 - 1.30 mg/dL Final            metoprolol succinate (TOPROL-XL) 100 MG 24 hr tablet 30 tablet 0     Sig: TAKE 1 TABLET BY MOUTH EVERY DAY       Beta-Blockers Refill Protocol Passed - 5/11/2019  1:40 PM        Passed - PCP or prescribing provider visit in past 12 months or next 3 months     Last office visit with prescriber/PCP: 4/24/2019 Mario Lepe MD OR same dept: 4/24/2019 Mario Lepe MD OR same specialty: 4/24/2019 Mario Lepe MD  Last physical: 12/14/2018 Last MTM visit: Visit date not found   Next visit within 3 mo: Visit date not found  Next physical within 3 mo: Visit date not found  Prescriber OR PCP: Mario Lepe MD  Last diagnosis associated with med order: 1. Paroxysmal ventricular tachycardia (H)  - furosemide (LASIX) 20 MG tablet; TAKE 1 TABLET BY MOUTH EVERY DAY  Dispense: 30 tablet; Refill: 0  - amiodarone (PACERONE) 200 MG tablet [Pharmacy Med Name: AMIODARONE  MG TABLET]; TAKE 1 TABLET BY MOUTH THREE TIMES A DAY  Dispense: 90 tablet; Refill: 0  - mexiletine (MEXITIL) 150 MG capsule [Pharmacy Med Name: MEXILETINE 150 MG CAPSULE]; TAKE 1 CAPSULE BY MOUTH THREE  TIMES A DAY WITH MEALS  Dispense: 90 capsule; Refill: 0  - metoprolol succinate (TOPROL-XL) 100 MG 24 hr tablet; TAKE 1 TABLET BY MOUTH EVERY DAY  Dispense: 30 tablet; Refill: 0    If protocol passes may refill for 12 months if within 3 months of last provider visit (or a total of 15 months).             Passed - Blood pressure filed in past 12 months     BP Readings from Last 1 Encounters:   04/24/19 96/68

## 2021-06-03 ENCOUNTER — TELEPHONE (OUTPATIENT)
Dept: FAMILY MEDICINE CLINIC | Facility: CLINIC | Age: 71
End: 2021-06-03

## 2021-06-03 DIAGNOSIS — G89.29 CHRONIC LEFT SHOULDER PAIN: ICD-10-CM

## 2021-06-03 DIAGNOSIS — M25.512 CHRONIC LEFT SHOULDER PAIN: ICD-10-CM

## 2021-06-03 RX ORDER — HYDROCODONE BITARTRATE AND ACETAMINOPHEN 10; 325 MG/1; MG/1
1 TABLET ORAL EVERY 4 HOURS PRN
Qty: 42 TABLET | Refills: 0 | Status: SHIPPED | OUTPATIENT
Start: 2021-06-03 | End: 2021-06-10 | Stop reason: SDUPTHER

## 2021-06-03 NOTE — TELEPHONE ENCOUNTER
Caller: Kortney Leary    Relationship: Self    Best call back number: [phone]     What is the best time to reach you: ANYTIME  Who are you requesting to speak with (clinical staff, provider,  specific staff member): ELIO    Do you know the name of the person who called: KORTNEY    What was the call regarding:QUESTION ABOUT MEDICATION  Do you require a callback: YES

## 2021-06-03 NOTE — TELEPHONE ENCOUNTER
PT NEEDS REFILL ON  *HYDROCODONE 10/325#42  MAY PICK-UP ON Saturday  PHARM#[phone] [facility]  PT'S#[phone]

## 2021-06-10 ENCOUNTER — TELEPHONE (OUTPATIENT)
Dept: FAMILY MEDICINE CLINIC | Facility: CLINIC | Age: 71
End: 2021-06-10

## 2021-06-10 DIAGNOSIS — G89.29 CHRONIC LEFT SHOULDER PAIN: ICD-10-CM

## 2021-06-10 DIAGNOSIS — M25.512 CHRONIC LEFT SHOULDER PAIN: ICD-10-CM

## 2021-06-10 RX ORDER — HYDROCODONE BITARTRATE AND ACETAMINOPHEN 10; 325 MG/1; MG/1
1 TABLET ORAL EVERY 4 HOURS PRN
Qty: 42 TABLET | Refills: 0 | Status: SHIPPED | OUTPATIENT
Start: 2021-06-10 | End: 2021-06-17 | Stop reason: SDUPTHER

## 2021-06-17 DIAGNOSIS — G89.29 CHRONIC LEFT SHOULDER PAIN: ICD-10-CM

## 2021-06-17 DIAGNOSIS — M25.512 CHRONIC LEFT SHOULDER PAIN: ICD-10-CM

## 2021-06-17 RX ORDER — HYDROCODONE BITARTRATE AND ACETAMINOPHEN 10; 325 MG/1; MG/1
1 TABLET ORAL EVERY 4 HOURS PRN
Qty: 42 TABLET | Refills: 0 | Status: SHIPPED | OUTPATIENT
Start: 2021-06-17 | End: 2021-06-24 | Stop reason: SDUPTHER

## 2021-06-17 NOTE — TELEPHONE ENCOUNTER
PT NEEDS REFILL ON:  *HYDROCODONE 10/325#42  MAY PICK-UP ON Saturday  PHARM#[phone] [facility]  PT'S#[phone]

## 2021-06-18 ENCOUNTER — OFFICE VISIT (OUTPATIENT)
Dept: FAMILY MEDICINE CLINIC | Facility: CLINIC | Age: 71
End: 2021-06-18

## 2021-06-18 VITALS
SYSTOLIC BLOOD PRESSURE: 124 MMHG | TEMPERATURE: 98.3 F | HEART RATE: 84 BPM | WEIGHT: 223.2 LBS | BODY MASS INDEX: 30.23 KG/M2 | HEIGHT: 72 IN | RESPIRATION RATE: 20 BRPM | DIASTOLIC BLOOD PRESSURE: 74 MMHG | OXYGEN SATURATION: 95 %

## 2021-06-18 DIAGNOSIS — Z79.899 HIGH RISK MEDICATION USE: ICD-10-CM

## 2021-06-18 DIAGNOSIS — M25.512 CHRONIC LEFT SHOULDER PAIN: ICD-10-CM

## 2021-06-18 DIAGNOSIS — L81.9 PIGMENTED SKIN LESIONS: Primary | ICD-10-CM

## 2021-06-18 DIAGNOSIS — G89.29 CHRONIC LEFT SHOULDER PAIN: ICD-10-CM

## 2021-06-18 PROCEDURE — 99213 OFFICE O/P EST LOW 20 MIN: CPT | Performed by: FAMILY MEDICINE

## 2021-06-18 NOTE — PROGRESS NOTES
KIKI Leary is a 71 y.o. male who is here for follow up persistent shoulder pain.  Seen by orthopedist and no obvious etiology found.  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  Evaluated by orthopedist occluding MRI scans etc.  Pain currently controlled with limited amounts of pain medication and risks versus benefits discussed.  Ex-wife extremely concerned about skin lesion especially on his face.  Has multiple other lesions on his arms related to severe sun damage.      Review of Systems   Musculoskeletal: Positive for arthralgias.   Skin: Positive for color change.        Multiple pigmented skin lesions especially on the right forehead with irregularity etc.   All other systems reviewed and are negative.        No past medical history on file.    No past surgical history on file.    No family history on file.    Social History     Socioeconomic History   • Marital status:      Spouse name: Not on file   • Number of children: Not on file   • Years of education: Not on file   • Highest education level: Not on file   Tobacco Use   • Smoking status: Current Every Day Smoker     Packs/day: 0.50     Years: 50.00     Pack years: 25.00   • Smokeless tobacco: Never Used   Substance and Sexual Activity   • Alcohol use: Yes     Comment: very seldom 1 or twice a year.   • Drug use: No   • Sexual activity: Not Currently       Vitals:    06/18/21 1602   BP: 124/74   Pulse: 84   Resp: 20   Temp: 98.3 °F (36.8 °C)   SpO2: 95%        Body mass index is 30.26 kg/m².      Physical Exam  Vitals and nursing note reviewed.   Constitutional:       General: He is not in acute distress.     Appearance: He is well-developed.   HENT:      Head: Normocephalic and atraumatic.   Eyes:      Conjunctiva/sclera: Conjunctivae normal.      Pupils: Pupils are equal, round, and reactive to light.   Neck:      Thyroid: No thyromegaly.   Cardiovascular:      Rate and Rhythm:  Normal rate and regular rhythm.      Heart sounds: Normal heart sounds.   Pulmonary:      Effort: Pulmonary effort is normal. No respiratory distress.      Breath sounds: Normal breath sounds.   Abdominal:      General: There is no distension.      Palpations: Abdomen is soft. There is no mass.      Tenderness: There is no abdominal tenderness.      Hernia: No hernia is present.   Musculoskeletal:         General: No tenderness or deformity. Normal range of motion.      Cervical back: Normal range of motion.   Lymphadenopathy:      Cervical: No cervical adenopathy.   Skin:     General: Skin is warm and dry.      Coloration: Skin is not pale.      Findings: Lesion present. No rash.          Neurological:      General: No focal deficit present.      Mental Status: He is alert and oriented to person, place, and time.      Motor: No abnormal muscle tone.      Coordination: Coordination normal.   Psychiatric:         Mood and Affect: Mood normal.         Behavior: Behavior normal.         Thought Content: Thought content normal.         Judgment: Judgment normal.           Assessment/Plan    Diagnoses and all orders for this visit:    1. Pigmented skin lesions (Primary)  -     Ambulatory Referral to Dermatology    2. Chronic left shoulder pain    3. High risk medication use        Patient here mostly for pain control related to chronic shoulder pain.  Currently under adequate control with occasional pain medication which will be continued with close monitoring.    This note includes information entered using a voice recognition dictation system.  Though reviewed, some nonsensible errors may remain.

## 2021-06-24 ENCOUNTER — TELEPHONE (OUTPATIENT)
Dept: FAMILY MEDICINE CLINIC | Facility: CLINIC | Age: 71
End: 2021-06-24

## 2021-06-24 DIAGNOSIS — G89.29 CHRONIC LEFT SHOULDER PAIN: ICD-10-CM

## 2021-06-24 DIAGNOSIS — M25.512 CHRONIC LEFT SHOULDER PAIN: ICD-10-CM

## 2021-06-24 RX ORDER — HYDROCODONE BITARTRATE AND ACETAMINOPHEN 10; 325 MG/1; MG/1
1 TABLET ORAL EVERY 4 HOURS PRN
Qty: 42 TABLET | Refills: 0 | Status: SHIPPED | OUTPATIENT
Start: 2021-06-24 | End: 2021-07-01 | Stop reason: SDUPTHER

## 2021-06-24 NOTE — TELEPHONE ENCOUNTER
"PT CALLED AND ASKED FOR A REFILL OF HIS MED.     HYDROcodone-acetaminophen (NORCO)  MG per tablet [29028]           Pharmacy:   [facility] #[id number] - [facility] [address] AT [facility] OF [facility] & [facility] - [phone] [facility] [phone] FX        PLEASE NOTE IT MUST SAY \"PT CAN  MEDS ON SATURDAY\" PLEASE     THANK YOU  "

## 2021-06-25 ENCOUNTER — TELEPHONE (OUTPATIENT)
Dept: FAMILY MEDICINE CLINIC | Facility: CLINIC | Age: 71
End: 2021-06-25

## 2021-07-01 ENCOUNTER — TELEPHONE (OUTPATIENT)
Dept: FAMILY MEDICINE CLINIC | Facility: CLINIC | Age: 71
End: 2021-07-01

## 2021-07-01 DIAGNOSIS — M25.512 CHRONIC LEFT SHOULDER PAIN: ICD-10-CM

## 2021-07-01 DIAGNOSIS — G89.29 CHRONIC LEFT SHOULDER PAIN: ICD-10-CM

## 2021-07-01 RX ORDER — HYDROCODONE BITARTRATE AND ACETAMINOPHEN 10; 325 MG/1; MG/1
1 TABLET ORAL EVERY 4 HOURS PRN
Qty: 42 TABLET | Refills: 0 | Status: SHIPPED | OUTPATIENT
Start: 2021-07-01 | End: 2021-07-08 | Stop reason: SDUPTHER

## 2021-07-01 NOTE — TELEPHONE ENCOUNTER
THE PATIENT CALLED BACK IN TO RETURN THIS CALL. HE SPECIFICALLY ASKED FOR NEERU. THE HUB ATTEMPTED TO TRANSFER THIS CALL, BUT WAS UNSUCCESSFUL. PLEASE ADVISE,

## 2021-07-01 NOTE — TELEPHONE ENCOUNTER
Caller: Gallo Leary    Relationship: Self    Best call back number: [phone]    What is the best time to reach you: ANY TIME    Who are you requesting to speak with (clinical staff, provider,  specific staff member): NEERU     What was the call regarding: PATIENT WAS RETURNING A CALL REGARDING HIS PRESCRIPTIONS     Do you require a callback: YES PLEASE

## 2021-07-02 ENCOUNTER — TELEPHONE (OUTPATIENT)
Dept: FAMILY MEDICINE CLINIC | Facility: CLINIC | Age: 71
End: 2021-07-02

## 2021-07-02 NOTE — TELEPHONE ENCOUNTER
PT CALLED AND STATES THAT HIS PHARMACY WILL BE CLOSED THIS WEEKEND AND NEEDED TO  HIS MEDS TODAY. CAN WE PLEASE HAVE SOMEONE CALL THE PHARMACY AND APPROVE TODAY PICKUP PLEASE.    PLEASE CALL KORTNEY WILSON [phone] WITH UPDATE      THANK YOU

## 2021-07-08 DIAGNOSIS — M25.512 CHRONIC LEFT SHOULDER PAIN: ICD-10-CM

## 2021-07-08 DIAGNOSIS — G89.29 CHRONIC LEFT SHOULDER PAIN: ICD-10-CM

## 2021-07-08 RX ORDER — HYDROCODONE BITARTRATE AND ACETAMINOPHEN 10; 325 MG/1; MG/1
1 TABLET ORAL EVERY 4 HOURS PRN
Qty: 42 TABLET | Refills: 0 | Status: SHIPPED | OUTPATIENT
Start: 2021-07-08 | End: 2021-07-15 | Stop reason: SDUPTHER

## 2021-07-08 RX ORDER — HYDROCODONE BITARTRATE AND ACETAMINOPHEN 10; 325 MG/1; MG/1
1 TABLET ORAL EVERY 4 HOURS PRN
Qty: 42 TABLET | Refills: 0 | Status: CANCELLED | OUTPATIENT
Start: 2021-07-08

## 2021-07-08 NOTE — TELEPHONE ENCOUNTER
"PT CALLED AND ASKED FOR A REFILL OF HIS MED.     HYDROcodone-acetaminophen (NORCO)  MG per tablet [09069]           Pharmacy:   [facility] #[id number] - [facility] [address] AT [facility] OF [facility] & [facility] - [phone] [facility] [phone] FX        PLEASE NOTE IT MUST SAY \"PT CAN  MEDS ON FRIDAY\" PLEASE     THANK YOU  "

## 2021-07-15 DIAGNOSIS — M25.512 CHRONIC LEFT SHOULDER PAIN: ICD-10-CM

## 2021-07-15 DIAGNOSIS — G89.29 CHRONIC LEFT SHOULDER PAIN: ICD-10-CM

## 2021-07-15 RX ORDER — HYDROCODONE BITARTRATE AND ACETAMINOPHEN 10; 325 MG/1; MG/1
1 TABLET ORAL EVERY 4 HOURS PRN
Qty: 42 TABLET | Refills: 0 | Status: SHIPPED | OUTPATIENT
Start: 2021-07-15 | End: 2021-07-22 | Stop reason: SDUPTHER

## 2021-07-15 NOTE — TELEPHONE ENCOUNTER
"PT CALLED AND ASKED FOR A REFILL OF HIS MED.     HYDROcodone-acetaminophen (NORCO)  MG per tablet [57998]           Pharmacy:   [facility] #[id number] - [facility] [address] AT [facility] OF [facility] & [facility] - [phone] [facility] [phone] FX        PLEASE NOTE IT MUST SAY \"PT CAN  MEDS ON FRIDAY\" PLEASE     THANK YOU  "

## 2021-07-22 ENCOUNTER — TELEPHONE (OUTPATIENT)
Dept: FAMILY MEDICINE CLINIC | Facility: CLINIC | Age: 71
End: 2021-07-22

## 2021-07-22 DIAGNOSIS — G89.29 CHRONIC LEFT SHOULDER PAIN: ICD-10-CM

## 2021-07-22 DIAGNOSIS — M25.512 CHRONIC LEFT SHOULDER PAIN: ICD-10-CM

## 2021-07-22 RX ORDER — HYDROCODONE BITARTRATE AND ACETAMINOPHEN 10; 325 MG/1; MG/1
1 TABLET ORAL EVERY 4 HOURS PRN
Qty: 42 TABLET | Refills: 0 | Status: SHIPPED | OUTPATIENT
Start: 2021-07-22 | End: 2021-07-29 | Stop reason: SDUPTHER

## 2021-07-22 NOTE — TELEPHONE ENCOUNTER
"PT CALLED AND ASKED FOR A REFILL OF HIS MED.     HYDROcodone-acetaminophen (NORCO)  MG per tablet [64602]           Pharmacy:   [facility] #[id number] - [facility] [address] AT [facility] OF [facility] & [facility] - [phone] [facility] [phone] FX        PLEASE NOTE IT MUST SAY \"PT CAN  MEDS ON FRIDAY\" PLEASE     THANK YOU  "

## 2021-07-29 DIAGNOSIS — M25.512 CHRONIC LEFT SHOULDER PAIN: ICD-10-CM

## 2021-07-29 DIAGNOSIS — G89.29 CHRONIC LEFT SHOULDER PAIN: ICD-10-CM

## 2021-07-29 RX ORDER — HYDROCODONE BITARTRATE AND ACETAMINOPHEN 10; 325 MG/1; MG/1
1 TABLET ORAL EVERY 4 HOURS PRN
Qty: 42 TABLET | Refills: 0 | Status: SHIPPED | OUTPATIENT
Start: 2021-07-29 | End: 2021-08-05 | Stop reason: SDUPTHER

## 2021-07-29 NOTE — TELEPHONE ENCOUNTER
PT NEEDS REFILL ON:  *HYDROCODONE 10/325 #42  PHARM#[phone] JULIETTE  MAY REFILL ON Friday    PHARM CLOSES ON WEEKENDS  PT'S#[phone]

## 2021-08-05 DIAGNOSIS — G89.29 CHRONIC LEFT SHOULDER PAIN: ICD-10-CM

## 2021-08-05 DIAGNOSIS — M25.512 CHRONIC LEFT SHOULDER PAIN: ICD-10-CM

## 2021-08-05 RX ORDER — HYDROCODONE BITARTRATE AND ACETAMINOPHEN 10; 325 MG/1; MG/1
1 TABLET ORAL EVERY 4 HOURS PRN
Qty: 42 TABLET | Refills: 0 | Status: SHIPPED | OUTPATIENT
Start: 2021-08-05 | End: 2021-08-12 | Stop reason: SDUPTHER

## 2021-08-05 NOTE — TELEPHONE ENCOUNTER
PT NEEDS REFILL:  *HYDROCODONE 10/325#42  PHARM#[phone] JULIETTE  MAY REFILL ON Friday  PT'S#[phone]

## 2021-08-12 DIAGNOSIS — G89.29 CHRONIC LEFT SHOULDER PAIN: ICD-10-CM

## 2021-08-12 DIAGNOSIS — M25.512 CHRONIC LEFT SHOULDER PAIN: ICD-10-CM

## 2021-08-12 RX ORDER — HYDROCODONE BITARTRATE AND ACETAMINOPHEN 10; 325 MG/1; MG/1
1 TABLET ORAL EVERY 4 HOURS PRN
Qty: 42 TABLET | Refills: 0 | Status: SHIPPED | OUTPATIENT
Start: 2021-08-12 | End: 2021-08-19 | Stop reason: SDUPTHER

## 2021-08-12 NOTE — TELEPHONE ENCOUNTER
Rx Refill Note  Requested Prescriptions      No prescriptions requested or ordered in this encounter      Last office visit with prescribing clinician: 6/18/2021      Next office visit with prescribing clinician: 9/17/2021            Kanwal Carcamo MA  08/12/21, 09:58 EDT

## 2021-08-12 NOTE — TELEPHONE ENCOUNTER
PT NEEDS REFILL ON:  *HYDROCODNE 10/325#42  PHARM#[phone] JULIETTE  PLEASE PUT MAY PICK-UP Friday AM  PT'S#[phone]

## 2021-08-19 DIAGNOSIS — M25.512 CHRONIC LEFT SHOULDER PAIN: ICD-10-CM

## 2021-08-19 DIAGNOSIS — G89.29 CHRONIC LEFT SHOULDER PAIN: ICD-10-CM

## 2021-08-19 RX ORDER — HYDROCODONE BITARTRATE AND ACETAMINOPHEN 10; 325 MG/1; MG/1
1 TABLET ORAL EVERY 4 HOURS PRN
Qty: 42 TABLET | Refills: 0 | Status: SHIPPED | OUTPATIENT
Start: 2021-08-19 | End: 2021-08-26 | Stop reason: SDUPTHER

## 2021-08-19 NOTE — TELEPHONE ENCOUNTER
PT NEEDS REFILL ON:  *HYDROCODONE 10/325#42  PHARM#[phone] JULIETTE  MAY PICK-UP Friday MORNING  PT'S#[phone]

## 2021-08-19 NOTE — TELEPHONE ENCOUNTER
Rx Refill Note  Requested Prescriptions     Pending Prescriptions Disp Refills   • HYDROcodone-acetaminophen (NORCO)  MG per tablet 42 tablet 0     Sig: Take 1 tablet by mouth Every 4 (Four) Hours As Needed for Moderate Pain  or Severe Pain . Okay to  prescription Friday.      Last office visit with prescribing clinician: 6/18/2021      Next office visit with prescribing clinician: 9/17/2021            Kanwal Carcamo MA  08/19/21, 09:53 EDT

## 2021-08-26 ENCOUNTER — TELEPHONE (OUTPATIENT)
Dept: FAMILY MEDICINE CLINIC | Facility: CLINIC | Age: 71
End: 2021-08-26

## 2021-08-26 DIAGNOSIS — G89.29 CHRONIC LEFT SHOULDER PAIN: ICD-10-CM

## 2021-08-26 DIAGNOSIS — M25.512 CHRONIC LEFT SHOULDER PAIN: ICD-10-CM

## 2021-08-26 RX ORDER — HYDROCODONE BITARTRATE AND ACETAMINOPHEN 10; 325 MG/1; MG/1
1 TABLET ORAL EVERY 4 HOURS PRN
Qty: 42 TABLET | Refills: 0 | Status: SHIPPED | OUTPATIENT
Start: 2021-08-26 | End: 2021-09-02 | Stop reason: SDUPTHER

## 2021-08-26 NOTE — TELEPHONE ENCOUNTER
"PT CALLED AND ASKED FOR A REFILL OF HIS MED.     HYDROcodone-acetaminophen (NORCO)  MG per tablet [96342]           Pharmacy:   [facility] #[id number] - [facility] [address] AT [facility] OF [facility] & [facility] - [phone] [facility] [phone] FX        PLEASE NOTE IT MUST SAY \"PT CAN  MEDS ON Friday AM\" PLEASE     THANK YOU  "

## 2021-09-02 ENCOUNTER — TELEPHONE (OUTPATIENT)
Dept: FAMILY MEDICINE CLINIC | Facility: CLINIC | Age: 71
End: 2021-09-02

## 2021-09-02 DIAGNOSIS — M25.512 CHRONIC LEFT SHOULDER PAIN: Primary | ICD-10-CM

## 2021-09-02 DIAGNOSIS — G89.29 CHRONIC LEFT SHOULDER PAIN: ICD-10-CM

## 2021-09-02 DIAGNOSIS — G89.29 CHRONIC LEFT SHOULDER PAIN: Primary | ICD-10-CM

## 2021-09-02 DIAGNOSIS — M25.512 CHRONIC LEFT SHOULDER PAIN: ICD-10-CM

## 2021-09-02 RX ORDER — HYDROCODONE BITARTRATE AND ACETAMINOPHEN 10; 325 MG/1; MG/1
1 TABLET ORAL EVERY 4 HOURS PRN
Qty: 42 TABLET | Refills: 0 | Status: SHIPPED | OUTPATIENT
Start: 2021-09-02 | End: 2021-09-09 | Stop reason: SDUPTHER

## 2021-09-02 NOTE — TELEPHONE ENCOUNTER
Caller: Gallo Leary    Relationship: Self    Best call back number: [phone]     What is the best time to reach you: ANY    Who are you requesting to speak with (clinical staff, provider,  specific staff member): NEERU / ELIO    What was the call regarding: RETURNING A PHONE CALL. UNABLE TO VERIFY VIA PATIENT CHART    Do you require a callback: YES

## 2021-09-02 NOTE — TELEPHONE ENCOUNTER
Rx Refill Note  Requested Prescriptions      No prescriptions requested or ordered in this encounter      Last office visit with prescribing clinician: 6/18/2021      Next office visit with prescribing clinician: 9/17/2021            Kanwal Carcamo MA  09/02/21, 10:15 EDT

## 2021-09-08 ENCOUNTER — IMMUNIZATION (OUTPATIENT)
Dept: VACCINE CLINIC | Facility: HOSPITAL | Age: 71
End: 2021-09-08

## 2021-09-08 PROCEDURE — 91300 HC SARSCOV02 VAC 30MCG/0.3ML IM: CPT | Performed by: INTERNAL MEDICINE

## 2021-09-08 PROCEDURE — 0001A: CPT | Performed by: INTERNAL MEDICINE

## 2021-09-09 ENCOUNTER — TELEPHONE (OUTPATIENT)
Dept: FAMILY MEDICINE CLINIC | Facility: CLINIC | Age: 71
End: 2021-09-09

## 2021-09-09 DIAGNOSIS — G89.29 CHRONIC LEFT SHOULDER PAIN: ICD-10-CM

## 2021-09-09 DIAGNOSIS — M25.512 CHRONIC LEFT SHOULDER PAIN: ICD-10-CM

## 2021-09-09 RX ORDER — HYDROCODONE BITARTRATE AND ACETAMINOPHEN 10; 325 MG/1; MG/1
1 TABLET ORAL EVERY 4 HOURS PRN
Qty: 42 TABLET | Refills: 0 | Status: CANCELLED | OUTPATIENT
Start: 2021-09-09

## 2021-09-09 RX ORDER — HYDROCODONE BITARTRATE AND ACETAMINOPHEN 10; 325 MG/1; MG/1
1 TABLET ORAL EVERY 4 HOURS PRN
Qty: 42 TABLET | Refills: 0 | Status: SHIPPED | OUTPATIENT
Start: 2021-09-09 | End: 2021-09-16 | Stop reason: SDUPTHER

## 2021-09-09 NOTE — TELEPHONE ENCOUNTER
Rx Refill Note  Requested Prescriptions      No prescriptions requested or ordered in this encounter      Last office visit with prescribing clinician: 6/18/2021      Next office visit with prescribing clinician: 9/17/2021            Kanwal Carcamo MA  09/09/21, 10:46 EDT

## 2021-09-09 NOTE — TELEPHONE ENCOUNTER
Caller: Gallo Leary    Relationship: Self    Best call back number: [phone]     What is the best time to reach you: ANYRIME    Who are you requesting to speak with (clinical staff, provider,  specific staff member): NEERU / ELIO     Do you know the name of the person who called:     What was the call regarding: PATIENT IS RETURNING A CALL HE STATED HE STATED HE ISNT SURE WHAT THE CALL WAS ABOUT     Do you require a callback: YES

## 2021-09-09 NOTE — TELEPHONE ENCOUNTER
PT NEEDS REFILL:  *HYDROCODONE 10#42  MAY PICK-UP Friday MORNING  PHARM#[phone] JULIETTE  PT IS WAIT FOR APPT WITH PAIN MANAGEMENT

## 2021-09-16 ENCOUNTER — TELEPHONE (OUTPATIENT)
Dept: FAMILY MEDICINE CLINIC | Facility: CLINIC | Age: 71
End: 2021-09-16

## 2021-09-16 DIAGNOSIS — G89.29 CHRONIC LEFT SHOULDER PAIN: ICD-10-CM

## 2021-09-16 DIAGNOSIS — M25.512 CHRONIC LEFT SHOULDER PAIN: ICD-10-CM

## 2021-09-16 RX ORDER — HYDROCODONE BITARTRATE AND ACETAMINOPHEN 10; 325 MG/1; MG/1
1 TABLET ORAL EVERY 4 HOURS PRN
Qty: 42 TABLET | Refills: 0 | Status: SHIPPED | OUTPATIENT
Start: 2021-09-16 | End: 2021-09-23 | Stop reason: SDUPTHER

## 2021-09-16 NOTE — TELEPHONE ENCOUNTER
PT NEEDS A REFILL:  *HYDROCODONE 10/325#42  PHARM#[phone] ANGELOFREDIS  MAY PICK-UP Friday MORNING  PT HAS APPT WITH DR AREVALO 11-8-21 @ 3:00

## 2021-09-17 ENCOUNTER — OFFICE VISIT (OUTPATIENT)
Dept: FAMILY MEDICINE CLINIC | Facility: CLINIC | Age: 71
End: 2021-09-17

## 2021-09-17 VITALS
SYSTOLIC BLOOD PRESSURE: 140 MMHG | DIASTOLIC BLOOD PRESSURE: 86 MMHG | BODY MASS INDEX: 29.47 KG/M2 | WEIGHT: 217.6 LBS | HEIGHT: 72 IN | TEMPERATURE: 96.9 F | RESPIRATION RATE: 20 BRPM | OXYGEN SATURATION: 95 % | HEART RATE: 79 BPM

## 2021-09-17 DIAGNOSIS — M25.512 CHRONIC LEFT SHOULDER PAIN: Primary | ICD-10-CM

## 2021-09-17 DIAGNOSIS — Z79.899 HIGH RISK MEDICATION USE: ICD-10-CM

## 2021-09-17 DIAGNOSIS — F17.210 CIGARETTE SMOKER: ICD-10-CM

## 2021-09-17 DIAGNOSIS — G89.29 CHRONIC LEFT SHOULDER PAIN: Primary | ICD-10-CM

## 2021-09-17 PROCEDURE — 99213 OFFICE O/P EST LOW 20 MIN: CPT | Performed by: FAMILY MEDICINE

## 2021-09-17 NOTE — PROGRESS NOTES
HPI  Gallo Leary is a 71 y.o. male who is here for follow up of chronic shoulder pain.  Overall stable on current regimen and is to schedule appointment with pain management.  Again strongly recommended cancer screening including lung and colon cancer.  Other health measures should be reviewed.  Patient declines at this time.  We will continue to monitor closely.  Has lost some weight but has changed diet and is drinking more water and less soft drinks etc..      Review of Systems   Constitutional: Negative for unexpected weight change.   Musculoskeletal:        Chronic shoulder pain   All other systems reviewed and are negative.        History reviewed. No pertinent past medical history.    History reviewed. No pertinent surgical history.    History reviewed. No pertinent family history.    Social History     Socioeconomic History   • Marital status:      Spouse name: Not on file   • Number of children: Not on file   • Years of education: Not on file   • Highest education level: Not on file   Tobacco Use   • Smoking status: Current Every Day Smoker     Packs/day: 0.50     Years: 50.00     Pack years: 25.00   • Smokeless tobacco: Never Used   Substance and Sexual Activity   • Alcohol use: Yes     Comment: very seldom 1 or twice a year.   • Drug use: No   • Sexual activity: Not Currently       Vitals:    09/17/21 1534   BP: 140/86   Pulse: 79   Resp: 20   Temp: 96.9 °F (36.1 °C)   SpO2: 95%        Body mass index is 29.5 kg/m².      Physical Exam  Vitals and nursing note reviewed.   Constitutional:       General: He is not in acute distress.     Appearance: He is well-developed.   HENT:      Head: Normocephalic and atraumatic.   Eyes:      Extraocular Movements: Extraocular movements intact.      Conjunctiva/sclera: Conjunctivae normal.      Pupils: Pupils are equal, round, and reactive to light.   Neck:      Thyroid: No thyromegaly.   Cardiovascular:      Rate and Rhythm: Normal rate and regular  rhythm.      Heart sounds: Normal heart sounds.   Pulmonary:      Effort: Pulmonary effort is normal. No respiratory distress.      Breath sounds: Normal breath sounds.   Abdominal:      General: There is no distension.      Palpations: Abdomen is soft. There is no mass.      Tenderness: There is no abdominal tenderness.      Hernia: No hernia is present.   Musculoskeletal:         General: No tenderness or deformity. Normal range of motion.      Cervical back: Normal range of motion.   Lymphadenopathy:      Cervical: No cervical adenopathy.   Skin:     General: Skin is warm and dry.      Coloration: Skin is not pale.      Findings: No rash.   Neurological:      General: No focal deficit present.      Mental Status: He is alert and oriented to person, place, and time.      Motor: No abnormal muscle tone.      Coordination: Coordination normal.   Psychiatric:         Mood and Affect: Mood normal.         Behavior: Behavior normal.         Thought Content: Thought content normal.         Judgment: Judgment normal.           Assessment/Plan    Diagnoses and all orders for this visit:    1. Chronic left shoulder pain (Primary)    2. High risk medication use    3. Cigarette smoker      Patient here mostly monitoring pain medication until can get appointment with pain management.  Again strongly recommended teen health screening especially with very strong family history of cancers.  This includes parents and several brothers.  Strongly recommend discontinuing cigarette use.  We will continue to follow closely every 3 months.  No evidence of abuse nor misuse of medication which will be continued with close monitoring.    This note includes information entered using a voice recognition dictation system.  Though reviewed, some nonsensible errors may remain.

## 2021-09-23 DIAGNOSIS — M25.512 CHRONIC LEFT SHOULDER PAIN: ICD-10-CM

## 2021-09-23 DIAGNOSIS — G89.29 CHRONIC LEFT SHOULDER PAIN: ICD-10-CM

## 2021-09-23 RX ORDER — HYDROCODONE BITARTRATE AND ACETAMINOPHEN 10; 325 MG/1; MG/1
1 TABLET ORAL EVERY 4 HOURS PRN
Qty: 42 TABLET | Refills: 0 | Status: SHIPPED | OUTPATIENT
Start: 2021-09-23 | End: 2021-09-30 | Stop reason: SDUPTHER

## 2021-09-23 NOTE — TELEPHONE ENCOUNTER
"PT CALLED AND ASKED FOR A REFILL OF HIS MED.     HYDROcodone-acetaminophen (NORCO)  MG per tablet [59529]           Pharmacy:   [facility] #[id number] - [facility] [address] AT [facility] OF [facility] & [facility] - [phone] [facility] [phone] FX        PLEASE NOTE IT MUST SAY \"PT CAN  MEDS ON Friday AM\" PLEASE     THANK YOU  "

## 2021-09-29 ENCOUNTER — IMMUNIZATION (OUTPATIENT)
Dept: VACCINE CLINIC | Facility: HOSPITAL | Age: 71
End: 2021-09-29

## 2021-09-29 PROCEDURE — 0002A: CPT | Performed by: INTERNAL MEDICINE

## 2021-09-29 PROCEDURE — 91300 HC SARSCOV02 VAC 30MCG/0.3ML IM: CPT | Performed by: INTERNAL MEDICINE

## 2021-09-30 DIAGNOSIS — M25.512 CHRONIC LEFT SHOULDER PAIN: ICD-10-CM

## 2021-09-30 DIAGNOSIS — G89.29 CHRONIC LEFT SHOULDER PAIN: ICD-10-CM

## 2021-09-30 RX ORDER — HYDROCODONE BITARTRATE AND ACETAMINOPHEN 10; 325 MG/1; MG/1
1 TABLET ORAL EVERY 4 HOURS PRN
Qty: 42 TABLET | Refills: 0 | Status: SHIPPED | OUTPATIENT
Start: 2021-09-30 | End: 2021-09-30 | Stop reason: SDUPTHER

## 2021-09-30 RX ORDER — HYDROCODONE BITARTRATE AND ACETAMINOPHEN 10; 325 MG/1; MG/1
1 TABLET ORAL EVERY 4 HOURS PRN
Qty: 42 TABLET | Refills: 0 | Status: SHIPPED | OUTPATIENT
Start: 2021-09-30 | End: 2021-10-07 | Stop reason: SDUPTHER

## 2021-09-30 NOTE — TELEPHONE ENCOUNTER
"PT CALLED AND ASKED FOR A REFILL OF HIS MED.     HYDROcodone-acetaminophen (NORCO)  MG per tablet [36113]           Pharmacy:   [facility] #[id number] - [facility] [address] AT [facility] OF [facility] & [facility] - [phone] [facility] [phone] FX        PLEASE NOTE IT MUST SAY \"PT CAN  MEDS ON Friday AM\" PLEASE     THANK YOU  "

## 2021-09-30 NOTE — TELEPHONE ENCOUNTER
Rx Refill Note  Requested Prescriptions      No prescriptions requested or ordered in this encounter      Last office visit with prescribing clinician: 9/17/2021      Next office visit with prescribing clinician: 12/3/2021            Kanwal Carcamo MA  09/30/21, 09:46 EDT

## 2021-10-07 ENCOUNTER — TELEPHONE (OUTPATIENT)
Dept: FAMILY MEDICINE CLINIC | Facility: CLINIC | Age: 71
End: 2021-10-07

## 2021-10-07 DIAGNOSIS — M25.512 CHRONIC LEFT SHOULDER PAIN: ICD-10-CM

## 2021-10-07 DIAGNOSIS — G89.29 CHRONIC LEFT SHOULDER PAIN: ICD-10-CM

## 2021-10-07 RX ORDER — HYDROCODONE BITARTRATE AND ACETAMINOPHEN 10; 325 MG/1; MG/1
1 TABLET ORAL EVERY 4 HOURS PRN
Qty: 42 TABLET | Refills: 0 | Status: SHIPPED | OUTPATIENT
Start: 2021-10-07 | End: 2021-10-14 | Stop reason: SDUPTHER

## 2021-10-07 NOTE — TELEPHONE ENCOUNTER
"PT CALLED AND ASKED FOR A REFILL OF HIS MED.     HYDROcodone-acetaminophen (NORCO)  MG per tablet [97712]           Pharmacy:   [facility] #[id number] - [facility] [address] AT [facility] OF [facility] & [facility] - [phone] [facility] [phone] FX        PLEASE NOTE IT MUST SAY \"PT CAN  MEDS ON Friday AM\" PLEASE     THANK YOU  "

## 2021-10-14 ENCOUNTER — TELEPHONE (OUTPATIENT)
Dept: FAMILY MEDICINE CLINIC | Facility: CLINIC | Age: 71
End: 2021-10-14

## 2021-10-14 DIAGNOSIS — G89.29 CHRONIC LEFT SHOULDER PAIN: ICD-10-CM

## 2021-10-14 DIAGNOSIS — M25.512 CHRONIC LEFT SHOULDER PAIN: ICD-10-CM

## 2021-10-14 RX ORDER — HYDROCODONE BITARTRATE AND ACETAMINOPHEN 10; 325 MG/1; MG/1
1 TABLET ORAL EVERY 4 HOURS PRN
Qty: 42 TABLET | Refills: 0 | Status: SHIPPED | OUTPATIENT
Start: 2021-10-14 | End: 2021-10-21 | Stop reason: SDUPTHER

## 2021-10-14 NOTE — TELEPHONE ENCOUNTER
"Pt is calling for refill of Hydrocodone 10/325- rx must say \"Pt can  meds on Friday AM\"   LOV 9/17/21  "

## 2021-10-21 ENCOUNTER — TELEPHONE (OUTPATIENT)
Dept: FAMILY MEDICINE CLINIC | Facility: CLINIC | Age: 71
End: 2021-10-21

## 2021-10-21 DIAGNOSIS — M25.512 CHRONIC LEFT SHOULDER PAIN: ICD-10-CM

## 2021-10-21 DIAGNOSIS — G89.29 CHRONIC LEFT SHOULDER PAIN: ICD-10-CM

## 2021-10-21 RX ORDER — HYDROCODONE BITARTRATE AND ACETAMINOPHEN 10; 325 MG/1; MG/1
1 TABLET ORAL EVERY 4 HOURS PRN
Qty: 42 TABLET | Refills: 0 | Status: SHIPPED | OUTPATIENT
Start: 2021-10-21 | End: 2021-10-28 | Stop reason: SDUPTHER

## 2021-10-21 NOTE — TELEPHONE ENCOUNTER
"PT CALLED AND ASKED FOR A REFILL OF HIS MED.     HYDROcodone-acetaminophen (NORCO)  MG per tablet [02280]           Pharmacy:   [facility] #[id number] - [facility] [address] AT [facility] OF [facility] & [facility] - [phone] [facility] [phone] FX        PLEASE NOTE IT MUST SAY \"PT CAN  MEDS ON Friday AM\"       "

## 2021-10-28 DIAGNOSIS — M25.512 CHRONIC LEFT SHOULDER PAIN: ICD-10-CM

## 2021-10-28 DIAGNOSIS — G89.29 CHRONIC LEFT SHOULDER PAIN: ICD-10-CM

## 2021-10-28 RX ORDER — HYDROCODONE BITARTRATE AND ACETAMINOPHEN 10; 325 MG/1; MG/1
1 TABLET ORAL EVERY 4 HOURS PRN
Qty: 42 TABLET | Refills: 0 | Status: SHIPPED | OUTPATIENT
Start: 2021-10-28 | End: 2021-11-04 | Stop reason: SDUPTHER

## 2021-10-28 NOTE — TELEPHONE ENCOUNTER
PT NEEDS REFILL ON:  *HYDROCODONE 10/325#42  PLEASE WRITE MAY PICK-UP Friday MORNING  PHARM#[phone] JULIETTE

## 2021-11-04 DIAGNOSIS — G89.29 CHRONIC LEFT SHOULDER PAIN: ICD-10-CM

## 2021-11-04 DIAGNOSIS — M25.512 CHRONIC LEFT SHOULDER PAIN: ICD-10-CM

## 2021-11-04 RX ORDER — HYDROCODONE BITARTRATE AND ACETAMINOPHEN 10; 325 MG/1; MG/1
1 TABLET ORAL EVERY 4 HOURS PRN
Qty: 42 TABLET | Refills: 0 | Status: SHIPPED | OUTPATIENT
Start: 2021-11-04 | End: 2021-11-12 | Stop reason: SDUPTHER

## 2021-11-04 NOTE — TELEPHONE ENCOUNTER
PT NEEDS REFILL ON:  *HYDROCODONE 10/325#42  MAY PICK-UP Friday MORNING  PHARM#[phone] JULIETTE KULKARNI IS SHUT DOWN

## 2021-11-04 NOTE — TELEPHONE ENCOUNTER
Rx Refill Note  Requested Prescriptions     Pending Prescriptions Disp Refills   • HYDROcodone-acetaminophen (NORCO)  MG per tablet 42 tablet 0     Sig: Take 1 tablet by mouth Every 4 (Four) Hours As Needed for Moderate Pain  or Severe Pain . Okay to  prescription Friday.      Last office visit with prescribing clinician: 9/17/2021      Next office visit with prescribing clinician: 12/3/2021            Kanwal Carcamo MA  11/04/21, 10:22 EDT

## 2021-11-11 DIAGNOSIS — M25.512 CHRONIC LEFT SHOULDER PAIN: ICD-10-CM

## 2021-11-11 DIAGNOSIS — G89.29 CHRONIC LEFT SHOULDER PAIN: ICD-10-CM

## 2021-11-12 RX ORDER — HYDROCODONE BITARTRATE AND ACETAMINOPHEN 10; 325 MG/1; MG/1
1 TABLET ORAL EVERY 4 HOURS PRN
Qty: 42 TABLET | Refills: 0 | Status: SHIPPED | OUTPATIENT
Start: 2021-11-12 | End: 2021-11-18 | Stop reason: SDUPTHER

## 2021-11-12 NOTE — TELEPHONE ENCOUNTER
Rx Refill Note  Requested Prescriptions     Pending Prescriptions Disp Refills   • HYDROcodone-acetaminophen (NORCO)  MG per tablet 42 tablet 0     Sig: Take 1 tablet by mouth Every 4 (Four) Hours As Needed for Moderate Pain  or Severe Pain . Okay to  prescription Friday.      Last office visit with prescribing clinician: 9/17/2021      Next office visit with prescribing clinician: 12/3/2021            Kanwal Carcamo MA  11/12/21, 07:31 EST

## 2021-11-18 DIAGNOSIS — G89.29 CHRONIC LEFT SHOULDER PAIN: ICD-10-CM

## 2021-11-18 DIAGNOSIS — M25.512 CHRONIC LEFT SHOULDER PAIN: ICD-10-CM

## 2021-11-18 RX ORDER — HYDROCODONE BITARTRATE AND ACETAMINOPHEN 10; 325 MG/1; MG/1
1 TABLET ORAL EVERY 4 HOURS PRN
Qty: 42 TABLET | Refills: 0 | Status: SHIPPED | OUTPATIENT
Start: 2021-11-18 | End: 2021-11-23 | Stop reason: SDUPTHER

## 2021-11-18 NOTE — TELEPHONE ENCOUNTER
Rx Refill Note  Requested Prescriptions     Pending Prescriptions Disp Refills   • HYDROcodone-acetaminophen (NORCO)  MG per tablet 42 tablet 0     Sig: Take 1 tablet by mouth Every 4 (Four) Hours As Needed for Moderate Pain  or Severe Pain . Okay to  prescription Friday.      Last office visit with prescribing clinician: 9/17/2021      Next office visit with prescribing clinician: 12/3/2021            Kanwal Carcamo MA  11/18/21, 10:16 EST

## 2021-11-23 DIAGNOSIS — M25.512 CHRONIC LEFT SHOULDER PAIN: ICD-10-CM

## 2021-11-23 DIAGNOSIS — G89.29 CHRONIC LEFT SHOULDER PAIN: ICD-10-CM

## 2021-11-23 RX ORDER — HYDROCODONE BITARTRATE AND ACETAMINOPHEN 10; 325 MG/1; MG/1
1 TABLET ORAL EVERY 4 HOURS PRN
Qty: 42 TABLET | Refills: 0 | Status: SHIPPED | OUTPATIENT
Start: 2021-11-23 | End: 2021-12-02 | Stop reason: SDUPTHER

## 2021-11-23 NOTE — TELEPHONE ENCOUNTER
Rx Refill Note  Requested Prescriptions     Pending Prescriptions Disp Refills   • HYDROcodone-acetaminophen (NORCO)  MG per tablet 42 tablet 0     Sig: Take 1 tablet by mouth Every 4 (Four) Hours As Needed for Moderate Pain  or Severe Pain . Okay to  prescription Friday.      Last office visit with prescribing clinician: 9/17/2021      Next office visit with prescribing clinician: 12/3/2021            Kanwal Carcamo MA  11/23/21, 10:17 EST

## 2021-11-23 NOTE — TELEPHONE ENCOUNTER
PT NEEDS A REFILL ON:  *HYDROCODONE 10/325#42  PHARM#[phone] JULIETTE  MAY PICK-UP ON Friday  PT IS CALLING TODAY DUE TO THE HOLIDAY

## 2021-12-02 DIAGNOSIS — M25.512 CHRONIC LEFT SHOULDER PAIN: ICD-10-CM

## 2021-12-02 DIAGNOSIS — G89.29 CHRONIC LEFT SHOULDER PAIN: ICD-10-CM

## 2021-12-02 RX ORDER — HYDROCODONE BITARTRATE AND ACETAMINOPHEN 10; 325 MG/1; MG/1
1 TABLET ORAL EVERY 4 HOURS PRN
Qty: 42 TABLET | Refills: 0 | Status: SHIPPED | OUTPATIENT
Start: 2021-12-02 | End: 2021-12-09 | Stop reason: SDUPTHER

## 2021-12-02 NOTE — TELEPHONE ENCOUNTER
Rx Refill Note  Requested Prescriptions     Pending Prescriptions Disp Refills   • HYDROcodone-acetaminophen (NORCO)  MG per tablet 42 tablet 0     Sig: Take 1 tablet by mouth Every 4 (Four) Hours As Needed for Moderate Pain  or Severe Pain . Okay to  prescription Friday.      Last office visit with prescribing clinician: 9/17/2021      Next office visit with prescribing clinician: 12/3/2021            Kanwal Carcamo MA  12/02/21, 10:10 EST

## 2021-12-03 ENCOUNTER — OFFICE VISIT (OUTPATIENT)
Dept: FAMILY MEDICINE CLINIC | Facility: CLINIC | Age: 71
End: 2021-12-03

## 2021-12-03 VITALS
WEIGHT: 219.6 LBS | HEIGHT: 72 IN | SYSTOLIC BLOOD PRESSURE: 122 MMHG | OXYGEN SATURATION: 97 % | RESPIRATION RATE: 18 BRPM | BODY MASS INDEX: 29.74 KG/M2 | DIASTOLIC BLOOD PRESSURE: 80 MMHG | TEMPERATURE: 96.9 F | HEART RATE: 69 BPM

## 2021-12-03 DIAGNOSIS — Z79.899 HIGH RISK MEDICATION USE: ICD-10-CM

## 2021-12-03 DIAGNOSIS — M25.512 CHRONIC LEFT SHOULDER PAIN: Primary | ICD-10-CM

## 2021-12-03 DIAGNOSIS — G89.29 CHRONIC LEFT SHOULDER PAIN: Primary | ICD-10-CM

## 2021-12-03 DIAGNOSIS — F17.210 CIGARETTE SMOKER: ICD-10-CM

## 2021-12-03 DIAGNOSIS — J30.9 ALLERGIC RHINITIS, UNSPECIFIED SEASONALITY, UNSPECIFIED TRIGGER: ICD-10-CM

## 2021-12-03 PROCEDURE — 99213 OFFICE O/P EST LOW 20 MIN: CPT | Performed by: FAMILY MEDICINE

## 2021-12-03 RX ORDER — CETIRIZINE HYDROCHLORIDE 10 MG/1
10 TABLET ORAL DAILY
Qty: 6 TABLET | Refills: 0 | COMMUNITY
Start: 2021-12-03

## 2021-12-03 NOTE — PROGRESS NOTES
HPI  Gallo Leary is a 71 y.o. male who is here for follow up of chronic shoulder pain.  Also reports worsening of allergy symptoms and discussed treatment options.  Given samples of Zyrtec and also discussed Flonase.  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  Unfortunately continues to smoke which is strongly discouraged.  Again other health maintenance issues need to be addressed including: And lung cancer screening.  Hopefully this can be addressed at next visit.      Review of Systems   Musculoskeletal: Positive for arthralgias.   Allergic/Immunologic: Positive for environmental allergies.   All other systems reviewed and are negative.        No past medical history on file.    No past surgical history on file.    No family history on file.    Social History     Socioeconomic History   • Marital status:    Tobacco Use   • Smoking status: Current Every Day Smoker     Packs/day: 0.50     Years: 50.00     Pack years: 25.00   • Smokeless tobacco: Never Used   Substance and Sexual Activity   • Alcohol use: Yes     Comment: very seldom 1 or twice a year.   • Drug use: No   • Sexual activity: Not Currently       Vitals:    12/03/21 1555   BP: 122/80   Pulse: 69   Resp: 18   Temp: 96.9 °F (36.1 °C)   SpO2: 97%        Body mass index is 29.77 kg/m².      Physical Exam  Vitals and nursing note reviewed.   Constitutional:       General: He is not in acute distress.     Appearance: He is well-developed.   HENT:      Head: Normocephalic and atraumatic.      Nose:      Comments: Patient with mask.  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      Pupils: Pupils are equal, round, and reactive to light.   Neck:      Thyroid: No thyromegaly.   Cardiovascular:      Rate and Rhythm: Normal rate and regular rhythm.      Heart sounds: Normal heart sounds.   Pulmonary:      Effort: Pulmonary effort is normal. No respiratory distress.       Breath sounds: Normal breath sounds.   Abdominal:      General: There is no distension.      Palpations: Abdomen is soft. There is no mass.      Tenderness: There is no abdominal tenderness.      Hernia: No hernia is present.   Musculoskeletal:         General: No tenderness or deformity. Normal range of motion.      Cervical back: Normal range of motion.   Lymphadenopathy:      Cervical: No cervical adenopathy.   Skin:     General: Skin is warm and dry.      Coloration: Skin is not pale.      Findings: No rash.   Neurological:      General: No focal deficit present.      Mental Status: He is alert and oriented to person, place, and time.      Motor: No abnormal muscle tone.      Coordination: Coordination normal.   Psychiatric:         Mood and Affect: Mood normal.         Behavior: Behavior normal.         Thought Content: Thought content normal.         Judgment: Judgment normal.           Assessment/Plan    Diagnoses and all orders for this visit:    1. Chronic left shoulder pain (Primary)    2. High risk medication use    3. Allergic rhinitis, unspecified seasonality, unspecified trigger      Patient here for routine follow-up especially of chronic pain medication for shoulder pain.  Patient continues to work and seems stable on current regimen.  Discussed treatment for allergies including antihistamine therapy and samples given of Zyrtec.  Also consider Flonase and other therapy options.  Again strongly recommend discontinuing cigarette use.  Also need to address cancer screening recommendations.  As I recall there is a strong family history of cancers.

## 2021-12-09 ENCOUNTER — TELEPHONE (OUTPATIENT)
Dept: FAMILY MEDICINE CLINIC | Facility: CLINIC | Age: 71
End: 2021-12-09

## 2021-12-09 DIAGNOSIS — M25.512 CHRONIC LEFT SHOULDER PAIN: ICD-10-CM

## 2021-12-09 DIAGNOSIS — G89.29 CHRONIC LEFT SHOULDER PAIN: ICD-10-CM

## 2021-12-09 RX ORDER — HYDROCODONE BITARTRATE AND ACETAMINOPHEN 10; 325 MG/1; MG/1
1 TABLET ORAL EVERY 4 HOURS PRN
Qty: 42 TABLET | Refills: 0 | Status: SHIPPED | OUTPATIENT
Start: 2021-12-09 | End: 2021-12-16 | Stop reason: SDUPTHER

## 2021-12-09 NOTE — TELEPHONE ENCOUNTER
PT NEEDS REFILL ON:  *HYDROCODONE 10/325#42  MAY PICK-UP Friday MORNING  PHARM#[phone] JULIETTE

## 2021-12-09 NOTE — TELEPHONE ENCOUNTER
Rx Refill Note  Requested Prescriptions      No prescriptions requested or ordered in this encounter      Last office visit with prescribing clinician: 12/3/2021      Next office visit with prescribing clinician: 3/4/2022            Kanwal Carcamo MA  12/09/21, 09:46 EST

## 2021-12-16 DIAGNOSIS — G89.29 CHRONIC LEFT SHOULDER PAIN: ICD-10-CM

## 2021-12-16 DIAGNOSIS — M25.512 CHRONIC LEFT SHOULDER PAIN: ICD-10-CM

## 2021-12-16 RX ORDER — HYDROCODONE BITARTRATE AND ACETAMINOPHEN 10; 325 MG/1; MG/1
1 TABLET ORAL EVERY 4 HOURS PRN
Qty: 42 TABLET | Refills: 0 | Status: SHIPPED | OUTPATIENT
Start: 2021-12-16 | End: 2021-12-23 | Stop reason: SDUPTHER

## 2021-12-16 NOTE — TELEPHONE ENCOUNTER
Rx Refill Note  Requested Prescriptions      No prescriptions requested or ordered in this encounter      Last office visit with prescribing clinician: 12/3/2021      Next office visit with prescribing clinician: 3/4/2022            Kanwal Carcamo MA  12/16/21, 10:10 EST

## 2021-12-22 ENCOUNTER — TELEPHONE (OUTPATIENT)
Dept: FAMILY MEDICINE CLINIC | Facility: CLINIC | Age: 71
End: 2021-12-22

## 2021-12-23 ENCOUNTER — TELEPHONE (OUTPATIENT)
Dept: FAMILY MEDICINE CLINIC | Facility: CLINIC | Age: 71
End: 2021-12-23

## 2021-12-23 DIAGNOSIS — G89.29 CHRONIC LEFT SHOULDER PAIN: ICD-10-CM

## 2021-12-23 DIAGNOSIS — M25.512 CHRONIC LEFT SHOULDER PAIN: ICD-10-CM

## 2021-12-23 RX ORDER — HYDROCODONE BITARTRATE AND ACETAMINOPHEN 10; 325 MG/1; MG/1
1 TABLET ORAL EVERY 4 HOURS PRN
Qty: 42 TABLET | Refills: 0 | Status: SHIPPED | OUTPATIENT
Start: 2021-12-23 | End: 2021-12-30 | Stop reason: SDUPTHER

## 2021-12-30 ENCOUNTER — TELEPHONE (OUTPATIENT)
Dept: FAMILY MEDICINE CLINIC | Facility: CLINIC | Age: 71
End: 2021-12-30

## 2021-12-30 DIAGNOSIS — G89.29 CHRONIC LEFT SHOULDER PAIN: ICD-10-CM

## 2021-12-30 DIAGNOSIS — M25.512 CHRONIC LEFT SHOULDER PAIN: ICD-10-CM

## 2021-12-30 RX ORDER — HYDROCODONE BITARTRATE AND ACETAMINOPHEN 10; 325 MG/1; MG/1
1 TABLET ORAL EVERY 4 HOURS PRN
Qty: 42 TABLET | Refills: 0 | Status: SHIPPED | OUTPATIENT
Start: 2021-12-30 | End: 2022-01-05 | Stop reason: SDUPTHER

## 2021-12-30 NOTE — TELEPHONE ENCOUNTER
Rx Refill Note  Requested Prescriptions     Pending Prescriptions Disp Refills   • HYDROcodone-acetaminophen (NORCO)  MG per tablet 42 tablet 0     Sig: Take 1 tablet by mouth Every 4 (Four) Hours As Needed for Moderate Pain  or Severe Pain . Okay to  prescription Friday.      Last office visit with prescribing clinician: 12/3/2021      Next office visit with prescribing clinician: 3/4/2022            Riana Russo MA  12/30/21, 09:47 EST

## 2022-01-05 ENCOUNTER — OFFICE VISIT (OUTPATIENT)
Dept: PAIN MEDICINE | Facility: CLINIC | Age: 72
End: 2022-01-05

## 2022-01-05 VITALS
BODY MASS INDEX: 29.77 KG/M2 | RESPIRATION RATE: 18 BRPM | OXYGEN SATURATION: 95 % | SYSTOLIC BLOOD PRESSURE: 153 MMHG | DIASTOLIC BLOOD PRESSURE: 89 MMHG | HEART RATE: 84 BPM | TEMPERATURE: 96.8 F | WEIGHT: 219.8 LBS | HEIGHT: 72 IN

## 2022-01-05 DIAGNOSIS — G89.29 CHRONIC LEFT SHOULDER PAIN: ICD-10-CM

## 2022-01-05 DIAGNOSIS — M25.512 CHRONIC LEFT SHOULDER PAIN: ICD-10-CM

## 2022-01-05 DIAGNOSIS — G89.4 CHRONIC PAIN SYNDROME: Primary | ICD-10-CM

## 2022-01-05 DIAGNOSIS — Z79.899 HIGH RISK MEDICATION USE: ICD-10-CM

## 2022-01-05 LAB
POC AMPHETAMINES: NEGATIVE
POC BARBITURATES: NEGATIVE
POC BENZODIAZEPHINES: NEGATIVE
POC COCAINE: NEGATIVE
POC METHADONE: NEGATIVE
POC METHAMPHETAMINE SCREEN URINE: NEGATIVE
POC OPIATES: POSITIVE
POC OXYCODONE: NEGATIVE
POC PHENCYCLIDINE: NEGATIVE
POC PROPOXYPHENE: NEGATIVE
POC THC: NEGATIVE
POC TRICYCLIC ANTIDEPRESSANTS: NEGATIVE

## 2022-01-05 PROCEDURE — 99204 OFFICE O/P NEW MOD 45 MIN: CPT | Performed by: ANESTHESIOLOGY

## 2022-01-05 PROCEDURE — 80305 DRUG TEST PRSMV DIR OPT OBS: CPT | Performed by: ANESTHESIOLOGY

## 2022-01-05 RX ORDER — HYDROCODONE BITARTRATE AND ACETAMINOPHEN 10; 325 MG/1; MG/1
1 TABLET ORAL EVERY 4 HOURS PRN
Qty: 180 TABLET | Refills: 0 | Status: SHIPPED | OUTPATIENT
Start: 2022-01-05 | End: 2022-02-03 | Stop reason: SDUPTHER

## 2022-01-05 NOTE — PROGRESS NOTES
CHIEF COMPLAINT  Mr. Leary has had left shoulder pain for the past few years with no known cause. He has tried PT but states that it made the pain worse.    Subjective   Gallo Leary is a 71 y.o. male.   He presents to the office for evaluation of continuation of medication management for left shoulder pain. He was referred here by Dr. Dominguez.     In reflection of the patient's noted chief complaint above, there was not an known injury or acute inciting event that he does have shoulder pathology.  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    Masking and social distancing was maintained throughout as well as proper adherence to use of PPE.      Shoulder Injury   The left shoulder is affected. The injury mechanism was repetitive motion. The quality of the pain is described as aching and stabbing. The pain does not radiate. The pain is moderate. Pertinent negatives include no chest pain or numbness. The symptoms are aggravated by movement and overhead lifting. He has tried acetaminophen, elevation, heat, ice, NSAIDs and rest for the symptoms. The treatment provided moderate relief.        PEG Assessment   What number best describes your pain on average in the past week?4  What number best describes how, during the past week, pain has interfered with your enjoyment of life?1  What number best describes how, during the past week, pain has interfered with your general activity?  1    --  The aforementioned information the Chief Complaint section and above subjective data including any HPI data, and also the Review of Systems data, has been personally reviewed and affirmed.  --        Current Outpatient Medications:   •  cetirizine (zyrTEC) 10 MG tablet, Take 1 tablet by mouth Daily., Disp: 6 tablet, Rfl: 0  •  HYDROcodone-acetaminophen (NORCO)  MG per tablet, Take 1 tablet by mouth Every 4 (Four) Hours As Needed  for Severe Pain . Okay to  prescription Friday., Disp: 180 tablet, Rfl: 0  •  Diclofenac Sodium (VOLTAREN) 1 % gel gel, Apply 4 g topically to the appropriate area as directed 2 (Two) Times a Day., Disp: 300 g, Rfl: 0    The following portions of the patient's history were reviewed and updated as appropriate: allergies, current medications, past family history, past medical history, past social history, past surgical history and problem list.    -------    The following portions of the patient's history were reviewed and updated as appropriate: allergies, current medications, past family history, past medical history, past social history, past surgical history and problem list.    No Known Allergies    Current Outpatient Medications on File Prior to Visit   Medication Sig Dispense Refill   • cetirizine (zyrTEC) 10 MG tablet Take 1 tablet by mouth Daily. 6 tablet 0     No current facility-administered medications on file prior to visit.       Patient Active Problem List   Diagnosis   • Cigarette smoker   • Chronic left shoulder pain   • High risk medication use       Past Medical History:   Diagnosis Date   • No known health problems        Past Surgical History:   Procedure Laterality Date   • NO PAST SURGERIES         Family History   Problem Relation Age of Onset   • Cancer Mother        Social History     Socioeconomic History   • Marital status:    Tobacco Use   • Smoking status: Current Every Day Smoker     Packs/day: 0.50     Years: 50.00     Pack years: 25.00   • Smokeless tobacco: Never Used   Substance and Sexual Activity   • Alcohol use: Yes     Comment: very seldom 1 or twice a year.   • Drug use: No   • Sexual activity: Not Currently       -------        REVIEW OF PERTINENT MEDICAL DATA    E-grady report is reviewed:  I reviewed the document in the electronic form under the PDMP tab in the Epic EMR...  - In this function, the report is a current report in as close to real-time as  possible.  - The report was available for immediate review.    - I did lucrecia the report as reviewed.  - There is not concern for aberrant behavior based on this ekasper review.\    -------    Opioid Risk Tool for Male Patients    This tool should be administered to patients upon an initial visit prior to beginning opioid therapy for pain management.  The ORT has been validated for both male and female patients with chronic pain, and there are specific validated tools for each.      Fam Hx of Substance Abuse:  Alcohol=3, Illegal Drugs=3, Rx Drugs=4   TOTAL: 0  Personal History of Substance Abuse:  Alcohol=3, Illegal Drugs=4, Rx Drugs=5 TOTAL: 0  Age Between 16 - 45 years:  yes=1        TOTAL: 0  History of Preadolescent Sexual Abuse:  yes = N/a in ORT for males    TOTAL: 0  Psychological Disease:  ADD/OCD/Schizophrenia/Bipolar = 2 ; Depression=1  TOTAL: 0    SCORING TOTALS:          SUM of TOTALS: 0    Brothers smoked weed many years ago.  No personal history.    Interpretation:  - score of 3 or lower indicates low risk for future opioid abuse  - score of 4 to 7 indicates moderate risk for opioid abuse  - score of 8 or higher indicates a high risk for opioid abuse.  - this assessment alone is not the only determining factor in developing a treatment plan    Citation... Dr. Philomena Conti, Pain Med. 2005; 6 (6) : 432.  -------      -------    Screener & Opioid Assessment for Patients with Pain (SOAPP yasmin 1.0-SF)    A questionnaire for patients being considered for chronic opioid therapy.  Patient asked to answer each question as honestly as possible.  Confidential results.     1) How often do you have mood swings?       1 = Seldom  2) How often do you smoke a cigarette within an hour after you wake up?   1 = Seldom  3) How often have you taken medication other than the way that it was prescribed?  0 = Never  4) How often have you used illegal drugs (I.e. marijuana,cocaine,etc.) in the past 5 years? 0 = Never  5) How  often, in your lifetime, have you had legal problems or been arrested?   0 = Never    Any additional information you wish to share about the above answers?      No     SCORE = sum of the ratings from questions 1 through 5....      2    A score equal to or greater than 4 is considered positive.    - at a cutoff of 4, NPV is 0.85 & PPV is 0.69  - This assessment alone is not the only determining factor in deciding on a treatment plan.      -------      Debbie LOPEZI.RAzulEAzul Score: Patient Selection for Chronic Opioid Analgesia   For each factor, rate the patient’s score from 1-3 based on the explanations in the right hand column.       Diagnosis:    2 1 = Benign chronic condition with minimal objective findings or no definite medical diagnosis. Examples: fibromyalgia, migraine headaches, nonspecific back pain.   2 = Slowly progressive condition concordant with moderate pain, or fixed condition with moderate objective findings. Examples: failed back surgery syndrome, back pain with moderate degenerative changes, neuropathic pain.   3 = Advanced condition concordant with severe pain with objective findings. Examples: severe ischemic vascular disease, advanced neuropathy, severe spinal stenosis.    Intractability:    2 1 = Few therapies have been tried and the patient takes a passive role in his/her pain management process.   2 = Most customary treatments have been tried but the patient is not fully engaged in the pain management process, or barriers prevent (insurance, transportation, medical illness).   3 = Patient fully engaged in a spectrum of appropriate treatments but with inadequate response.    Risk:  (R = Total of P + C + R + S below)    Psychological:     3 1 = Serious personality dysfunction or mental illness interfering with care.   Example: personality disorder, severe affective disorder, significant personality issues.   2 = Personality or mental health interferes moderately. Example: depression or anxiety  disorder.   3 = Good communication with clinic. No significant personality dysfunction or mental illness.    Chemical Health:     3 1 = Active or very recent use of illicit drugs, excessive alcohol, or prescription drug abuse.   2 = Chemical coper (uses medications to cope with stress) or history of CD in remission.   3 = No CD history. Not drug-focused or chemically reliant.      Reliability:     3 1 = History of numerous problems: medication misuse, missed appointments, rarely follows through.   2 = Occasional difficulties with compliance, but generally reliable.   3 = Highly reliable patient with meds, appointments & treatment.    Social Support:     2 1 = Life in chaos. Little family support and few close relationships. Loss of most normal life roles.   2 = Reduction in some relationships and life roles.   3 = Supportive family/close relationships. Involved in work or school and no social isolation.    Efficacy score:    3 1 = Poor function or minimal pain relief despite moderate to high doses.   2 = Moderate benefit with function improved in a number of ways (or insufficient info - hasn’t tried opioid yet or very low doses or too short of a trial).   3 = Good improvement in pain and function and quality of life with stable doses over time.           18  Total score = D + I + R + E     Score 7-13: Not a suitable candidate for long-term opioid analgesia   Score 14-21: May be a candidate for long-term opioid analgesia     (Source: [facility] © 2005.)      ---    Review of the last office note from the referring physician, his primary care physician Dr. Mooney.  He counseled the patient on smoking cessation.  Stability of chronic pain medication regimen and success of such is noted.  Date of this visit was December 3, 2021.    I reviewed MRI report of his left shoulder and this was from October 12, 2019.  There was partial tear of subscapularis muscle tendon and also  "supraspinatus tendinopathy.  There was some moderate AC joint degenerative changes.    -------    Prelim UDS Immunoassay Today:    AMP (amphet) negative  BAR (barbituate) negative  BUP (buprenorph) negative  BZO (benzodiaz) negative  ALEX (cocaine) negative  MET (methamph) negative  MDMA (ecstacy) negative  MTD (methadone) negative  OPI (opiate) positive  PCP (pcp)  negative  OXY (oxycodone) negative  THC  (marijuana)  negative    GreenTemp warm  Appearance WNL    -------          Review of Systems   Constitutional: Negative for fatigue.   HENT: Positive for congestion.    Eyes: Negative for visual disturbance.   Respiratory: Negative for shortness of breath.    Cardiovascular: Negative for chest pain.   Gastrointestinal: Positive for constipation.   Genitourinary: Negative for difficulty urinating.   Musculoskeletal: Positive for arthralgias (left shoulder).   Neurological: Negative for weakness and numbness.   Psychiatric/Behavioral: Negative for sleep disturbance and suicidal ideas. The patient is not nervous/anxious.          Vitals:    01/05/22 1248   BP: 153/89   Pulse: 84   Resp: 18   Temp: 96.8 °F (36 °C)   SpO2: 95%   Weight: 99.7 kg (219 lb 12.8 oz)   Height: 182.9 cm (72.01\")   PainSc:   5   PainLoc: Shoulder         Objective   Physical Exam  Vitals and nursing note reviewed.   Constitutional:       General: He is not in acute distress.     Appearance: Normal appearance. He is well-developed. He is not toxic-appearing.   HENT:      Head: Normocephalic and atraumatic.      Right Ear: Hearing and external ear normal.      Left Ear: Hearing and external ear normal.      Nose: Nose normal.   Eyes:      General: Lids are normal.      Conjunctiva/sclera: Conjunctivae normal.      Pupils: Pupils are equal, round, and reactive to light.   Pulmonary:      Effort: Pulmonary effort is normal. No respiratory distress.   Musculoskeletal:      Right shoulder: Normal.      Left shoulder: Bony tenderness (ac area) and " crepitus present. No swelling. Decreased range of motion. Normal pulse.      Comments: He has problems with abduction and forward rotation and overhead extension.  Pérez positive.   Neurological:      Mental Status: He is alert and oriented to person, place, and time.      Cranial Nerves: Cranial nerves are intact. No cranial nerve deficit.      Sensory: Sensation is intact.      Motor: Motor function is intact.      Coordination: Coordination is intact.      Gait: Gait is intact.      Deep Tendon Reflexes:      Reflex Scores:       Tricep reflexes are 1+ on the right side and 1+ on the left side.       Bicep reflexes are 1+ on the right side and 1+ on the left side.       Brachioradialis reflexes are 1+ on the right side and 1+ on the left side.       Patellar reflexes are 1+ on the right side and 1+ on the left side.  Psychiatric:         Behavior: Behavior normal.         Assessment/Plan   Diagnoses and all orders for this visit:    1. Chronic pain syndrome (Primary)  -     Urine Drug Screen Confirmation - Urine, Clean Catch; Future  -     POC Urine Drug Screen, Triage    2. Chronic left shoulder pain  -     HYDROcodone-acetaminophen (NORCO)  MG per tablet; Take 1 tablet by mouth Every 4 (Four) Hours As Needed for Severe Pain . Okay to  prescription Friday.  Dispense: 180 tablet; Refill: 0  -     Urine Drug Screen Confirmation - Urine, Clean Catch; Future  -     POC Urine Drug Screen, Triage    3. High risk medication use  -     Urine Drug Screen Confirmation - Urine, Clean Catch; Future  -     POC Urine Drug Screen, Triage    Other orders  -     Diclofenac Sodium (VOLTAREN) 1 % gel gel; Apply 4 g topically to the appropriate area as directed 2 (Two) Times a Day.  Dispense: 300 g; Refill: 0        So in summary this 71-year-old gentleman has had moderate success with use of opiate therapy which is allowed him to work and not miss work.  Review of multiple screening tools as above.  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  I do not see any contraindications at this time due to continuation of the therapy.  Due diligence being completed including obtaining a urine drug screen and will have him follow back in a month per [place] statutes regarding initial prescribing of the medication, as it is the first prescription that I have prescribed.    We talked about the fact that he does take this medication in a range which is considered high range opiate therapy.  While he seems to be at low risk for misuse of medication it is an overall moderate risk assessment because of the higher range of the drug and he will need to be seen every couple of months at a minimum going forward.  We also talked about the potential for transferring the monitoring and maintenance to primary care physician in the future which could be appropriate.    --- Follow-up 1 month    A medication management agreement is signed by the patient and the provider today.  Reviewed with the patient that this contract is specific to controlled substances, specifically pain medication.  Reviewed core of pain medicine is to decrease pain and increase functional level.  Reviewed the medication must be picked up as a written prescription from this office and will not be called into any pharmacy.  Reviewed the use of Jeronimo within the office setting.  Reviewed causes for potential of discontinuation of opiates,  including but not limited to consideration for diversion, obtaining other controlled substances from other license practitioners, or  inappropriate office behavior.  Patient understands to use a controlled substance as prescribed by physician and to avoid improper use of controlled substances.  Patient will also verbalized understanding that prescriptions to be filled at the same pharmacy  unless office staff is made aware of this.  Patient understands they can be submitted to random urine drug  screens and/or random pill counts on any request.  Patient understands they are not to receive early refills.  The patient must produce an official police report for any effort to replace controlled substances that are lost or stolen.  No use of illegal street drugs while receiving controlled substances from this prescribing provider.  The patient is to take medication as prescribed  and not deviate from the normal schedule without consultation from the provider.  Patient is not to share the medication with others or to take medication with alcohol or other sedatives.  Use caution when driving or operating machinery.  Alert office if the patient becomes pregnant or begins nursing a child.  Reviewed the use of controlled substances recreates a risk for respiratory depression, which may result in serious harm or even death.  Must always keep the prescription in the original container.  Patient is to store controlled substances in a locked cabinet or other secure storage unit that is cool, dry and out of sunlight.  Patient must immediately notify office if any controlled substances is stolen or improperly taken by another individual.  Reviewed risk for physical dependence, tolerance and addiction.    Patient appears stable with current regimen. No adverse effects. Regarding continuation of opioids, there is no evidence of aberrant behavior or any red flags.  The patient continues with appropriate response to opioid therapy. ADL's remain intact by self.     he does have a significant history of shoulder pain and arthropathy and tendinopathy, and demonstrates moderate improvement with multimodal therapy including opioid therapy, which allows him to engage in ADLs and family activities. The continuation of opioid therapy is therefore not contraindicated. We will however respect the March 2016 CDC Guidelines and will plan to avoid overexposure to opioids and avoid dose escalation.             ZION VILLALOBOS    As part of  the patient's treatment plan, I am prescribing controlled substances. The patient has been made aware of appropriate use of such medications, including potential risk of somnolence, limited ability to drive and/or work safely, and the potential for dependence or overdose. It has also bee made clear that these medications are for use by this patient only, without concomitant use of alcohol or other substances unless prescribed.     Patient has completed prescribing agreement detailing terms of continued prescribing of controlled substances, including monitoring ZION reports, urine drug screening, and pill counts if necessary. The patient is aware that inappropriate use will results in cessation of prescribing such medications.    ZION report has been reviewed and scanned into the patient's chart.    As the clinician, I personally reviewed the ZION from as above while the patient was in the office today.    History and physical exam exhibit continued safe and appropriate use of controlled substances.     ---     Dictated utilizing Dragon dictation.     This document is intended for medical expert use only. Reading of this document by patients and/or patient's family without participating medical staff guidance may result in misinterpretation and unintended morbidity.   Any interpretation of such data is the responsibility of the patient and/or family member responsible for the patient in concert with their primary or specialist providers, not to be left for sources of online searches such as [employer], Better Place or similar queries. Relying on these approaches to knowledge may result in misinterpretation, misguided goals of care and even death should patients or family members try recommendations outside of the realm of professional medical care in a supervised way.    ----    Vitals:    01/05/22 1248   PainSc:   5   PainLoc: Shoulder          Gallo GUEVARA Stribbling reports a pain score of 5.  Given his pain assessment as  noted, treatment options were discussed and the following options were decided upon as a follow-up plan to address the patient's pain: continuation of current treatment plan for pain, educational materials on pain management, prescription for non-opiod analgesics and prescription for opiod analgesics.

## 2022-01-05 NOTE — PATIENT INSTRUCTIONS
Diclofenac Sodium Topical Solution  What is this medicine?  DICLOFENAC (dye KLLC fen ak) is a non-steroidal anti-inflammatory drug, also known as an NSAID. It is used to treat pain, inflammation, and swelling.  This medicine may be used for other purposes; ask your health care provider or pharmacist if you have questions.  COMMON BRAND NAME(S): DICLOVIX, INFLAMMA-K, PENNSAID, VOPAC MDS  What should I tell my health care provider before I take this medicine?  They need to know if you have any of these conditions:  · bleeding disorders  · coronary artery bypass graft (CABG) within the past 2 weeks  · if you often drink alcohol  · heart attack  · heart disease  · heart failure  · high blood pressure  · high levels of potassium in the blood  · kidney disease  · large area of burned or damaged skin  · liver disease  · low red blood cell counts  · lung or breathing disease (asthma)  · receiving steroids like dexamethasone or prednisone  · smoke cigarettes  · skin conditions or sensitivity  · stomach bleeding  · stomach or intestine problems  · take drugs that treat or prevent blood clots  · an unusual or allergic reaction to diclofenac, other medicines, foods, dyes, or preservatives  · pregnant or trying to get pregnant  · breast-feeding  How should I use this medicine?  This drug is for external use only. Do not take by mouth. Wash your hands before and after use. Do not get it in your eyes. If you do, rinse your eyes with plenty of cool tap water. Use it as directed on the prescription label at the same time every day. Do not use it more often than directed. Keep taking it unless your health care provider tells you to stop.  A special MedGuide will be given to you by the pharmacist with each prescription and refill. Be sure to read this information carefully each time.  Talk to your health care provider about the use of this drug in children. Special care may be needed.  Overdosage: If you think you have taken too much  of this medicine contact a poison control center or emergency room at once.  NOTE: This medicine is only for you. Do not share this medicine with others.  What if I miss a dose?  If you use this drug 2 times per day: If you miss a dose, use it as soon as you can. If it is almost time for your next dose, use only that dose. Do not use double or extra doses.  If you use this drug 4 times per day: If you miss a dose, skip it. Use your next dose at the normal time. Do not use extra or 2 doses at the same time to make up for the missed dose.  What may interact with this medicine?  Do not take this medicine with any of the following medications:  · cidofovir  · ketorolac  · methotrexate  This medicine may also interact with the following medications:  · aspirin  · cyclosporine  · lithium  · medicines for blood pressure  · medicines that treat or prevent blood clots like warfarin, enoxaparin, and dalteparin  · NSAIDs, medicines for pain and inflammation, like ibuprofen or naproxen  · other products used on the skin  · steroid medicines like prednisone or cortisone  This list may not describe all possible interactions. Give your health care provider a list of all the medicines, herbs, non-prescription drugs, or dietary supplements you use. Also tell them if you smoke, drink alcohol, or use illegal drugs. Some items may interact with your medicine.  What should I watch for while using this medicine?  Visit your health care provider for regular checks on your progress. Tell your health care provider if your symptoms do not start to get better or if they get worse.  Do not take other medicines that contain aspirin, ibuprofen, or naproxen with this medicine. Side effects such as stomach upset, nausea, or ulcers may be more likely to occur. Many non-prescription medicines contain aspirin, ibuprofen, or naproxen. Always read labels carefully.  This medicine can cause serious ulcers and bleeding in the stomach. It can happen with  no warning. Smoking, drinking alcohol, older age, and poor health can also increase risks. Call your health care provider right away if you have stomach pain or blood in your vomit or stool.  This medicine does not prevent a heart attack or stroke. This medicine may increase the chance of a heart attack or stroke. The chance may increase the longer you use this medicine or if you have heart disease. If you take aspirin to prevent a heart attack or stroke, talk to your health care provider about using this medicine.  Alcohol may interfere with the effect of this medicine. Avoid alcoholic drinks.  This medicine may cause serious skin reactions. They can happen weeks to months after starting the medicine. Contact your health care provider right away if you notice fevers or flu-like symptoms with a rash. The rash may be red or purple and then turn into blisters or peeling of the skin. Or, you might notice a red rash with swelling of the face, lips or lymph nodes in your neck or under your arms.  Talk to your health care provider if you are pregnant before taking this medicine. Taking this medicine between weeks 20 and 30 of pregnancy may harm your unborn baby. Your health care provider will monitor you closely if you need to take it. After 30 weeks of pregnancy, do not take this medicine.  You may get drowsy or dizzy. Do not drive, use machinery, or do anything that needs mental alertness until you know how this medicine affects you. Do not stand up or sit up quickly, especially if you are an older patient. This reduces the risk of dizzy or fainting spells.  Be careful brushing or flossing your teeth or using a toothpick because you may get an infection or bleed more easily. If you have any dental work done, tell your dentist you are receiving this medicine.  This medicine may make it more difficult to get pregnant. Talk to your health care provider if you are concerned about your fertility.  What side effects may I  notice from receiving this medicine?  Side effects that you should report to your doctor or health care provider as soon as possible:  · allergic reactions (skin rash, itching or hives; swelling of the face, lips, or tongue)  · bleeding (bloody or black, tarry stools; red or dark brown urine; spitting up blood or brown material that looks like coffee grounds; red spots on the skin; unusual bruising or bleeding from the eyes, gums, or nose)  · blood clot (chest pain; shortness of breath; pain, swelling, or warmth in the leg)  · blurred vision OR changes in vision  · fast, irregular heartbeat  · heart failure (trouble breathing; fast, irregular heartbeat; sudden weight gain; swelling of the ankles, feet, hands; unusually weak or tired)  · heart attack (trouble breathing; pain or tightness in the chest, neck, back or arms; unusually weak or tired)  · high potassium levels (chest pain; fast, irregular heartbeat; muscle weakness)  · kidney injury (trouble passing urine or change in the amount of urine)  · liver injury (dark yellow or brown urine; general ill feeling or flu-like symptoms; loss of appetite, right upper belly pain; unusually weak or tired, yellowing of the eyes or skin)  · low red blood cell counts (trouble breathing; feeling faint; lightheaded, falls; unusually weak or tired)  · palpitations  · rash, fever, and swollen lymph nodes  · redness, blistering, peeling, or loosening of the skin, including inside the mouth  · stroke (changes in vision; confusion; trouble speaking or understanding; severe headaches; sudden numbness or weakness of the face, arm or leg; trouble walking; dizziness; loss of balance or coordination)  · trouble breathing  Side effects that usually do not require medical attention (report to your doctor or health care provider if they continue or are bothersome):  · constipation  · diarrhea  · dizziness  · headache  · nausea, vomiting  · passing gas  This list may not describe all  possible side effects. Call your doctor for medical advice about side effects. You may report side effects to FDA at 9-724-MBV-4812.  Where should I keep my medicine?  Keep out of the reach of children and pets.  Store at room temperature between 20 and 25 degrees C (68 and 77 degrees F).  Get rid of any unused medicine after the expiration date.  To get rid of medicines that are no longer needed or have :  · Take the medicine to a medicine take-back program. Check with your pharmacy or law enforcement to find a location.  · If you cannot return the medicine, check the label or package insert to see if the medicine should be thrown out in the garbage or flushed down the toilet. If you are not sure, ask your health care provider. If it is safe to put it in the trash, empty the medicine out of the container. Mix the medicine with cat litter, dirt, coffee grounds, or other unwanted substance. Seal the mixture in a bag or container. Put it in the trash.  NOTE: This sheet is a summary. It may not cover all possible information. If you have questions about this medicine, talk to your doctor, pharmacist, or health care provider.  ©  [employer]vier/Gold Standard (2021 15:57:37)

## 2022-01-13 ENCOUNTER — PATIENT ROUNDING (BHMG ONLY) (OUTPATIENT)
Dept: PAIN MEDICINE | Facility: CLINIC | Age: 72
End: 2022-01-13

## 2022-02-03 DIAGNOSIS — G89.29 CHRONIC LEFT SHOULDER PAIN: ICD-10-CM

## 2022-02-03 DIAGNOSIS — M25.512 CHRONIC LEFT SHOULDER PAIN: ICD-10-CM

## 2022-02-03 RX ORDER — HYDROCODONE BITARTRATE AND ACETAMINOPHEN 10; 325 MG/1; MG/1
1 TABLET ORAL EVERY 4 HOURS PRN
Qty: 180 TABLET | Refills: 0 | Status: SHIPPED | OUTPATIENT
Start: 2022-02-03 | End: 2022-03-04 | Stop reason: SDUPTHER

## 2022-03-03 ENCOUNTER — TELEPHONE (OUTPATIENT)
Dept: PAIN MEDICINE | Facility: CLINIC | Age: 72
End: 2022-03-03

## 2022-03-03 DIAGNOSIS — M25.512 CHRONIC LEFT SHOULDER PAIN: ICD-10-CM

## 2022-03-03 DIAGNOSIS — G89.29 CHRONIC LEFT SHOULDER PAIN: ICD-10-CM

## 2022-03-03 NOTE — TELEPHONE ENCOUNTER
[facility] [employer]FF ATTEMPTED CLINICAL WARM TRANSFER - NO ANSWER     Caller: Gallo Leary    Relationship: Self    Best call back number: [phone]     Requested Prescriptions:   NORCO  MG (TAKE ONE EVERY 4 HOURS AS NEEDED)     Pharmacy where request should be sent:   [id number] PH: [phone]     Additional details provided by patient: PATIENT HAD TO RESCHEDULE 1 MONTH / LEFT SHOULDER / NO NEW INJURY / MED CHECK FOLLOW UP WHICH WAS SCHEDULED w/LETITIA DIXON 03-04-22 DUE TO A WORK CONFLICT     PATIENT WAS RESCHEDULED TO NEXT AVAILABLE APPT ON 03/10/22 BUT PATIENT STATED HE WILL RUN OUT OF HIS MEDICATION BY THIS COMING TUES 03-08-22     (ALSO ASKED ABOUT A TELEPHONE VISIT IF POSSIBLE IF NOT ABLE TO BE RESCHEDULED / WORKED IN BY 03-08-22?)     Does the patient have less than a 3 day supply:  [x] Yes - WILL RUN OUT BY 03-08-22     PLEASE CALL / LEAVE VMAIL TO DISCUSS MED REFILL & POSSIBLY RESCHEDULING FOLLOW UP SOONER     THANKS

## 2022-03-04 RX ORDER — HYDROCODONE BITARTRATE AND ACETAMINOPHEN 10; 325 MG/1; MG/1
1 TABLET ORAL EVERY 4 HOURS PRN
Qty: 12 TABLET | Refills: 0 | Status: SHIPPED | OUTPATIENT
Start: 2022-03-04 | End: 2022-03-10 | Stop reason: SDUPTHER

## 2022-03-04 NOTE — TELEPHONE ENCOUNTER
Please see. Appt conflict. Can he have a partial refill. You don't have any available appts before 3/8/22

## 2022-03-04 NOTE — TELEPHONE ENCOUNTER
Not due for refill until 3/8/22 (last refill was filled early, was not supposed to  until 2/6/2022). I sent in a 2 day supply to be filled 3/8/22.

## 2022-03-10 ENCOUNTER — OFFICE VISIT (OUTPATIENT)
Dept: PAIN MEDICINE | Facility: CLINIC | Age: 72
End: 2022-03-10

## 2022-03-10 VITALS
OXYGEN SATURATION: 96 % | TEMPERATURE: 97.3 F | BODY MASS INDEX: 30.02 KG/M2 | HEIGHT: 72 IN | WEIGHT: 221.6 LBS | SYSTOLIC BLOOD PRESSURE: 159 MMHG | DIASTOLIC BLOOD PRESSURE: 72 MMHG | HEART RATE: 79 BPM

## 2022-03-10 DIAGNOSIS — Z79.899 HIGH RISK MEDICATION USE: ICD-10-CM

## 2022-03-10 DIAGNOSIS — G89.4 CHRONIC PAIN SYNDROME: Primary | ICD-10-CM

## 2022-03-10 DIAGNOSIS — G89.29 CHRONIC LEFT SHOULDER PAIN: ICD-10-CM

## 2022-03-10 DIAGNOSIS — M25.512 CHRONIC LEFT SHOULDER PAIN: ICD-10-CM

## 2022-03-10 PROCEDURE — 80305 DRUG TEST PRSMV DIR OPT OBS: CPT | Performed by: NURSE PRACTITIONER

## 2022-03-10 PROCEDURE — 99213 OFFICE O/P EST LOW 20 MIN: CPT | Performed by: NURSE PRACTITIONER

## 2022-03-10 RX ORDER — HYDROCODONE BITARTRATE AND ACETAMINOPHEN 10; 325 MG/1; MG/1
1 TABLET ORAL EVERY 4 HOURS PRN
Qty: 180 TABLET | Refills: 0 | Status: SHIPPED | OUTPATIENT
Start: 2022-03-10 | End: 2022-04-08 | Stop reason: SDUPTHER

## 2022-03-10 NOTE — PROGRESS NOTES
CHIEF COMPLAINT  F/U shoulder pain- patient states that his pain has remained the same since his last visit.     Subjective   Gallo Leary is a 72 y.o. male  who presents for follow-up.  He has a history of shoulder pain.    Established care 1/5/2022. Seen by Dr. Cagle. Works full time at a printing company, works in factory chronic shoulder pain for many years.  Unsure of specific inciting event.  Says imaging has been normal in terms of any type of surgical pathology. Likely arthropathy, tendonopathy due to repetitive motion at work.  Went through physical therapy without benefit. Has been prescribed chronic pain medication for at least a couple of years.  Dr. Cagle agreed to take over prescribing due to the patient meeting his functional goals and continuation of a physical job.      C/o shoulder pain. Pain ranges from 9-10 without medication, 6/10 VAS today. Taking hydrocodone 10/325 6/day.  He reports that the medication helps reduce pain significantly. It allows him to maintain full time position in laborious job and allows him to sleep. He denies any adverse reactions.     Patient remained masked during entire encounter. No cough present. I donned a mask and eye protection throughout entire visit. Prior to donning mask and eye protection, hand hygiene was performed, as well as when it was doffed.  I was closer than 6 feet, but not for an extended period of time. No obvious exposure to any bodily fluids.    Shoulder Injury   There was no injury mechanism. The quality of the pain is described as aching. The pain is at a severity of 6/10. The pain is severe (without medication). Pertinent negatives include no chest pain or numbness. Treatments tried: Norco. The treatment provided moderate relief.     PEG Assessment   What number best describes your pain on average in the past week?4  What number best describes how, during the past week, pain has interfered with your enjoyment of life?0  What number  "best describes how, during the past week, pain has interfered with your general activity?  0    Review of Pertinent Medical Data ---  MRI SHOULDER (LEFT)      The following portions of the patient's history were reviewed and updated as appropriate: allergies, current medications, past family history, past medical history, past social history, past surgical history and problem list.    Review of Systems   Constitutional: Negative for activity change, chills, fatigue and fever.   HENT: Negative for congestion.    Eyes: Negative for visual disturbance.   Respiratory: Negative for chest tightness and shortness of breath.    Cardiovascular: Negative for chest pain.   Gastrointestinal: Negative for abdominal pain, constipation and diarrhea.   Genitourinary: Negative for difficulty urinating and dysuria.   Musculoskeletal: Negative for back pain.   Neurological: Negative for dizziness, weakness, light-headedness, numbness and headaches.   Psychiatric/Behavioral: Negative for agitation, self-injury, sleep disturbance and suicidal ideas. The patient is not nervous/anxious.      I have reviewed and confirmed the accuracy of the ROS as documented by the MA/LPN/RN ADIS Conteh    Vitals:    03/10/22 1113   BP: 159/72   Pulse: 79   Temp: 97.3 °F (36.3 °C)   SpO2: 96%   Weight: 101 kg (221 lb 9.6 oz)   Height: 182.9 cm (72\")   PainSc:   6   PainLoc: Shoulder         Objective   Physical Exam  Vitals and nursing note reviewed.   Constitutional:       General: He is not in acute distress.     Appearance: Normal appearance. He is not ill-appearing.   Pulmonary:      Effort: Pulmonary effort is normal. No respiratory distress.   Musculoskeletal:      Left shoulder: Tenderness present. Decreased range of motion.   Skin:     General: Skin is warm and dry.   Neurological:      Mental Status: He is alert and oriented to person, place, and time.      Motor: Motor function is intact.      Gait: Gait normal.   Psychiatric:        "  Mood and Affect: Mood normal.         Behavior: Behavior normal.       Assessment/Plan   Diagnoses and all orders for this visit:    1. Chronic pain syndrome (Primary)    2. Chronic left shoulder pain  -     HYDROcodone-acetaminophen (NORCO)  MG per tablet; Take 1 tablet by mouth Every 4 (Four) Hours As Needed for Severe Pain .  Dispense: 180 tablet; Refill: 0    3. High risk medication use      --- Refill Hydrocodone. Patient appears stable with current regimen. No adverse effects. Regarding continuation of opioids, there is no evidence of aberrant behavior or any red flags.  The patient continues with appropriate response to opioid therapy. ADL's remain intact by self.   --- The urine drug screen confirmation from 1/5/2022 has been reviewed and the result is appropriate based on patient history and ZION report  --- Routine UDS in office today as part of monitoring requirements for controlled substances.  The specimen was viewed and the immunoassay result reviewed and is +OPI.  This specimen will be sent to [facility] laboratory for confirmation.     --- The patient signed an updated copy of the controlled substance agreement on 1/5/2022  --- Could consider cervical imaging   --- Follow-up 1 month          ZION REPORT  As part of the patient's treatment plan, I am prescribing controlled substances. The patient has been made aware of appropriate use of such medications, including potential risk of somnolence, limited ability to drive and/or work safely, and the potential for dependence or overdose. It has also bee made clear that these medications are for use by this patient only, without concomitant use of alcohol or other substances unless prescribed.     Patient has completed prescribing agreement detailing terms of continued prescribing of controlled substances, including monitoring ZION reports, urine drug screening, and pill counts if necessary. The patient is aware that inappropriate use will  results in cessation of prescribing such medications.    As the clinician, I personally reviewed the ZION from 3/10/2022 while the patient was in the office today.    History and physical exam exhibit continued safe and appropriate use of controlled substances.     Dictated utilizing Dragon dictation.     This document is intended for medical expert use only. Reading of this document by patients and/or patient's family without participating medical staff guidance may result in misinterpretation and unintended morbidity.   Any interpretation of such data is the responsibility of the patient and/or family member responsible for the patient in concert with their primary or specialist providers, not to be left for sources of online searches such as [employer], [employer] or similar queries. Relying on these approaches to knowledge may result in misinterpretation, misguided goals of care and even death should patients or family members try recommendations outside of the realm of professional medical care in a supervised way.

## 2022-04-08 ENCOUNTER — OFFICE VISIT (OUTPATIENT)
Dept: PAIN MEDICINE | Facility: CLINIC | Age: 72
End: 2022-04-08

## 2022-04-08 VITALS
TEMPERATURE: 96.8 F | WEIGHT: 228 LBS | HEIGHT: 72 IN | SYSTOLIC BLOOD PRESSURE: 159 MMHG | RESPIRATION RATE: 18 BRPM | OXYGEN SATURATION: 94 % | DIASTOLIC BLOOD PRESSURE: 77 MMHG | BODY MASS INDEX: 30.88 KG/M2 | HEART RATE: 88 BPM

## 2022-04-08 DIAGNOSIS — M25.512 CHRONIC LEFT SHOULDER PAIN: ICD-10-CM

## 2022-04-08 DIAGNOSIS — Z79.899 HIGH RISK MEDICATION USE: ICD-10-CM

## 2022-04-08 DIAGNOSIS — G89.29 CHRONIC LEFT SHOULDER PAIN: ICD-10-CM

## 2022-04-08 DIAGNOSIS — G89.4 CHRONIC PAIN SYNDROME: Primary | ICD-10-CM

## 2022-04-08 PROCEDURE — 99213 OFFICE O/P EST LOW 20 MIN: CPT | Performed by: NURSE PRACTITIONER

## 2022-04-08 RX ORDER — HYDROCODONE BITARTRATE AND ACETAMINOPHEN 10; 325 MG/1; MG/1
1 TABLET ORAL EVERY 4 HOURS PRN
Qty: 180 TABLET | Refills: 0 | Status: SHIPPED | OUTPATIENT
Start: 2022-04-08 | End: 2022-05-06 | Stop reason: SDUPTHER

## 2022-04-08 NOTE — PROGRESS NOTES
CHIEF COMPLAINT  Follow-up for shoulder pain.    Subjective   Gallo Leary is a 72 y.o. male  who presents for follow-up.  He has a history of shoulder pain.    Established care 1/5/2022. Seen by Dr. Cagle. Works full time at a printing company, works in factory chronic shoulder pain for many years. Unsure of specific inciting event.  Says imaging has been normal in terms of any type of surgical pathology. Likely arthropathy, tendonopathy due to repetitive motion at work.  Went through physical therapy without benefit. Has been prescribed chronic pain medication for at least a couple of years.  Dr. Cagle agreed to take over prescribing due to the patient meeting his functional goals and continuation of a physical job.      C/o shoulder pain. Pain ranges from 9-10 without medication, 6/10 VAS today. Taking hydrocodone 10/325 6/day.  He reports that the medication helps reduce pain significantly. It allows him to maintain full time position in laborious job and allows him to sleep. He denies any adverse reactions.     Patient remained masked during entire encounter. No cough present. I donned a mask and eye protection throughout entire visit. Prior to donning mask and eye protection, hand hygiene was performed, as well as when it was doffed.  I was closer than 6 feet, but not for an extended period of time. No obvious exposure to any bodily fluids.    Shoulder Injury   There was no injury mechanism. The quality of the pain is described as aching. The pain is at a severity of 6/10. The pain is severe (without medication). Pertinent negatives include no chest pain or numbness. Treatments tried: Norco. The treatment provided moderate relief.      PEG Assessment   What number best describes your pain on average in the past week?5  What number best describes how, during the past week, pain has interfered with your enjoyment of life?0  What number best describes how, during the past week, pain has interfered with  "your general activity?  0      The following portions of the patient's history were reviewed and updated as appropriate: allergies, current medications, past family history, past medical history, past social history, past surgical history and problem list.    Review of Systems   Constitutional: Negative for fever.   HENT: Positive for congestion.    Eyes: Negative for visual disturbance.   Respiratory: Negative for shortness of breath.    Cardiovascular: Negative for chest pain.   Gastrointestinal: Negative for constipation and diarrhea.   Genitourinary: Negative for difficulty urinating.   Musculoskeletal: Positive for arthralgias (left shoulder).   Neurological: Negative for weakness and numbness.   Psychiatric/Behavioral: Negative for sleep disturbance and suicidal ideas. The patient is not nervous/anxious.      I have reviewed and confirmed the accuracy of the ROS as documented by the MA/TERA/RN ADIS Conteh    Vitals:    04/08/22 1529   BP: 159/77   Pulse: 88   Resp: 18   Temp: 96.8 °F (36 °C)   SpO2: 94%   Weight: 103 kg (228 lb)   Height: 182.9 cm (72\")   PainSc:   6   PainLoc: Shoulder         Objective   Physical Exam  Vitals and nursing note reviewed.   Constitutional:       General: He is not in acute distress.     Appearance: Normal appearance. He is not ill-appearing.   Pulmonary:      Effort: Pulmonary effort is normal. No respiratory distress.   Musculoskeletal:      Left shoulder: Tenderness present. Decreased range of motion.   Skin:     General: Skin is warm and dry.   Neurological:      Mental Status: He is alert and oriented to person, place, and time.      Motor: Motor function is intact.      Gait: Gait normal.   Psychiatric:         Mood and Affect: Mood normal.         Behavior: Behavior normal.       Assessment/Plan   Diagnoses and all orders for this visit:    1. Chronic pain syndrome (Primary)    2. Chronic left shoulder pain    3. High risk medication use      --- Refill " Hydrocodone. Patient appears stable with current regimen. No adverse effects. Regarding continuation of opioids, there is no evidence of aberrant behavior or any red flags.  The patient continues with appropriate response to opioid therapy. ADL's remain intact by self.   --- The urine drug screen confirmation from 3/10/2022 has been reviewed and the result is appropriate based on patient history and ZION report  --- The patient signed an updated copy of the controlled substance agreement on 1/5/2022  --- Follow-up 1 month        ZION REPORT  As part of the patient's treatment plan, I am prescribing controlled substances. The patient has been made aware of appropriate use of such medications, including potential risk of somnolence, limited ability to drive and/or work safely, and the potential for dependence or overdose. It has also bee made clear that these medications are for use by this patient only, without concomitant use of alcohol or other substances unless prescribed.     Patient has completed prescribing agreement detailing terms of continued prescribing of controlled substances, including monitoring ZION reports, urine drug screening, and pill counts if necessary. The patient is aware that inappropriate use will results in cessation of prescribing such medications.    As the clinician, I personally reviewed the ZION from 4/8/2022 while the patient was in the office today.    History and physical exam exhibit continued safe and appropriate use of controlled substances.     Dictated utilizing Dragon dictation.     This document is intended for medical expert use only. Reading of this document by patients and/or patient's family without participating medical staff guidance may result in misinterpretation and unintended morbidity.   Any interpretation of such data is the responsibility of the patient and/or family member responsible for the patient in concert with their primary or specialist providers,  not to be left for sources of online searches such as [employer], [employer] or similar queries. Relying on these approaches to knowledge may result in misinterpretation, misguided goals of care and even death should patients or family members try recommendations outside of the realm of professional medical care in a supervised way.

## 2022-05-06 ENCOUNTER — OFFICE VISIT (OUTPATIENT)
Dept: PAIN MEDICINE | Facility: CLINIC | Age: 72
End: 2022-05-06

## 2022-05-06 VITALS
TEMPERATURE: 97.3 F | HEART RATE: 88 BPM | HEIGHT: 72 IN | WEIGHT: 227.8 LBS | OXYGEN SATURATION: 94 % | BODY MASS INDEX: 30.85 KG/M2 | DIASTOLIC BLOOD PRESSURE: 81 MMHG | SYSTOLIC BLOOD PRESSURE: 129 MMHG

## 2022-05-06 DIAGNOSIS — Z79.899 HIGH RISK MEDICATION USE: ICD-10-CM

## 2022-05-06 DIAGNOSIS — G89.29 CHRONIC LEFT SHOULDER PAIN: ICD-10-CM

## 2022-05-06 DIAGNOSIS — G89.4 CHRONIC PAIN SYNDROME: Primary | ICD-10-CM

## 2022-05-06 DIAGNOSIS — M25.512 CHRONIC LEFT SHOULDER PAIN: ICD-10-CM

## 2022-05-06 PROCEDURE — 99213 OFFICE O/P EST LOW 20 MIN: CPT | Performed by: NURSE PRACTITIONER

## 2022-05-06 RX ORDER — HYDROCODONE BITARTRATE AND ACETAMINOPHEN 10; 325 MG/1; MG/1
1 TABLET ORAL EVERY 4 HOURS PRN
Qty: 180 TABLET | Refills: 0 | Status: SHIPPED | OUTPATIENT
Start: 2022-05-06 | End: 2022-06-03 | Stop reason: SDUPTHER

## 2022-05-06 NOTE — PROGRESS NOTES
"CHIEF COMPLAINT  F/U left shoulder pain    Subjective   Gallo Leary is a 72 y.o. male  who presents for follow-up.  He has a history of left shoulder pain. This has been an ongoing issue for the last 3 to 4 years. No inciting event. He has previously tried OTC medications, Volataren gel, and PT with no relief. Describes pain as a \"deep, grinding pain\". Pain is worsen by specific movements, unable to sleep on left side. Pain is made better with medication and position changes.    Pain is relived with Norco 10/325mg. Takes 6/day. Denies side effects including constipation and somnolence. ADL's per self. No issues with incontinence of bowel or bladder. 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     Patient remained masked during entire encounter. No cough present. I donned a mask and eye protection throughout entire visit. Prior to donning mask and eye protection, hand hygiene was performed, as well as when it was doffed.  I was closer than 6 feet, but not for an extended period of time. No obvious exposure to any bodily fluids.    Shoulder Injury   The left shoulder is affected. There was no injury mechanism. The quality of the pain is described as aching (deep, grinding pain). The pain is at a severity of 5/10. The pain is severe (without medication). Pertinent negatives include no chest pain or numbness. The symptoms are aggravated by movement. Treatments tried: Norco. The treatment provided moderate relief.        PEG Assessment   What number best describes your pain on average in the past week?4  What number best describes how, during the past week, pain has interfered with your enjoyment of life?2  What number best describes how, during the past week, pain has interfered with your general activity?  5    The following portions of the patient's history were reviewed and updated as appropriate: allergies, current medications, past family history, past medical history, past social history, past " "surgical history and problem list.    Review of Systems   Constitutional: Positive for fatigue. Negative for activity change, chills and fever.   HENT: Negative for congestion.    Eyes: Negative for visual disturbance.   Respiratory: Negative for chest tightness and shortness of breath.    Cardiovascular: Negative for chest pain.   Gastrointestinal: Negative for abdominal pain, constipation and diarrhea.   Genitourinary: Negative for difficulty urinating and dysuria.   Musculoskeletal: Negative for back pain and neck pain.   Neurological: Negative for dizziness, weakness, light-headedness, numbness and headaches.   Psychiatric/Behavioral: Negative for agitation and sleep disturbance. The patient is not nervous/anxious.      I have reviewed and confirmed the accuracy of the ROS as documented by the MA/LPN/RN ADIS Conteh    Vitals:    05/06/22 1532   BP: 129/81   Pulse: 88   Temp: 97.3 °F (36.3 °C)   SpO2: 94%   Weight: 103 kg (227 lb 12.8 oz)   Height: 182.9 cm (72\")   PainSc:   5   PainLoc: Shoulder     Objective   Physical Exam  Vitals and nursing note reviewed.   Constitutional:       General: He is not in acute distress.     Appearance: Normal appearance. He is not ill-appearing.   Pulmonary:      Effort: Pulmonary effort is normal. No respiratory distress.   Skin:     General: Skin is warm and dry.   Neurological:      Mental Status: He is alert and oriented to person, place, and time.      Motor: Motor function is intact.      Gait: Gait normal.   Psychiatric:         Mood and Affect: Mood normal.         Behavior: Behavior normal.       Assessment/Plan   Diagnoses and all orders for this visit:    1. Chronic pain syndrome (Primary)    2. Chronic left shoulder pain  -     HYDROcodone-acetaminophen (NORCO)  MG per tablet; Take 1 tablet by mouth Every 4 (Four) Hours As Needed for Severe Pain . dnf 5/10/2022  Dispense: 180 tablet; Refill: 0    3. High risk medication use      --- Refill " Hydrocodone. Patient appears stable with current regimen. No adverse effects. Regarding continuation of opioids, there is no evidence of aberrant behavior or any red flags.  The patient continues with appropriate response to opioid therapy. ADL's remain intact by self.   --- The urine drug screen confirmation from 3/10/2022 has been reviewed and the result is appropriate based on patient history and ZION report  --- The patient signed an updated copy of the controlled substance agreement on 1/5/2022  --- Follow-up 1 month          ZION REPORT  As part of the patient's treatment plan, I am prescribing controlled substances. The patient has been made aware of appropriate use of such medications, including potential risk of somnolence, limited ability to drive and/or work safely, and the potential for dependence or overdose. It has also bee made clear that these medications are for use by this patient only, without concomitant use of alcohol or other substances unless prescribed.     Patient has completed prescribing agreement detailing terms of continued prescribing of controlled substances, including monitoring ZION reports, urine drug screening, and pill counts if necessary. The patient is aware that inappropriate use will results in cessation of prescribing such medications.    As the clinician, I personally reviewed the ZION from 5/6/2022 while the patient was in the office today.    History and physical exam exhibit continued safe and appropriate use of controlled substances.     Dictated utilizing Dragon dictation.     This document is intended for medical expert use only. Reading of this document by patients and/or patient's family without participating medical staff guidance may result in misinterpretation and unintended morbidity.   Any interpretation of such data is the responsibility of the patient and/or family member responsible for the patient in concert with their primary or specialist  providers, not to be left for sources of online searches such as [employer], [employer] or similar queries. Relying on these approaches to knowledge may result in misinterpretation, misguided goals of care and even death should patients or family members try recommendations outside of the realm of professional medical care in a supervised way.

## 2022-06-03 ENCOUNTER — OFFICE VISIT (OUTPATIENT)
Dept: PAIN MEDICINE | Facility: CLINIC | Age: 72
End: 2022-06-03

## 2022-06-03 VITALS
HEART RATE: 86 BPM | HEIGHT: 72 IN | BODY MASS INDEX: 30.69 KG/M2 | SYSTOLIC BLOOD PRESSURE: 155 MMHG | WEIGHT: 226.6 LBS | TEMPERATURE: 96.8 F | DIASTOLIC BLOOD PRESSURE: 51 MMHG | OXYGEN SATURATION: 93 %

## 2022-06-03 DIAGNOSIS — G89.29 CHRONIC LEFT SHOULDER PAIN: ICD-10-CM

## 2022-06-03 DIAGNOSIS — M25.512 CHRONIC LEFT SHOULDER PAIN: ICD-10-CM

## 2022-06-03 DIAGNOSIS — Z79.899 HIGH RISK MEDICATION USE: ICD-10-CM

## 2022-06-03 DIAGNOSIS — G89.4 CHRONIC PAIN SYNDROME: Primary | ICD-10-CM

## 2022-06-03 PROCEDURE — 99213 OFFICE O/P EST LOW 20 MIN: CPT | Performed by: NURSE PRACTITIONER

## 2022-06-03 RX ORDER — HYDROCODONE BITARTRATE AND ACETAMINOPHEN 10; 325 MG/1; MG/1
1 TABLET ORAL EVERY 4 HOURS PRN
Qty: 180 TABLET | Refills: 0 | Status: SHIPPED | OUTPATIENT
Start: 2022-06-03 | End: 2022-07-08 | Stop reason: SDUPTHER

## 2022-06-03 NOTE — PROGRESS NOTES
"CHIEF COMPLAINT  F/U shoulder pain    Subjective   Gallo Leary is a 72 y.o. male  who presents for follow-up.  He has a history of left shoulder pain. He states that his pain has remained the same since his last visit.    He has a history of left shoulder pain. This has been an ongoing issue for the last 3 to 4 years. No inciting event. He has previously tried OTC medications, Volataren gel, and PT with no relief. Describes pain as a \"deep, grinding pain\". Pain is worsen by specific movements, unable to sleep on left side. Pain is made better with medication and position changes.     Pain today is 5/10VAS and is relived with Norco 10/325mg. Takes 6/day. Denies side effects including constipation and somnolence. ADL's per self. No issues with incontinence of bowel or bladder. 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     Patient remained masked during entire encounter. No cough present. I donned a mask and eye protection throughout entire visit. Prior to donning mask and eye protection, hand hygiene was performed, as well as when it was doffed.  I was closer than 6 feet, but not for an extended period of time. No obvious exposure to any bodily fluids.    Shoulder Injury   The left shoulder is affected. There was no injury mechanism. The quality of the pain is described as aching. The pain is at a severity of 5/10 (unchanged since last visit). The pain is severe (without medication). Pertinent negatives include no chest pain or numbness. The symptoms are aggravated by movement. Treatments tried: Norco. The treatment provided moderate relief.      PEG Assessment   What number best describes your pain on average in the past week?4  What number best describes how, during the past week, pain has interfered with your enjoyment of life?0  What number best describes how, during the past week, pain has interfered with your general activity?  0    The following portions of the patient's history were reviewed " "and updated as appropriate: allergies, current medications, past family history, past medical history, past social history, past surgical history and problem list.    Review of Systems   Constitutional: Negative for activity change, chills, fatigue and fever.   HENT: Negative for congestion.    Eyes: Negative for visual disturbance.   Respiratory: Negative for chest tightness and shortness of breath.    Cardiovascular: Negative for chest pain.   Gastrointestinal: Negative for abdominal pain, constipation and diarrhea.   Genitourinary: Negative for difficulty urinating and dysuria.   Musculoskeletal: Positive for back pain.   Neurological: Negative for dizziness, weakness, light-headedness, numbness and headaches.   Psychiatric/Behavioral: Negative for agitation and sleep disturbance. The patient is not nervous/anxious.      I have reviewed and confirmed the accuracy of the ROS as documented by the MA/LPN/RN ADIS Conteh    Vitals:    06/03/22 1509   BP: 155/51   Pulse: 86   Temp: 96.8 °F (36 °C)   SpO2: 93%   Weight: 103 kg (226 lb 9.6 oz)   Height: 182.9 cm (72\")   PainSc:   5   PainLoc: Shoulder     Objective   Physical Exam  Vitals and nursing note reviewed.   Constitutional:       General: He is not in acute distress.     Appearance: Normal appearance. He is not ill-appearing.   Pulmonary:      Effort: Pulmonary effort is normal. No respiratory distress.   Musculoskeletal:      Left shoulder: Tenderness present. Decreased range of motion.   Skin:     General: Skin is warm and dry.   Neurological:      Mental Status: He is alert and oriented to person, place, and time.      Motor: Motor function is intact.      Gait: Gait normal.   Psychiatric:         Mood and Affect: Mood normal.         Behavior: Behavior normal.       Assessment & Plan   Diagnoses and all orders for this visit:    1. Chronic pain syndrome (Primary)    2. Chronic left shoulder pain  -     HYDROcodone-acetaminophen (NORCO)  MG " per tablet; Take 1 tablet by mouth Every 4 (Four) Hours As Needed for Severe Pain . dnf 6/9/2022  Dispense: 180 tablet; Refill: 0    3. High risk medication use      --- Refill Hydrocodone. Patient appears stable with current regimen. No adverse effects. Regarding continuation of opioids, there is no evidence of aberrant behavior or any red flags.  The patient continues with appropriate response to opioid therapy. ADL's remain intact by self.   --- The urine drug screen confirmation from 3/10/2022 has been reviewed and the result is appropriate based on patient history and ZION report.  --- The patient signed an updated copy of the controlled substance agreement on 1/5/2022.  --- Follow-up 1 month or sooner if needed     ZION REPORT  As part of the patient's treatment plan, I am prescribing controlled substances. The patient has been made aware of appropriate use of such medications, including potential risk of somnolence, limited ability to drive and/or work safely, and the potential for dependence or overdose. It has also bee made clear that these medications are for use by this patient only, without concomitant use of alcohol or other substances unless prescribed.     Patient has completed prescribing agreement detailing terms of continued prescribing of controlled substances, including monitoring ZION reports, urine drug screening, and pill counts if necessary. The patient is aware that inappropriate use will results in cessation of prescribing such medications.    As the clinician, I personally reviewed the ZION from 6/3/22 while the patient was in the office today.    History and physical exam exhibit continued safe and appropriate use of controlled substances.    Dictated utilizing Dragon dictation.     This document is intended for medical expert use only. Reading of this document by patients and/or patient's family without participating medical staff guidance may result in misinterpretation and  unintended morbidity.   Any interpretation of such data is the responsibility of the patient and/or family member responsible for the patient in concert with their primary or specialist providers, not to be left for sources of online searches such as [employer], [employer] or similar queries. Relying on these approaches to knowledge may result in misinterpretation, misguided goals of care and even death should patients or family members try recommendations outside of the realm of professional medical care in a supervised way.

## 2022-07-08 ENCOUNTER — OFFICE VISIT (OUTPATIENT)
Dept: PAIN MEDICINE | Facility: CLINIC | Age: 72
End: 2022-07-08

## 2022-07-08 VITALS
WEIGHT: 227 LBS | BODY MASS INDEX: 30.75 KG/M2 | SYSTOLIC BLOOD PRESSURE: 154 MMHG | HEART RATE: 85 BPM | OXYGEN SATURATION: 97 % | DIASTOLIC BLOOD PRESSURE: 93 MMHG | RESPIRATION RATE: 20 BRPM | HEIGHT: 72 IN | TEMPERATURE: 96.8 F

## 2022-07-08 DIAGNOSIS — M25.512 CHRONIC LEFT SHOULDER PAIN: ICD-10-CM

## 2022-07-08 DIAGNOSIS — G89.29 CHRONIC LEFT SHOULDER PAIN: ICD-10-CM

## 2022-07-08 DIAGNOSIS — Z79.899 HIGH RISK MEDICATION USE: ICD-10-CM

## 2022-07-08 DIAGNOSIS — G89.4 CHRONIC PAIN SYNDROME: Primary | ICD-10-CM

## 2022-07-08 PROCEDURE — 99213 OFFICE O/P EST LOW 20 MIN: CPT

## 2022-07-08 RX ORDER — HYDROCODONE BITARTRATE AND ACETAMINOPHEN 10; 325 MG/1; MG/1
1 TABLET ORAL EVERY 4 HOURS PRN
Qty: 180 TABLET | Refills: 0 | Status: SHIPPED | OUTPATIENT
Start: 2022-07-08 | End: 2022-08-08 | Stop reason: SDUPTHER

## 2022-07-08 NOTE — PROGRESS NOTES
CHIEF COMPLAINT  Left shoulder pain    Subjective   Gallo Leary is a 72 y.o. male  who presents for follow-up.  He has a history of chronic left shoulder pain.    Today pain is 7/10VAS in severity. Pain is located in his left shoulder. Describes this pain as continuous ache . Pain is worsened by certain movements and overhead lifting. Pain improved with medication. He has not tried heat or ice.    Continues with Hydrocodone 10mg 6/day. Denies any side effects from the regimen, including constipation and somnolence. The regimen helps decrease pain by 50%. Notes improvement in activity and function with regimen. ADL's by self. Denies any bowel or bladder changes.     Patient remained masked during entire encounter. No cough present. I donned a mask and eye protection throughout entire visit. Prior to donning mask and eye protection, hand hygiene was performed, as well as when it was doffed.  I was closer than 6 feet, but not for an extended period of time. No obvious exposure to any bodily fluids.    Shoulder Injury   The left shoulder is affected. The incident occurred more than 1 week ago. The injury mechanism was repetitive motion. The quality of the pain is described as aching (continuous). The pain does not radiate. The pain is at a severity of 7/10. Pertinent negatives include no chest pain or numbness. The symptoms are aggravated by movement and overhead lifting. Treatments tried: Hydrocodone.      PEG Assessment   What number best describes your pain on average in the past week?7  What number best describes how, during the past week, pain has interfered with your enjoyment of life?7  What number best describes how, during the past week, pain has interfered with your general activity?  7    Review of Pertinent Medical Data ---  Reviewed office note from ADIS Conteh 6/3/2022.  Patient presents for a follow-up appointment.  Has a history of left shoulder pain that has been ongoing for the last  "3 to 4 years. Patient can recall no inciting event.  He has tried over-the-counter medication Voltaren gel and PT with no relief.  Reports that pain is worsened by certain movements and sleeping on his left side.  Pain improves with medication and position changes.  Pain medication regimen includes Hydrocodone 10 mg.  He takes 6 tablets daily.  No changes were made at this office appointment.  Patient will continue to follow-up monthly for medication management.    The following portions of the patient's history were reviewed and updated as appropriate: allergies, current medications, past family history, past medical history, past social history, past surgical history and problem list.    Review of Systems   Constitutional: Negative for activity change, fatigue and fever.   HENT: Negative for congestion.    Eyes: Negative for visual disturbance.   Respiratory: Negative for cough and shortness of breath.    Cardiovascular: Negative for chest pain.   Gastrointestinal: Negative for constipation and diarrhea.   Genitourinary: Negative for difficulty urinating.   Musculoskeletal: Positive for arthralgias (left shoulder).   Neurological: Negative for dizziness, weakness, light-headedness, numbness and headaches.   Psychiatric/Behavioral: Negative for agitation, sleep disturbance and suicidal ideas. The patient is not nervous/anxious.      I have reviewed and confirmed the accuracy of the ROS as documented by the MA/LPN/RN ADIS Rodriguez    Vitals:    07/08/22 1530   BP: 154/93   Pulse: 85   Resp: 20   Temp: 96.8 °F (36 °C)   SpO2: 97%   Weight: 103 kg (227 lb)   Height: 182.9 cm (72\")   PainSc:   7   PainLoc: Shoulder     Objective   Physical Exam  Constitutional:       Appearance: Normal appearance.   HENT:      Head: Normocephalic.   Cardiovascular:      Rate and Rhythm: Normal rate and regular rhythm.   Pulmonary:      Effort: Pulmonary effort is normal.      Breath sounds: Normal breath sounds. "   Musculoskeletal:      Left shoulder: Tenderness present. Decreased range of motion.      Cervical back: Normal range of motion.      Lumbar back: Tenderness present.   Skin:     General: Skin is warm and dry.      Capillary Refill: Capillary refill takes less than 2 seconds.   Neurological:      General: No focal deficit present.      Mental Status: He is alert and oriented to person, place, and time.   Psychiatric:         Mood and Affect: Mood normal.         Behavior: Behavior normal.         Thought Content: Thought content normal.         Cognition and Memory: Cognition normal.       Assessment & Plan   Diagnoses and all orders for this visit:    1. Chronic pain syndrome (Primary)    2. Chronic left shoulder pain    3. High risk medication use    --- The urine drug screen confirmation from 3/10/22 has been reviewed and the result is appropriate based on patient history and ZION report.  --- CSA updated 1/5/22.  --- Refill Hydrocodone. DNF 7/9/22. Patient appears stable with current regimen. No adverse effects. Regarding continuation of opioids, there is no evidence of aberrant behavior or any red flags.  The patient continues with appropriate response to opioid therapy. ADL's remain intact by self.   --- Follow-up 1 month or sooner if needed     ZION REPORT  As part of the patient's treatment plan, I am prescribing controlled substances. The patient has been made aware of appropriate use of such medications, including potential risk of somnolence, limited ability to drive and/or work safely, and the potential for dependence or overdose. It has also been made clear that these medications are for use by this patient only, without concomitant use of alcohol or other substances unless prescribed.     Patient has completed prescribing agreement detailing terms of continued prescribing of controlled substances, including monitoring ZION reports, urine drug screening, and pill counts if necessary. The patient  is aware that inappropriate use will results in cessation of prescribing such medications.    As the clinician, I personally reviewed the ZION from 7/8/22 while the patient was in the office today.    History and physical exam exhibit continued safe and appropriate use of controlled substances.    Dictated utilizing Dragon dictation.     This document is intended for medical expert use only. Reading of this document by patients and/or patient's family without participating medical staff guidance may result in misinterpretation and unintended morbidity.   Any interpretation of such data is the responsibility of the patient and/or family member responsible for the patient in concert with their primary or specialist providers, not to be left for sources of online searches such as [employer], [employer] or similar queries. Relying on these approaches to knowledge may result in misinterpretation, misguided goals of care and even death should patients or family members try recommendations outside of the realm of professional medical care in a supervised way.

## 2022-08-08 ENCOUNTER — OFFICE VISIT (OUTPATIENT)
Dept: PAIN MEDICINE | Facility: CLINIC | Age: 72
End: 2022-08-08

## 2022-08-08 VITALS
BODY MASS INDEX: 29.93 KG/M2 | OXYGEN SATURATION: 95 % | WEIGHT: 221 LBS | HEIGHT: 72 IN | SYSTOLIC BLOOD PRESSURE: 127 MMHG | HEART RATE: 80 BPM | DIASTOLIC BLOOD PRESSURE: 78 MMHG | TEMPERATURE: 96.9 F | RESPIRATION RATE: 20 BRPM

## 2022-08-08 DIAGNOSIS — G89.29 CHRONIC LEFT SHOULDER PAIN: ICD-10-CM

## 2022-08-08 DIAGNOSIS — M25.512 CHRONIC LEFT SHOULDER PAIN: ICD-10-CM

## 2022-08-08 DIAGNOSIS — G89.4 CHRONIC PAIN SYNDROME: Primary | ICD-10-CM

## 2022-08-08 PROCEDURE — 99213 OFFICE O/P EST LOW 20 MIN: CPT

## 2022-08-08 RX ORDER — HYDROCODONE BITARTRATE AND ACETAMINOPHEN 10; 325 MG/1; MG/1
1 TABLET ORAL EVERY 4 HOURS PRN
Qty: 180 TABLET | Refills: 0 | Status: SHIPPED | OUTPATIENT
Start: 2022-08-08 | End: 2022-08-09 | Stop reason: SDUPTHER

## 2022-08-08 NOTE — PROGRESS NOTES
CHIEF COMPLAINT  Left shoulder pain    Subjective   Gallo Leary is a 72 y.o. male  who presents for follow-up.  He has a history of left shoulder pain. He reports that his pain level has been slightly higher the last few days due to lifting something heavy at work.    Today pain is 7/10VAS in severity. Pain is located in his left shoulder. Describes this pain as nearly continuous ache. Pain is worsened by certain positions and overhead/heavy lifting. Pain improves with medications. He has not tried heat or ice.    Continues with Hydrocodone 10mg 6/day. Denies any side effects from the regimen, including constipation and somnolence. The regimen helps decrease pain by 50%. Notes improvement in activity and function with regimen. ADL's by self. Denies any bowel or bladder changes.     Shoulder Injury   The left shoulder is affected. The incident occurred more than 1 week ago. The injury mechanism was repetitive motion. The quality of the pain is described as aching (continuous). The pain does not radiate. The pain is at a severity of 7/10. Pertinent negatives include no chest pain or numbness. The symptoms are aggravated by movement and overhead lifting. Treatments tried: Hydrocodone.      PEG Assessment   What number best describes your pain on average in the past week?7  What number best describes how, during the past week, pain has interfered with your enjoyment of life?7  What number best describes how, during the past week, pain has interfered with your general activity?  7    The following portions of the patient's history were reviewed and updated as appropriate: allergies, current medications, past family history, past medical history, past social history, past surgical history and problem list.    Review of Systems   Constitutional: Negative for activity change, fatigue and fever.   HENT: Negative for congestion.    Eyes: Negative for visual disturbance.   Respiratory: Negative for cough and shortness  "of breath.    Cardiovascular: Negative for chest pain.   Gastrointestinal: Negative for constipation and diarrhea.   Genitourinary: Negative for difficulty urinating.   Musculoskeletal: Positive for arthralgias (left shoulder).   Neurological: Negative for dizziness, weakness, light-headedness, numbness and headaches.   Psychiatric/Behavioral: Negative for agitation, sleep disturbance and suicidal ideas. The patient is not nervous/anxious.      I have reviewed and confirmed the accuracy of the ROS as documented by the MA/LPN/RN ADIS Rodriguez    Vitals:    08/08/22 1531   BP: 127/78   Pulse: 80   Resp: 20   Temp: 96.9 °F (36.1 °C)   SpO2: 95%   Weight: 100 kg (221 lb)   Height: 182.9 cm (72\")   PainSc:   7   PainLoc: Shoulder  Comment: left     Objective   Physical Exam  Constitutional:       Appearance: Normal appearance.   HENT:      Head: Normocephalic.   Cardiovascular:      Rate and Rhythm: Normal rate and regular rhythm.   Pulmonary:      Effort: Pulmonary effort is normal.      Breath sounds: Normal breath sounds.   Musculoskeletal:      Left shoulder: Tenderness present. Decreased range of motion.      Cervical back: Normal range of motion.   Skin:     General: Skin is warm and dry.      Capillary Refill: Capillary refill takes less than 2 seconds.   Neurological:      General: No focal deficit present.      Mental Status: He is alert and oriented to person, place, and time.   Psychiatric:         Mood and Affect: Mood normal.         Behavior: Behavior normal.         Thought Content: Thought content normal.         Cognition and Memory: Cognition normal.       Assessment & Plan   Diagnoses and all orders for this visit:    1. Chronic pain syndrome (Primary)    2. Chronic left shoulder pain  -     HYDROcodone-acetaminophen (NORCO)  MG per tablet; Take 1 tablet by mouth Every 4 (Four) Hours As Needed for Severe Pain .  Dispense: 180 tablet; Refill: 0    --- The urine drug screen confirmation " from 3/10/22 has been reviewed and the result is appropriate based on patient history and ZION report  --- The patient signed an updated copy of the controlled substance agreement on 1/5/22.  --- Refill Hydrocodone. Patient appears stable with current regimen. No adverse effects. Regarding continuation of opioids, there is no evidence of aberrant behavior or any red flags. The patient continues with appropriate response to opioid therapy. ADL's remain intact by self.   --- Follow-up 1 month.     ZION REPORT  As part of the patient's treatment plan, I am prescribing controlled substances. The patient has been made aware of appropriate use of such medications, including potential risk of somnolence, limited ability to drive and/or work safely, and the potential for dependence or overdose. It has also been made clear that these medications are for use by this patient only, without concomitant use of alcohol or other substances unless prescribed.     Patient has completed prescribing agreement detailing terms of continued prescribing of controlled substances, including monitoring ZION reports, urine drug screening, and pill counts if necessary. The patient is aware that inappropriate use will results in cessation of prescribing such medications.    As the clinician, I personally reviewed the ZION from 8/8/22 while the patient was in the office today.    History and physical exam exhibit continued safe and appropriate use of controlled substances.    Dictated utilizing Dragon dictation.     This document is intended for medical expert use only. Reading of this document by patients and/or patient's family without participating medical staff guidance may result in misinterpretation and unintended morbidity.   Any interpretation of such data is the responsibility of the patient and/or family member responsible for the patient in concert with their primary or specialist providers, not to be left for sources of online  searches such as [employer], [facility] or similar queries. Relying on these approaches to knowledge may result in misinterpretation, misguided goals of care and even death should patients or family members try recommendations outside of the realm of professional medical care in a supervised way.    Patient remained masked during entire encounter. No cough present. I donned a mask and eye protection throughout entire visit. Prior to donning mask and eye protection, hand hygiene was performed, as well as when it was doffed.  I was closer than 6 feet, but not for an extended period of time. No obvious exposure to any bodily fluids.

## 2022-08-09 ENCOUNTER — TELEPHONE (OUTPATIENT)
Dept: PAIN MEDICINE | Facility: CLINIC | Age: 72
End: 2022-08-09

## 2022-08-09 DIAGNOSIS — M25.512 CHRONIC LEFT SHOULDER PAIN: ICD-10-CM

## 2022-08-09 DIAGNOSIS — G89.29 CHRONIC LEFT SHOULDER PAIN: ICD-10-CM

## 2022-08-09 RX ORDER — HYDROCODONE BITARTRATE AND ACETAMINOPHEN 10; 325 MG/1; MG/1
1 TABLET ORAL EVERY 4 HOURS PRN
Qty: 180 TABLET | Refills: 0 | Status: SHIPPED | OUTPATIENT
Start: 2022-08-09 | End: 2022-09-08 | Stop reason: SDUPTHER

## 2022-08-09 NOTE — TELEPHONE ENCOUNTER
Provider: SHERRON DIXON     Caller: KORTNEY WILSON     Relationship to Patient: SELF     Phone Number: [phone]    Reason for Call: PATIENT CALLED AND STATED THAT HE NEEDS A CALL BACK ASAP FOR SOME IMPORTANT INFORMATION PLEASE ADVISE

## 2022-08-09 NOTE — TELEPHONE ENCOUNTER
I spoke with  Gibran and he states that his [facility] pharmacy is out of his hydro/apap Rx. I called the pharmacy and spoke with Ofe, she states they are out of the medication. I cancelled the Rx. He would like for you to send the Rx to [facility] pharmacy. I called and they have the medication in stock.

## 2022-09-08 ENCOUNTER — OFFICE VISIT (OUTPATIENT)
Dept: PAIN MEDICINE | Facility: CLINIC | Age: 72
End: 2022-09-08

## 2022-09-08 VITALS
OXYGEN SATURATION: 93 % | SYSTOLIC BLOOD PRESSURE: 160 MMHG | WEIGHT: 224 LBS | DIASTOLIC BLOOD PRESSURE: 95 MMHG | RESPIRATION RATE: 20 BRPM | HEIGHT: 72 IN | HEART RATE: 75 BPM | BODY MASS INDEX: 30.34 KG/M2 | TEMPERATURE: 96.7 F

## 2022-09-08 DIAGNOSIS — G89.29 CHRONIC LEFT SHOULDER PAIN: Primary | ICD-10-CM

## 2022-09-08 DIAGNOSIS — G89.4 CHRONIC PAIN SYNDROME: ICD-10-CM

## 2022-09-08 DIAGNOSIS — M25.512 CHRONIC LEFT SHOULDER PAIN: Primary | ICD-10-CM

## 2022-09-08 DIAGNOSIS — Z79.899 HIGH RISK MEDICATION USE: ICD-10-CM

## 2022-09-08 PROCEDURE — 80305 DRUG TEST PRSMV DIR OPT OBS: CPT

## 2022-09-08 PROCEDURE — 99213 OFFICE O/P EST LOW 20 MIN: CPT

## 2022-09-08 RX ORDER — HYDROCODONE BITARTRATE AND ACETAMINOPHEN 10; 325 MG/1; MG/1
1 TABLET ORAL EVERY 4 HOURS PRN
Qty: 180 TABLET | Refills: 0 | Status: SHIPPED | OUTPATIENT
Start: 2022-09-08 | End: 2022-10-07 | Stop reason: SDUPTHER

## 2022-09-08 NOTE — PROGRESS NOTES
"CHIEF COMPLAINT  Left shoulder pain    Subjective   Gallo Leary is a 72 y.o. male  who presents for follow-up.  He has a history of left shoulder pain. He reports that his pain level has remained consistent since his last office visit.    Today pain is 5/10VAS in severity. Pain is located in his left shoulder. Describes this pain as nearly continuous ache. Pain is worsened by certain positions and overhead/heavy lifting. Pain improves with medications. He has not tried heat or ice.     Continues with Hydrocodone 10mg 6/day. Denies any side effects from the regimen, including constipation and somnolence. The regimen helps decrease pain by 50%. He reports that pain medications helps \"keep pain at bay and allows me to work\". Notes improvement in activity and function with regimen. ADL's by self. Denies any bowel or bladder changes.     Shoulder Injury   The left shoulder is affected. The incident occurred more than 1 week ago. The injury mechanism was repetitive motion. The quality of the pain is described as aching (continuous). The pain does not radiate. The pain is at a severity of 5/10. Pertinent negatives include no chest pain or numbness. The symptoms are aggravated by movement and overhead lifting. Treatments tried: Hydrocodone.      PEG Assessment   What number best describes your pain on average in the past week?5  What number best describes how, during the past week, pain has interfered with your enjoyment of life?5  What number best describes how, during the past week, pain has interfered with your general activity?  5    The following portions of the patient's history were reviewed and updated as appropriate: allergies, current medications, past family history, past medical history, past social history, past surgical history and problem list.    Review of Systems   Constitutional: Negative for activity change, fatigue and fever.   HENT: Negative for congestion.    Eyes: Negative for visual " "disturbance.   Respiratory: Negative for cough and shortness of breath.    Cardiovascular: Negative for chest pain.   Gastrointestinal: Negative for constipation and diarrhea.   Genitourinary: Negative for difficulty urinating.   Musculoskeletal: Positive for arthralgias (left shoulder).   Neurological: Negative for dizziness, weakness, light-headedness, numbness and headaches.   Psychiatric/Behavioral: Negative for agitation, sleep disturbance and suicidal ideas.     I have reviewed and confirmed the accuracy of the ROS as documented by the MA/JAIMEN/RN ADIS Rodriguez    Vitals:    09/08/22 1508   BP: 160/95  Comment: pt not on bp meds   Pulse: 75   Resp: 20   Temp: 96.7 °F (35.9 °C)   SpO2: 93%   Weight: 102 kg (224 lb)   Height: 182.9 cm (72\")   PainSc:   5   PainLoc: Shoulder  Comment: left     Objective   Physical Exam  Constitutional:       Appearance: Normal appearance.   HENT:      Head: Normocephalic.   Cardiovascular:      Rate and Rhythm: Normal rate and regular rhythm.   Pulmonary:      Effort: Pulmonary effort is normal.      Breath sounds: Normal breath sounds.   Musculoskeletal:      Left shoulder: Tenderness present. Decreased range of motion.      Cervical back: Normal range of motion.   Skin:     General: Skin is warm and dry.      Capillary Refill: Capillary refill takes less than 2 seconds.   Neurological:      General: No focal deficit present.      Mental Status: He is alert and oriented to person, place, and time.   Psychiatric:         Mood and Affect: Mood normal.         Behavior: Behavior normal.         Thought Content: Thought content normal.         Cognition and Memory: Cognition normal.       Assessment & Plan   Diagnoses and all orders for this visit:    1. Chronic left shoulder pain (Primary)  -     HYDROcodone-acetaminophen (NORCO)  MG per tablet; Take 1 tablet by mouth Every 4 (Four) Hours As Needed for Severe Pain.  Dispense: 180 tablet; Refill: 0    2. Chronic pain " syndrome    3. High risk medication use    --- Routine UDS in office today as part of monitoring requirements for controlled substances.  The specimen was viewed and the immunoassay result reviewed and is +OPI (appropriate). This specimen will be sent to [facility] laboratory for confirmation.     --- The patient signed an updated copy of the controlled substance agreement on 9/8/22  --- Refill Hydrocodone. Patient appears stable with current regimen. No adverse effects. Regarding continuation of opioids, there is no evidence of aberrant behavior or any red flags. The patient continues with appropriate response to opioid therapy. ADL's remain intact by self.   --- Follow-up 1 month     ZION REPORT  As part of the patient's treatment plan, I am prescribing controlled substances. The patient has been made aware of appropriate use of such medications, including potential risk of somnolence, limited ability to drive and/or work safely, and the potential for dependence or overdose. It has also been made clear that these medications are for use by this patient only, without concomitant use of alcohol or other substances unless prescribed.     Patient has completed prescribing agreement detailing terms of continued prescribing of controlled substances, including monitoring ZION reports, urine drug screening, and pill counts if necessary. The patient is aware that inappropriate use will results in cessation of prescribing such medications.    As the clinician, I personally reviewed the ZION from 9/8/22 while the patient was in the office today.    History and physical exam exhibit continued safe and appropriate use of controlled substances.    Dictated utilizing Dragon dictation.     This document is intended for medical expert use only. Reading of this document by patients and/or patient's family without participating medical staff guidance may result in misinterpretation and unintended morbidity.   Any  interpretation of such data is the responsibility of the patient and/or family member responsible for the patient in concert with their primary or specialist providers, not to be left for sources of online searches such as [employer], [employer] or similar queries. Relying on these approaches to knowledge may result in misinterpretation, misguided goals of care and even death should patients or family members try recommendations outside of the realm of professional medical care in a supervised way.    Patient remained masked during entire encounter. No cough present. I donned a mask and eye protection throughout entire visit. Prior to donning mask and eye protection, hand hygiene was performed, as well as when it was doffed.  I was closer than 6 feet, but not for an extended period of time. No obvious exposure to any bodily fluids.

## 2022-10-07 ENCOUNTER — OFFICE VISIT (OUTPATIENT)
Dept: PAIN MEDICINE | Facility: CLINIC | Age: 72
End: 2022-10-07

## 2022-10-07 VITALS
TEMPERATURE: 98.2 F | OXYGEN SATURATION: 95 % | SYSTOLIC BLOOD PRESSURE: 130 MMHG | HEART RATE: 78 BPM | WEIGHT: 230 LBS | HEIGHT: 72 IN | BODY MASS INDEX: 31.15 KG/M2 | DIASTOLIC BLOOD PRESSURE: 76 MMHG | RESPIRATION RATE: 20 BRPM

## 2022-10-07 DIAGNOSIS — M25.512 CHRONIC LEFT SHOULDER PAIN: Primary | ICD-10-CM

## 2022-10-07 DIAGNOSIS — Z79.899 HIGH RISK MEDICATION USE: ICD-10-CM

## 2022-10-07 DIAGNOSIS — G89.4 CHRONIC PAIN SYNDROME: ICD-10-CM

## 2022-10-07 DIAGNOSIS — G89.29 CHRONIC LEFT SHOULDER PAIN: Primary | ICD-10-CM

## 2022-10-07 PROCEDURE — 99213 OFFICE O/P EST LOW 20 MIN: CPT

## 2022-10-07 RX ORDER — HYDROCODONE BITARTRATE AND ACETAMINOPHEN 10; 325 MG/1; MG/1
1 TABLET ORAL EVERY 4 HOURS PRN
Qty: 180 TABLET | Refills: 0 | Status: SHIPPED | OUTPATIENT
Start: 2022-10-07 | End: 2022-11-07 | Stop reason: SDUPTHER

## 2022-10-07 NOTE — PROGRESS NOTES
"CHIEF COMPLAINT  Left shoulder pain    Subjective   Gallo Leary is a 72 y.o. male  who presents for follow-up.  He has a history of left shoulder pain. He reports that his pain level has remained unchanged since his last office visit.    Today pain is 5/10VAS in severity. Pain is located in his left shoulder. Describes this pain as nearly continuous ache. Pain is worsened by certain positions and overhead/heavy lifting. Pain improves with medications. He has not tried heat or ice.     Continues with Hydrocodone 10mg 6/day. Denies any side effects from the regimen, including constipation and somnolence. The regimen helps decrease pain by 50%. He reports that pain medications helps \"keep pain at bay and allows me to work\". Notes improvement in activity and function with regimen. ADL's by self. Denies any bowel or bladder changes.     Shoulder Injury   The left shoulder is affected. The incident occurred more than 1 week ago. The injury mechanism was repetitive motion. The quality of the pain is described as aching (continuous). The pain does not radiate. The pain is at a severity of 6/10. Pertinent negatives include no chest pain or numbness. The symptoms are aggravated by movement and overhead lifting. Treatments tried: Hydrocodone.     PEG Assessment   What number best describes your pain on average in the past week?6  What number best describes how, during the past week, pain has interfered with your enjoyment of life?6  What number best describes how, during the past week, pain has interfered with your general activity?  6    The following portions of the patient's history were reviewed and updated as appropriate: allergies, current medications, past family history, past medical history, past social history, past surgical history and problem list.    Review of Systems   Constitutional: Negative for activity change, fatigue and fever.   HENT: Negative for congestion.    Eyes: Negative for visual " "disturbance.   Respiratory: Negative for cough and shortness of breath.    Cardiovascular: Negative for chest pain.   Gastrointestinal: Negative for constipation and diarrhea.   Genitourinary: Negative for difficulty urinating.   Musculoskeletal: Positive for arthralgias (left shoulder).   Neurological: Negative for dizziness, weakness, light-headedness, numbness and headaches.   Psychiatric/Behavioral: Negative for agitation, sleep disturbance and suicidal ideas. The patient is not nervous/anxious.      I have reviewed and confirmed the accuracy of the ROS as documented by the MA/LPN/RN ADIS Rodriguez    Vitals:    10/07/22 1447   BP: 130/76   Pulse: 78   Resp: 20   Temp: 98.2 °F (36.8 °C)   SpO2: 95%   Weight: 104 kg (230 lb)   Height: 182.9 cm (72\")   PainSc:   6   PainLoc: Shoulder  Comment: left     Objective   Physical Exam  Constitutional:       Appearance: Normal appearance.   HENT:      Head: Normocephalic.   Cardiovascular:      Rate and Rhythm: Normal rate and regular rhythm.   Pulmonary:      Effort: Pulmonary effort is normal.      Breath sounds: Normal breath sounds.   Musculoskeletal:      Left shoulder: Tenderness present. Decreased range of motion.      Cervical back: Normal range of motion.   Skin:     General: Skin is warm and dry.      Capillary Refill: Capillary refill takes less than 2 seconds.   Neurological:      General: No focal deficit present.      Mental Status: He is alert and oriented to person, place, and time.   Psychiatric:         Mood and Affect: Mood normal.         Behavior: Behavior normal.         Thought Content: Thought content normal.         Cognition and Memory: Cognition normal.       Assessment & Plan   Diagnoses and all orders for this visit:    1. Chronic left shoulder pain (Primary)    2. Chronic pain syndrome    3. High risk medication use    --- The urine drug screen confirmation from 9/8/22 has been reviewed and the result is appropriate based on patient " history and ZION report  --- The patient signed an updated copy of the controlled substance agreement on 9/8/22  --- Refill Hydrocodone. DNF 10/7/22. Patient appears stable with current regimen. No adverse effects. Regarding continuation of opioids, there is no evidence of aberrant behavior or any red flags. The patient continues with appropriate response to opioid therapy. ADL's remain intact by self.   --- Follow-up 1 month     ZION REPORT  As part of the patient's treatment plan, I am prescribing controlled substances. The patient has been made aware of appropriate use of such medications, including potential risk of somnolence, limited ability to drive and/or work safely, and the potential for dependence or overdose. It has also been made clear that these medications are for use by this patient only, without concomitant use of alcohol or other substances unless prescribed.     Patient has completed prescribing agreement detailing terms of continued prescribing of controlled substances, including monitoring ZION reports, urine drug screening, and pill counts if necessary. The patient is aware that inappropriate use will results in cessation of prescribing such medications.    As the clinician, I personally reviewed the ZION from 10/7/22 while the patient was in the office today.    History and physical exam exhibit continued safe and appropriate use of controlled substances.    Dictated utilizing Dragon dictation.     This document is intended for medical expert use only. Reading of this document by patients and/or patient's family without participating medical staff guidance may result in misinterpretation and unintended morbidity.   Any interpretation of such data is the responsibility of the patient and/or family member responsible for the patient in concert with their primary or specialist providers, not to be left for sources of online searches such as [employer], [employer] or similar queries. Relying on  these approaches to knowledge may result in misinterpretation, misguided goals of care and even death should patients or family members try recommendations outside of the realm of professional medical care in a supervised way.    Patient remained masked during entire encounter. No cough present. I donned a mask and eye protection throughout entire visit. Prior to donning mask and eye protection, hand hygiene was performed, as well as when it was doffed.  I was closer than 6 feet, but not for an extended period of time. No obvious exposure to any bodily fluids.

## 2022-11-07 ENCOUNTER — OFFICE VISIT (OUTPATIENT)
Dept: PAIN MEDICINE | Facility: CLINIC | Age: 72
End: 2022-11-07

## 2022-11-07 VITALS
SYSTOLIC BLOOD PRESSURE: 145 MMHG | HEIGHT: 72 IN | DIASTOLIC BLOOD PRESSURE: 82 MMHG | OXYGEN SATURATION: 96 % | WEIGHT: 224 LBS | TEMPERATURE: 96.8 F | BODY MASS INDEX: 30.34 KG/M2 | RESPIRATION RATE: 18 BRPM

## 2022-11-07 DIAGNOSIS — G89.29 CHRONIC LEFT SHOULDER PAIN: Primary | ICD-10-CM

## 2022-11-07 DIAGNOSIS — Z79.899 HIGH RISK MEDICATION USE: ICD-10-CM

## 2022-11-07 DIAGNOSIS — G89.4 CHRONIC PAIN SYNDROME: ICD-10-CM

## 2022-11-07 DIAGNOSIS — M25.512 CHRONIC LEFT SHOULDER PAIN: Primary | ICD-10-CM

## 2022-11-07 PROCEDURE — 99213 OFFICE O/P EST LOW 20 MIN: CPT

## 2022-11-07 RX ORDER — HYDROCODONE BITARTRATE AND ACETAMINOPHEN 10; 325 MG/1; MG/1
1 TABLET ORAL EVERY 4 HOURS PRN
Qty: 180 TABLET | Refills: 0 | Status: SHIPPED | OUTPATIENT
Start: 2022-11-07 | End: 2022-12-06 | Stop reason: SDUPTHER

## 2022-11-07 NOTE — PROGRESS NOTES
"CHIEF COMPLAINT  Left shoulder pain    Subjective   Gallo Leary is a 72 y.o. male  who presents for follow-up.  He has a history of left shoulder pain. He reports that his pain level has remained unchanged since his last office visit.     Today pain is 6/10VAS in severity. Pain is located in his left shoulder. Describes this pain as nearly continuous ache. Pain is worsened by certain positions and overhead/heavy lifting. Pain improves with medications. He has not tried heat or ice.     Continues with Hydrocodone 10mg 6/day. Denies any side effects from the regimen, including constipation and somnolence. The regimen helps decrease pain by 50%. He reports that pain medications helps \"keep pain at bay and allows me to work\". Notes improvement in activity and function with regimen. ADL's by self. Denies any bowel or bladder changes.     Shoulder Injury   The left shoulder is affected. The incident occurred more than 1 week ago. The injury mechanism was repetitive motion. The quality of the pain is described as aching (continuous). The pain does not radiate. The pain is at a severity of 6/10 (pain is unchanged since last office visit). Pertinent negatives include no chest pain or numbness. The symptoms are aggravated by movement and overhead lifting. He has tried rest, immobilization, heat and ice (Hydrocodone) for the symptoms. The treatment provided mild relief.      PEG Assessment   What number best describes your pain on average in the past week?5  What number best describes how, during the past week, pain has interfered with your enjoyment of life?0  What number best describes how, during the past week, pain has interfered with your general activity?  0    The following portions of the patient's history were reviewed and updated as appropriate: allergies, current medications, past family history, past medical history, past social history, past surgical history and problem list.    Review of Systems " "  Constitutional: Negative for fever.   HENT: Negative for congestion.    Eyes: Negative for visual disturbance.   Respiratory: Negative for shortness of breath.    Cardiovascular: Negative for chest pain.   Gastrointestinal: Negative for constipation and diarrhea.   Genitourinary: Negative for difficulty urinating.   Musculoskeletal: Negative for joint swelling.   Neurological: Negative for numbness.   Psychiatric/Behavioral: Negative for sleep disturbance.     I have reviewed and confirmed the accuracy of the ROS as documented by the MA/LPN/RN ADIS Rodriguez    Vitals:    11/07/22 1553   BP: 145/82   Resp: 18   Temp: 96.8 °F (36 °C)   SpO2: 96%   Weight: 102 kg (224 lb)   Height: 182.9 cm (72.01\")   PainSc:   6   PainLoc: Shoulder     Objective   Physical Exam  Constitutional:       Appearance: Normal appearance.   HENT:      Head: Normocephalic.   Cardiovascular:      Rate and Rhythm: Normal rate and regular rhythm.   Pulmonary:      Effort: Pulmonary effort is normal.      Breath sounds: Normal breath sounds.   Musculoskeletal:      Left shoulder: Tenderness present. Decreased range of motion.      Cervical back: Normal range of motion.   Skin:     General: Skin is warm and dry.      Capillary Refill: Capillary refill takes less than 2 seconds.   Neurological:      General: No focal deficit present.      Mental Status: He is alert and oriented to person, place, and time.   Psychiatric:         Mood and Affect: Mood normal.         Behavior: Behavior normal.         Thought Content: Thought content normal.         Cognition and Memory: Cognition normal.       Assessment & Plan   Diagnoses and all orders for this visit:    1. Chronic left shoulder pain (Primary)  -     HYDROcodone-acetaminophen (NORCO)  MG per tablet; Take 1 tablet by mouth Every 4 (Four) Hours As Needed for Severe Pain. 30 day supply  Dispense: 180 tablet; Refill: 0    2. Chronic pain syndrome    3. High risk medication use    --- " The urine drug screen confirmation from 9/8/22 has been reviewed and the result is appropriate based on patient history and ZION report  --- The patient signed an updated copy of the controlled substance agreement on 9/8/22  --- Refill Hydrocodone. Patient appears stable with current regimen. No adverse effects. Regarding continuation of opioids, there is no evidence of aberrant behavior or any red flags. The patient continues with appropriate response to opioid therapy. ADL's remain intact by self.   --- Follow-up 1 month     ZION REPORT  As part of the patient's treatment plan, I am prescribing controlled substances. The patient has been made aware of appropriate use of such medications, including potential risk of somnolence, limited ability to drive and/or work safely, and the potential for dependence or overdose. It has also been made clear that these medications are for use by this patient only, without concomitant use of alcohol or other substances unless prescribed.     Patient has completed prescribing agreement detailing terms of continued prescribing of controlled substances, including monitoring ZION reports, urine drug screening, and pill counts if necessary. The patient is aware that inappropriate use will results in cessation of prescribing such medications.    As the clinician, I personally reviewed the ZION from 11/7/22 while the patient was in the office today.    History and physical exam exhibit continued safe and appropriate use of controlled substances.    Dictated utilizing Dragon dictation.     This document is intended for medical expert use only. Reading of this document by patients and/or patient's family without participating medical staff guidance may result in misinterpretation and unintended morbidity.   Any interpretation of such data is the responsibility of the patient and/or family member responsible for the patient in concert with their primary or specialist providers, not  to be left for sources of online searches such as [employer], [employer] or similar queries. Relying on these approaches to knowledge may result in misinterpretation, misguided goals of care and even death should patients or family members try recommendations outside of the realm of professional medical care in a supervised way.    Patient remained masked during entire encounter. No cough present. I donned a mask and eye protection throughout entire visit. Prior to donning mask and eye protection, hand hygiene was performed, as well as when it was doffed.  I was closer than 6 feet, but not for an extended period of time. No obvious exposure to any bodily fluids.

## 2022-12-06 ENCOUNTER — OFFICE VISIT (OUTPATIENT)
Dept: PAIN MEDICINE | Facility: CLINIC | Age: 72
End: 2022-12-06

## 2022-12-06 VITALS
BODY MASS INDEX: 30.48 KG/M2 | DIASTOLIC BLOOD PRESSURE: 73 MMHG | RESPIRATION RATE: 20 BRPM | HEART RATE: 77 BPM | TEMPERATURE: 98 F | OXYGEN SATURATION: 96 % | WEIGHT: 225 LBS | HEIGHT: 72 IN | SYSTOLIC BLOOD PRESSURE: 108 MMHG

## 2022-12-06 DIAGNOSIS — Z79.899 HIGH RISK MEDICATION USE: ICD-10-CM

## 2022-12-06 DIAGNOSIS — G89.29 CHRONIC LEFT SHOULDER PAIN: Primary | ICD-10-CM

## 2022-12-06 DIAGNOSIS — G89.4 CHRONIC PAIN SYNDROME: ICD-10-CM

## 2022-12-06 DIAGNOSIS — M25.512 CHRONIC LEFT SHOULDER PAIN: Primary | ICD-10-CM

## 2022-12-06 PROCEDURE — 99213 OFFICE O/P EST LOW 20 MIN: CPT

## 2022-12-06 RX ORDER — HYDROCODONE BITARTRATE AND ACETAMINOPHEN 10; 325 MG/1; MG/1
1 TABLET ORAL EVERY 4 HOURS PRN
Qty: 180 TABLET | Refills: 0 | Status: SHIPPED | OUTPATIENT
Start: 2022-12-06 | End: 2023-01-06 | Stop reason: SDUPTHER

## 2022-12-06 NOTE — PROGRESS NOTES
"CHIEF COMPLAINT  Left shoulder pain    Subjective   Gallo Leary is a 72 y.o. male  who presents for follow-up.  He has a history of left shoulder pain. He reports that his pain level has slightly improved since his last office visit.     Today pain is 3/10VAS in severity. Pain is located in his left shoulder. Describes this pain as nearly continuous ache. Pain is worsened by certain positions and overhead/heavy lifting. Pain improves with medications. He has not tried heat or ice.     Continues with Hydrocodone 10mg 6/day. Denies any side effects from the regimen, including constipation and somnolence. The regimen helps decrease pain by 50%. He reports that pain medications helps \"keep pain at bay and allows me to work\". Notes improvement in activity and function with regimen. ADL's by self. Denies any bowel or bladder changes.     Shoulder Injury   The left shoulder is affected. The incident occurred more than 1 week ago. The injury mechanism was repetitive motion. The quality of the pain is described as aching (continuous). The pain does not radiate. The pain is at a severity of 3/10 (pain is unchanged since last office visit). Pertinent negatives include no chest pain or numbness. The symptoms are aggravated by movement and overhead lifting. He has tried rest, immobilization, heat and ice (Hydrocodone) for the symptoms. The treatment provided mild relief.      PEG Assessment   What number best describes your pain on average in the past week?4  What number best describes how, during the past week, pain has interfered with your enjoyment of life?4  What number best describes how, during the past week, pain has interfered with your general activity?  4    The following portions of the patient's history were reviewed and updated as appropriate: allergies, current medications, past family history, past medical history, past social history, past surgical history and problem list.    Review of Systems " "  Constitutional: Negative for activity change, fatigue and fever.   HENT: Negative for congestion.    Eyes: Negative for visual disturbance.   Respiratory: Negative for cough and shortness of breath.    Cardiovascular: Negative for chest pain.   Gastrointestinal: Negative for constipation and diarrhea.   Genitourinary: Negative for difficulty urinating.   Musculoskeletal: Positive for arthralgias (left shoulder).   Neurological: Negative for dizziness, weakness, light-headedness, numbness and headaches.   Psychiatric/Behavioral: Negative for agitation, sleep disturbance and suicidal ideas. The patient is not nervous/anxious.      I have reviewed and confirmed the accuracy of the ROS as documented by the MA/LPN/RN ADIS Rodriguez    Vitals:    12/06/22 1513   BP: 108/73   Pulse: 77   Resp: 20   Temp: 98 °F (36.7 °C)   SpO2: 96%   Weight: 102 kg (225 lb)   Height: 182.9 cm (72.01\")   PainSc:   3   PainLoc: Shoulder  Comment: left     Objective   Physical Exam  Constitutional:       Appearance: Normal appearance.   HENT:      Head: Normocephalic.   Cardiovascular:      Rate and Rhythm: Normal rate and regular rhythm.   Pulmonary:      Effort: Pulmonary effort is normal.      Breath sounds: Normal breath sounds.   Musculoskeletal:      Left shoulder: Tenderness and crepitus present. Decreased range of motion.      Cervical back: Normal range of motion.   Skin:     General: Skin is warm and dry.      Capillary Refill: Capillary refill takes less than 2 seconds.   Neurological:      General: No focal deficit present.      Mental Status: He is alert and oriented to person, place, and time.   Psychiatric:         Mood and Affect: Mood normal.         Behavior: Behavior normal.         Thought Content: Thought content normal.         Cognition and Memory: Cognition normal.       Assessment & Plan   Diagnoses and all orders for this visit:    1. Chronic left shoulder pain (Primary)  -     HYDROcodone-acetaminophen " (NORCO)  MG per tablet; Take 1 tablet by mouth Every 4 (Four) Hours As Needed for Severe Pain. 30 day supply  Dispense: 180 tablet; Refill: 0    2. Chronic pain syndrome    3. High risk medication use    --- The urine drug screen confirmation from 9/8/22 has been reviewed and the result is appropriate based on patient history and ZION report  --- The patient signed an updated copy of the controlled substance agreement on 9/8/22  --- Refill Hydrocodone. Ok to fill today. Will date next refill for 1/6/23. Patient appears stable with current regimen. No adverse effects. Regarding continuation of opioids, there is no evidence of aberrant behavior or any red flags.  The patient continues with appropriate response to opioid therapy. ADL's remain intact by self.   --- Follow-up 1 month     ZOIN REPORT  As part of the patient's treatment plan, I am prescribing controlled substances. The patient has been made aware of appropriate use of such medications, including potential risk of somnolence, limited ability to drive and/or work safely, and the potential for dependence or overdose. It has also been made clear that these medications are for use by this patient only, without concomitant use of alcohol or other substances unless prescribed.     Patient has completed prescribing agreement detailing terms of continued prescribing of controlled substances, including monitoring ZION reports, urine drug screening, and pill counts if necessary. The patient is aware that inappropriate use will results in cessation of prescribing such medications.    As the clinician, I personally reviewed the ZION from 12/6/22 while the patient was in the office today.    History and physical exam exhibit continued safe and appropriate use of controlled substances.    Dictated utilizing Dragon dictation.     Patient remained masked during entire encounter. No cough present. I donned a mask and eye protection throughout entire visit.  Prior to donning mask and eye protection, hand hygiene was performed, as well as when it was doffed.  I was closer than 6 feet, but not for an extended period of time. No obvious exposure to any bodily fluids.

## 2023-01-06 ENCOUNTER — OFFICE VISIT (OUTPATIENT)
Dept: PAIN MEDICINE | Facility: CLINIC | Age: 73
End: 2023-01-06
Payer: COMMERCIAL

## 2023-01-06 VITALS
HEART RATE: 85 BPM | HEIGHT: 72 IN | WEIGHT: 228 LBS | RESPIRATION RATE: 18 BRPM | BODY MASS INDEX: 30.88 KG/M2 | DIASTOLIC BLOOD PRESSURE: 76 MMHG | OXYGEN SATURATION: 95 % | SYSTOLIC BLOOD PRESSURE: 130 MMHG | TEMPERATURE: 97.5 F

## 2023-01-06 DIAGNOSIS — Z79.899 HIGH RISK MEDICATION USE: ICD-10-CM

## 2023-01-06 DIAGNOSIS — G89.4 CHRONIC PAIN SYNDROME: ICD-10-CM

## 2023-01-06 DIAGNOSIS — G89.29 CHRONIC LEFT SHOULDER PAIN: Primary | ICD-10-CM

## 2023-01-06 DIAGNOSIS — M25.512 CHRONIC LEFT SHOULDER PAIN: Primary | ICD-10-CM

## 2023-01-06 PROCEDURE — 99213 OFFICE O/P EST LOW 20 MIN: CPT

## 2023-01-06 RX ORDER — HYDROCODONE BITARTRATE AND ACETAMINOPHEN 10; 325 MG/1; MG/1
1 TABLET ORAL EVERY 4 HOURS PRN
Qty: 180 TABLET | Refills: 0 | Status: SHIPPED | OUTPATIENT
Start: 2023-01-06 | End: 2023-02-06 | Stop reason: SDUPTHER

## 2023-01-06 NOTE — PROGRESS NOTES
CHIEF COMPLAINT  Left shoulder pain    Subjective   Gallo Leary is a 72 y.o. male  who presents for follow-up.  He has a history of left shoulder pain. He reports that his pain level has remained consistent since his last office visit.     Today pain is 5/10VAS in severity. Pain is located in his left shoulder. Describes this pain as nearly continuous ache. Pain is worsened by certain positions and overhead/heavy lifting. Pain improves with medications. He has not tried heat or ice.     Continues with Hydrocodone 10mg 6/day. Denies any side effects from the regimen, including constipation and somnolence. The regimen helps decrease pain by 50%. He reports that pain medications helps \"keep pain at bay and allows me to work\". Notes improvement in activity and function with regimen. ADL's by self. Denies any bowel or bladder changes.     Shoulder Injury   The left shoulder is affected. The incident occurred more than 1 week ago. The injury mechanism was repetitive motion. The quality of the pain is described as aching (continuous). The pain does not radiate. The pain is at a severity of 5/10 (pain is unchanged since last office visit). The pain is moderate. Pertinent negatives include no chest pain or numbness. The symptoms are aggravated by movement and overhead lifting. He has tried rest, immobilization, heat and ice (Hydrocodone) for the symptoms. The treatment provided mild relief.      PEG Assessment   What number best describes your pain on average in the past week?4-6  What number best describes how, during the past week, pain has interfered with your enjoyment of life?0  What number best describes how, during the past week, pain has interfered with your general activity?  0    The following portions of the patient's history were reviewed and updated as appropriate: allergies, current medications, past family history, past medical history, past social history, past surgical history and problem  list.    Review of Systems   Constitutional: Negative for fever.   HENT: Negative for congestion.    Eyes: Negative for visual disturbance.   Respiratory: Negative for shortness of breath.    Cardiovascular: Negative for chest pain.   Gastrointestinal: Negative for constipation and diarrhea.   Genitourinary: Negative for difficulty urinating and dysuria.   Musculoskeletal: Negative for joint swelling.   Neurological: Negative for light-headedness, numbness and headaches.   Psychiatric/Behavioral: Negative for sleep disturbance and suicidal ideas.     I have reviewed and confirmed the accuracy of the ROS as documented by the MA/LPN/RN ADIS Rodriguez    Vitals:    01/06/23 1419   BP: 130/76   Pulse: 85   Resp: 18   Temp: 97.5 °F (36.4 °C)   SpO2: 95%   Weight: 103 kg (228 lb)   Height: 182.9 cm (72.01\")   PainSc:   5   PainLoc: Shoulder     Objective   Physical Exam  Constitutional:       Appearance: Normal appearance.   HENT:      Head: Normocephalic.   Cardiovascular:      Rate and Rhythm: Normal rate and regular rhythm.   Pulmonary:      Effort: Pulmonary effort is normal.      Breath sounds: Normal breath sounds.   Musculoskeletal:      Left shoulder: Tenderness and crepitus present. Decreased range of motion.      Cervical back: Normal range of motion.   Skin:     General: Skin is warm and dry.      Capillary Refill: Capillary refill takes less than 2 seconds.   Neurological:      General: No focal deficit present.      Mental Status: He is alert and oriented to person, place, and time.   Psychiatric:         Mood and Affect: Mood normal.         Behavior: Behavior normal.         Thought Content: Thought content normal.         Cognition and Memory: Cognition normal.       Assessment & Plan   Diagnoses and all orders for this visit:    1. Chronic left shoulder pain (Primary)  -     HYDROcodone-acetaminophen (NORCO)  MG per tablet; Take 1 tablet by mouth Every 4 (Four) Hours As Needed for Severe  Pain. 30 day supply  Dispense: 180 tablet; Refill: 0    2. Chronic pain syndrome    3. High risk medication use    --- The urine drug screen confirmation from 9/8/22 has been reviewed and the result is appropriate based on patient history and ZION report  --- The patient signed an updated copy of the controlled substance agreement on 9/8/22  --- Refill Hydrocodone. Patient appears stable with current regimen. No adverse effects. Regarding continuation of opioids, there is no evidence of aberrant behavior or any red flags.  The patient continues with appropriate response to opioid therapy. ADL's remain intact by self.   --- Follow-up 1 month     ZION REPORT  As part of the patient's treatment plan, I am prescribing controlled substances. The patient has been made aware of appropriate use of such medications, including potential risk of somnolence, limited ability to drive and/or work safely, and the potential for dependence or overdose. It has also been made clear that these medications are for use by this patient only, without concomitant use of alcohol or other substances unless prescribed.     Patient has completed prescribing agreement detailing terms of continued prescribing of controlled substances, including monitoring ZION reports, urine drug screening, and pill counts if necessary. The patient is aware that inappropriate use will results in cessation of prescribing such medications.    As the clinician, I personally reviewed the ZION from 1/6/23 while the patient was in the office today.    History and physical exam exhibit continued safe and appropriate use of controlled substances.    Dictated utilizing Dragon dictation.     Patient remained masked during entire encounter. No cough present. I donned a mask and eye protection throughout entire visit. Prior to donning mask and eye protection, hand hygiene was performed, as well as when it was doffed.  I was closer than 6 feet, but not for an extended  period of time. No obvious exposure to any bodily fluids.

## 2023-02-06 ENCOUNTER — OFFICE VISIT (OUTPATIENT)
Dept: PAIN MEDICINE | Facility: CLINIC | Age: 73
End: 2023-02-06
Payer: COMMERCIAL

## 2023-02-06 VITALS
DIASTOLIC BLOOD PRESSURE: 89 MMHG | RESPIRATION RATE: 18 BRPM | SYSTOLIC BLOOD PRESSURE: 130 MMHG | WEIGHT: 229.8 LBS | HEIGHT: 72 IN | HEART RATE: 83 BPM | BODY MASS INDEX: 31.13 KG/M2 | TEMPERATURE: 98.6 F

## 2023-02-06 DIAGNOSIS — Z79.899 HIGH RISK MEDICATION USE: ICD-10-CM

## 2023-02-06 DIAGNOSIS — M25.512 CHRONIC LEFT SHOULDER PAIN: Primary | ICD-10-CM

## 2023-02-06 DIAGNOSIS — G89.4 CHRONIC PAIN SYNDROME: ICD-10-CM

## 2023-02-06 DIAGNOSIS — G89.29 CHRONIC LEFT SHOULDER PAIN: Primary | ICD-10-CM

## 2023-02-06 PROCEDURE — 99213 OFFICE O/P EST LOW 20 MIN: CPT

## 2023-02-06 RX ORDER — HYDROCODONE BITARTRATE AND ACETAMINOPHEN 10; 325 MG/1; MG/1
1 TABLET ORAL EVERY 4 HOURS PRN
Qty: 180 TABLET | Refills: 0 | Status: SHIPPED | OUTPATIENT
Start: 2023-02-06 | End: 2023-03-06 | Stop reason: SDUPTHER

## 2023-02-06 NOTE — PROGRESS NOTES
"CHIEF COMPLAINT  Left shoulder pain    Subjective   Gallo Leary is a 72 y.o. male  who presents for follow-up.  He has a history of left shoulder pain. He reports that his pain level has remained consistent since his last office visit.     Today pain is 6/10VAS in severity. Pain is located in his left shoulder. Describes this pain as nearly continuous ache. Pain is worsened by certain positions and overhead/heavy lifting. Pain improves with medications. He has not tried heat or ice.     Continues with Hydrocodone 10mg 6/day. Denies any side effects from the regimen, including constipation and somnolence. The regimen helps decrease pain by 50%. He reports that pain medications helps \"keep pain at bay and allows me to work\". Notes improvement in activity and function with regimen. ADL's by self. Denies any bowel or bladder changes.     Shoulder Injury   The left shoulder is affected. The incident occurred more than 1 week ago. The injury mechanism was repetitive motion. The quality of the pain is described as aching (continuous). The pain does not radiate. The pain is at a severity of 6/10 (pain is unchanged since last office visit). The pain is moderate. Pertinent negatives include no chest pain or numbness. The symptoms are aggravated by movement and overhead lifting. He has tried rest, immobilization, heat and ice (Hydrocodone) for the symptoms. The treatment provided mild relief.      PEG Assessment   What number best describes your pain on average in the past week?3-5  What number best describes how, during the past week, pain has interfered with your enjoyment of life?0  What number best describes how, during the past week, pain has interfered with your general activity?  0    The following portions of the patient's history were reviewed and updated as appropriate: allergies, current medications, past family history, past medical history, past social history, past surgical history and problem " "list.    Review of Systems   Constitutional: Negative for fever.   HENT: Negative for congestion.    Eyes: Negative for visual disturbance.   Respiratory: Negative for shortness of breath.    Cardiovascular: Negative for chest pain.   Gastrointestinal: Negative for constipation and diarrhea.   Genitourinary: Negative for difficulty urinating and dysuria.   Musculoskeletal: Negative for back pain and neck pain.   Neurological: Negative for numbness.   Psychiatric/Behavioral: Negative for sleep disturbance.     I have reviewed and confirmed the accuracy of the ROS as documented by the MA/LPN/RN ADIS Rodriguez    Vitals:    02/06/23 1456   BP: 130/89   Pulse: 83   Resp: 18   Temp: 98.6 °F (37 °C)   Weight: 104 kg (229 lb 12.8 oz)   Height: 182.9 cm (72.01\")   PainSc:   6   PainLoc: Shoulder     Objective   Physical Exam  Constitutional:       Appearance: Normal appearance.   HENT:      Head: Normocephalic.   Cardiovascular:      Rate and Rhythm: Normal rate and regular rhythm.   Pulmonary:      Effort: Pulmonary effort is normal.      Breath sounds: Normal breath sounds.   Musculoskeletal:      Left shoulder: Tenderness and crepitus present. Decreased range of motion.      Cervical back: Normal range of motion.   Skin:     General: Skin is warm and dry.      Capillary Refill: Capillary refill takes less than 2 seconds.   Neurological:      General: No focal deficit present.      Mental Status: He is alert and oriented to person, place, and time.   Psychiatric:         Mood and Affect: Mood normal.         Behavior: Behavior normal.         Thought Content: Thought content normal.         Cognition and Memory: Cognition normal.       Assessment & Plan   Diagnoses and all orders for this visit:    1. Chronic left shoulder pain (Primary)  -     HYDROcodone-acetaminophen (NORCO)  MG per tablet; Take 1 tablet by mouth Every 4 (Four) Hours As Needed for Severe Pain. 30 day supply  Dispense: 180 tablet; Refill: " 0    2. Chronic pain syndrome    3. High risk medication use    --- The urine drug screen confirmation from 9/8/22 has been reviewed and the result is appropriate based on patient history and ZION report  --- The patient signed an updated copy of the controlled substance agreement on 9/8/22  --- Refill Hydrocodone. Patient appears stable with current regimen. No adverse effects. Regarding continuation of opioids, there is no evidence of aberrant behavior or any red flags.  The patient continues with appropriate response to opioid therapy. ADL's remain intact by self.   --- Follow-up 1 month     ZION REPORT  As part of the patient's treatment plan, I am prescribing controlled substances. The patient has been made aware of appropriate use of such medications, including potential risk of somnolence, limited ability to drive and/or work safely, and the potential for dependence or overdose. It has also been made clear that these medications are for use by this patient only, without concomitant use of alcohol or other substances unless prescribed.     Patient has completed prescribing agreement detailing terms of continued prescribing of controlled substances, including monitoring ZION reports, urine drug screening, and pill counts if necessary. The patient is aware that inappropriate use will results in cessation of prescribing such medications.    As the clinician, I personally reviewed the ZION from 2/6/23 while the patient was in the office today.    History and physical exam exhibit continued safe and appropriate use of controlled substances.    Dictated utilizing Dragon dictation.     Patient remained masked during entire encounter. No cough present. I donned a mask and eye protection throughout entire visit. Prior to donning mask and eye protection, hand hygiene was performed, as well as when it was doffed.  I was closer than 6 feet, but not for an extended period of time. No obvious exposure to any bodily  fluids.

## 2023-03-06 ENCOUNTER — OFFICE VISIT (OUTPATIENT)
Dept: PAIN MEDICINE | Facility: CLINIC | Age: 73
End: 2023-03-06
Payer: COMMERCIAL

## 2023-03-06 VITALS
DIASTOLIC BLOOD PRESSURE: 78 MMHG | TEMPERATURE: 98.7 F | RESPIRATION RATE: 18 BRPM | BODY MASS INDEX: 31.46 KG/M2 | SYSTOLIC BLOOD PRESSURE: 133 MMHG | HEIGHT: 72 IN | HEART RATE: 95 BPM | OXYGEN SATURATION: 96 % | WEIGHT: 232.3 LBS

## 2023-03-06 DIAGNOSIS — G89.29 CHRONIC LEFT SHOULDER PAIN: Primary | ICD-10-CM

## 2023-03-06 DIAGNOSIS — Z79.899 HIGH RISK MEDICATION USE: ICD-10-CM

## 2023-03-06 DIAGNOSIS — G89.4 CHRONIC PAIN SYNDROME: ICD-10-CM

## 2023-03-06 DIAGNOSIS — M25.512 CHRONIC LEFT SHOULDER PAIN: Primary | ICD-10-CM

## 2023-03-06 PROCEDURE — 99213 OFFICE O/P EST LOW 20 MIN: CPT

## 2023-03-06 PROCEDURE — 80305 DRUG TEST PRSMV DIR OPT OBS: CPT

## 2023-03-06 RX ORDER — HYDROCODONE BITARTRATE AND ACETAMINOPHEN 10; 325 MG/1; MG/1
1 TABLET ORAL EVERY 4 HOURS PRN
Qty: 180 TABLET | Refills: 0 | Status: SHIPPED | OUTPATIENT
Start: 2023-03-06 | End: 2023-03-08 | Stop reason: SDUPTHER

## 2023-03-06 NOTE — PROGRESS NOTES
"CHIEF COMPLAINT  Left shoulder pain    Subjective   Gallo Leary is a 73 y.o. male  who presents for follow-up.  He has a history of left shoulder pain. He reports that his pain has remained consistent since his last office visit.    Today pain is 6/10VAS in severity. Pain is located in his left shoulder. Describes this pain as nearly continuous ache. Pain is worsened by certain positions and overhead/heavy lifting. Pain improves with medications. He has not tried heat or ice.     Continues with Hydrocodone 10mg 6/day. Denies any side effects from the regimen, including constipation and somnolence. The regimen helps decrease pain by 50%. He reports that pain medications helps \"keep pain at bay and allows me to work\". Notes improvement in activity and function with regimen. ADL's by self. Denies any bowel or bladder changes.     Shoulder Injury   The left shoulder is affected. The incident occurred more than 1 week ago. The injury mechanism was repetitive motion. The quality of the pain is described as aching (continuous). The pain does not radiate. The pain is at a severity of 6/10 (pain is unchanged since last office visit). The pain is moderate. Pertinent negatives include no chest pain or numbness. The symptoms are aggravated by movement and overhead lifting. He has tried rest, immobilization, heat and ice (Hydrocodone) for the symptoms. The treatment provided mild relief.      PEG Assessment   What number best describes your pain on average in the past week?6  What number best describes how, during the past week, pain has interfered with your enjoyment of life?2  What number best describes how, during the past week, pain has interfered with your general activity?  5    The following portions of the patient's history were reviewed and updated as appropriate: allergies, current medications, past family history, past medical history, past social history, past surgical history and problem list.  Review of " "Systems   Respiratory: Negative for shortness of breath.    Cardiovascular: Negative for chest pain.   Gastrointestinal: Negative for constipation and diarrhea.   Genitourinary: Negative for difficulty urinating.   Musculoskeletal: Positive for arthralgias (left shoulder).   Neurological: Negative for weakness and numbness.   Psychiatric/Behavioral: Positive for sleep disturbance. Negative for suicidal ideas. The patient is not nervous/anxious.      I have reviewed and confirmed the accuracy of the ROS as documented by the MA/LPN/RN ADIS Rodriguez    Vitals:    03/06/23 1435   BP: 133/78   Pulse: 95   Resp: 18   Temp: 98.7 °F (37.1 °C)   SpO2: 96%   Weight: 105 kg (232 lb 4.8 oz)   Height: 182.9 cm (72.01\")   PainSc:   6   PainLoc: Shoulder     Objective   Physical Exam  Constitutional:       Appearance: Normal appearance.   HENT:      Head: Normocephalic.   Cardiovascular:      Rate and Rhythm: Normal rate and regular rhythm.   Pulmonary:      Effort: Pulmonary effort is normal.      Breath sounds: Normal breath sounds.   Musculoskeletal:      Left shoulder: Tenderness and crepitus present. Decreased range of motion.      Cervical back: Normal range of motion.   Skin:     General: Skin is warm and dry.      Capillary Refill: Capillary refill takes less than 2 seconds.   Neurological:      General: No focal deficit present.      Mental Status: He is alert and oriented to person, place, and time.   Psychiatric:         Mood and Affect: Mood normal.         Behavior: Behavior normal.         Thought Content: Thought content normal.         Cognition and Memory: Cognition normal.       Assessment & Plan   Diagnoses and all orders for this visit:    1. Chronic left shoulder pain (Primary)  -     HYDROcodone-acetaminophen (NORCO)  MG per tablet; Take 1 tablet by mouth Every 4 (Four) Hours As Needed for Severe Pain. 30 day supply  Dispense: 180 tablet; Refill: 0    2. Chronic pain syndrome    3. High risk " medication use    --- Routine UDS in office today as part of monitoring requirements for controlled substances.  The specimen was viewed and the immunoassay result reviewed and is +OPI (approprirate). This specimen will be sent to [facility] laboratory for confirmation.     --- The patient signed an updated copy of the controlled substance agreement on 9/8/22  --- Refill Hydrocodone. DNF 3/8/23. Patient appears stable with current regimen. No adverse effects. Regarding continuation of opioids, there is no evidence of aberrant behavior or any red flags.  The patient continues with appropriate response to opioid therapy. ADL's remain intact by self.   --- Follow-up 1 month     ZION REPORT  As part of the patient's treatment plan, I am prescribing controlled substances. The patient has been made aware of appropriate use of such medications, including potential risk of somnolence, limited ability to drive and/or work safely, and the potential for dependence or overdose. It has also been made clear that these medications are for use by this patient only, without concomitant use of alcohol or other substances unless prescribed.     Patient has completed prescribing agreement detailing terms of continued prescribing of controlled substances, including monitoring ZION reports, urine drug screening, and pill counts if necessary. The patient is aware that inappropriate use will results in cessation of prescribing such medications.    As the clinician, I personally reviewed the ZION from 3/6/23 while the patient was in the office today.    History and physical exam exhibit continued safe and appropriate use of controlled substances.    Dictated utilizing Dragon dictation.     Patient remained masked during entire encounter. No cough present. I donned a mask and eye protection throughout entire visit. Prior to donning mask and eye protection, hand hygiene was performed, as well as when it was doffed.  I was closer than 6  feet, but not for an extended period of time. No obvious exposure to any bodily fluids.

## 2023-03-08 ENCOUNTER — TELEPHONE (OUTPATIENT)
Dept: PAIN MEDICINE | Facility: CLINIC | Age: 73
End: 2023-03-08

## 2023-03-08 DIAGNOSIS — M25.512 CHRONIC LEFT SHOULDER PAIN: ICD-10-CM

## 2023-03-08 DIAGNOSIS — G89.29 CHRONIC LEFT SHOULDER PAIN: ICD-10-CM

## 2023-03-08 RX ORDER — HYDROCODONE BITARTRATE AND ACETAMINOPHEN 10; 325 MG/1; MG/1
1 TABLET ORAL EVERY 4 HOURS PRN
Qty: 180 TABLET | Refills: 0 | Status: SHIPPED | OUTPATIENT
Start: 2023-03-08 | End: 2023-04-06 | Stop reason: SDUPTHER

## 2023-03-08 NOTE — TELEPHONE ENCOUNTER
Caller: PATIENT    Relationship: SELF     Best call back number: [phone]    Requested Prescriptions: HYDROCODONE 10 MG     Pharmacy where request should be sent:  [facility] [address] IN [place], [place] AND PHONE NUMBER: [phone]     Additional details provided by patient: PATIENT WAS CALLING TO REQUEST HIS PRESCRIPTION OF HYDROCODONE 10 MG TO BE SENT TO A NEW PHARMACY DUE TO [facility] CURRENTLY BEING OUT OF THIS MEDICATION. PATIENT WOULD LIKE THIS PRESCRIPTION TO BE SENT TO THE [facility]. PATIENT STATED SHE HAS ENOUGH MEDICATION TO LAST HIM UNTIL TONIGHT. THANK YOU!    Does the patient have less than a 3 day supply:  [x] Yes  [] No    Would you like a call back once the refill request has been completed: [] Yes [x] No    If the office needs to give you a call back, can they leave a voicemail: [] Yes [x] No

## 2023-03-08 NOTE — TELEPHONE ENCOUNTER
Can you send this to [facility]. [facility] is out. I have cancelled the prescription at [facility].

## 2023-04-06 ENCOUNTER — OFFICE VISIT (OUTPATIENT)
Dept: PAIN MEDICINE | Facility: CLINIC | Age: 73
End: 2023-04-06
Payer: COMMERCIAL

## 2023-04-06 VITALS
TEMPERATURE: 97.3 F | RESPIRATION RATE: 20 BRPM | DIASTOLIC BLOOD PRESSURE: 76 MMHG | OXYGEN SATURATION: 98 % | HEART RATE: 80 BPM | WEIGHT: 234 LBS | HEIGHT: 72 IN | BODY MASS INDEX: 31.69 KG/M2 | SYSTOLIC BLOOD PRESSURE: 131 MMHG

## 2023-04-06 DIAGNOSIS — M25.512 CHRONIC LEFT SHOULDER PAIN: Primary | ICD-10-CM

## 2023-04-06 DIAGNOSIS — G89.4 CHRONIC PAIN SYNDROME: ICD-10-CM

## 2023-04-06 DIAGNOSIS — G89.29 CHRONIC LEFT SHOULDER PAIN: Primary | ICD-10-CM

## 2023-04-06 DIAGNOSIS — Z79.899 HIGH RISK MEDICATION USE: ICD-10-CM

## 2023-04-06 PROCEDURE — 99213 OFFICE O/P EST LOW 20 MIN: CPT

## 2023-04-06 RX ORDER — HYDROCODONE BITARTRATE AND ACETAMINOPHEN 10; 325 MG/1; MG/1
1 TABLET ORAL EVERY 4 HOURS PRN
Qty: 180 TABLET | Refills: 0 | Status: SHIPPED | OUTPATIENT
Start: 2023-04-06

## 2023-04-06 NOTE — PROGRESS NOTES
"CHIEF COMPLAINT  Left shoulder pain     Subjective   Gallo Leary is a 73 y.o. male  who presents for follow-up.  He has a history of left shoulder pain. He reports that his pain has remained consistent since his last office visit.     Today pain is 6/10VAS in severity. Pain is located in his left shoulder. Describes this pain as nearly continuous ache. Pain is worsened by certain positions, weather changes, and overhead/heavy lifting. Pain improves with rest/reposition and medications. He has not tried heat or ice.     Continues with Hydrocodone 10mg 6/day. Denies any side effects from the regimen, including constipation and somnolence. The regimen helps decrease pain by 50%. He reports that pain medications helps \"keep pain at bay and allows me to work\". Notes improvement in activity and function with regimen. ADL's by self. Denies any bowel or bladder changes.     Shoulder Injury   The left shoulder is affected. The incident occurred more than 1 week ago. The injury mechanism was repetitive motion. The quality of the pain is described as aching (continuous). The pain does not radiate. The pain is at a severity of 4/10 (pain is unchanged since last office visit). The pain is moderate. Pertinent negatives include no chest pain or numbness. The symptoms are aggravated by movement and overhead lifting. He has tried rest, immobilization, heat and ice (Hydrocodone) for the symptoms. The treatment provided mild relief.      PEG Assessment   What number best describes your pain on average in the past week?5  What number best describes how, during the past week, pain has interfered with your enjoyment of life?0  What number best describes how, during the past week, pain has interfered with your general activity?  0    The following portions of the patient's history were reviewed and updated as appropriate: allergies, current medications, past family history, past medical history, past social history, past surgical " "history and problem list.    Review of Systems   Constitutional: Negative for activity change, fatigue and fever.   HENT: Negative for congestion.    Eyes: Negative for visual disturbance.   Respiratory: Negative for cough and chest tightness.    Cardiovascular: Negative for chest pain.   Gastrointestinal: Negative for abdominal pain, constipation and diarrhea.   Genitourinary: Negative for difficulty urinating and dysuria.   Neurological: Negative for dizziness, weakness, light-headedness, numbness and headaches.   Psychiatric/Behavioral: Negative for agitation, sleep disturbance and suicidal ideas. The patient is not nervous/anxious.      I have reviewed and confirmed the accuracy of the ROS as documented by the MA/LPN/RN ADIS Rodriguez    Vitals:    04/06/23 1537   BP: 131/76   BP Location: Left arm   Patient Position: Sitting   Cuff Size: Large Adult   Pulse: 80   Resp: 20   Temp: 97.3 °F (36.3 °C)   TempSrc: Temporal   SpO2: 98%   Weight: 106 kg (234 lb)   Height: 182.9 cm (72.01\")   PainSc:   4     Objective   Physical Exam  Constitutional:       Appearance: Normal appearance.   HENT:      Head: Normocephalic.   Cardiovascular:      Rate and Rhythm: Normal rate and regular rhythm.   Pulmonary:      Effort: Pulmonary effort is normal.      Breath sounds: Normal breath sounds.   Musculoskeletal:      Left shoulder: Tenderness and crepitus present. Decreased range of motion.      Cervical back: Normal range of motion.   Skin:     General: Skin is warm and dry.      Capillary Refill: Capillary refill takes less than 2 seconds.   Neurological:      General: No focal deficit present.      Mental Status: He is alert and oriented to person, place, and time.   Psychiatric:         Mood and Affect: Mood normal.         Behavior: Behavior normal.         Thought Content: Thought content normal.         Cognition and Memory: Cognition normal.       Assessment & Plan   Diagnoses and all orders for this visit:    1. " Chronic left shoulder pain (Primary)  -     HYDROcodone-acetaminophen (NORCO)  MG per tablet; Take 1 tablet by mouth Every 4 (Four) Hours As Needed for Severe Pain. 30 day supply  Dispense: 180 tablet; Refill: 0    2. Chronic pain syndrome    3. High risk medication use    --- The urine drug screen confirmation from 3/6/23 has been reviewed and the result is appropriate based on patient history and ZION report  --- The patient signed an updated copy of the controlled substance agreement on 9/8/22  --- Refill Hydrocodone. DNF 4/7/23. Patient appears stable with current regimen. No adverse effects. Regarding continuation of opioids, there is no evidence of aberrant behavior or any red flags.  The patient continues with appropriate response to opioid therapy. ADL's remain intact by self.   --- Follow-up 1 month     ZION REPORT  As part of the patient's treatment plan, I am prescribing controlled substances. The patient has been made aware of appropriate use of such medications, including potential risk of somnolence, limited ability to drive and/or work safely, and the potential for dependence or overdose. It has also been made clear that these medications are for use by this patient only, without concomitant use of alcohol or other substances unless prescribed.     Patient has completed prescribing agreement detailing terms of continued prescribing of controlled substances, including monitoring ZION reports, urine drug screening, and pill counts if necessary. The patient is aware that inappropriate use will results in cessation of prescribing such medications.    As the clinician, I personally reviewed the ZION from 4/6/23 while the patient was in the office today.    History and physical exam exhibit continued safe and appropriate use of controlled substances.    Dictated utilizing Dragon dictation.

## 2023-05-08 ENCOUNTER — OFFICE VISIT (OUTPATIENT)
Dept: PAIN MEDICINE | Facility: CLINIC | Age: 73
End: 2023-05-08
Payer: COMMERCIAL

## 2023-05-08 VITALS
OXYGEN SATURATION: 95 % | WEIGHT: 232.6 LBS | DIASTOLIC BLOOD PRESSURE: 65 MMHG | TEMPERATURE: 98.7 F | HEIGHT: 72 IN | SYSTOLIC BLOOD PRESSURE: 134 MMHG | BODY MASS INDEX: 31.51 KG/M2 | HEART RATE: 80 BPM

## 2023-05-08 DIAGNOSIS — M25.512 CHRONIC LEFT SHOULDER PAIN: Primary | ICD-10-CM

## 2023-05-08 DIAGNOSIS — G89.4 CHRONIC PAIN SYNDROME: ICD-10-CM

## 2023-05-08 DIAGNOSIS — Z79.899 HIGH RISK MEDICATION USE: ICD-10-CM

## 2023-05-08 DIAGNOSIS — G89.29 CHRONIC LEFT SHOULDER PAIN: Primary | ICD-10-CM

## 2023-05-08 PROCEDURE — 99213 OFFICE O/P EST LOW 20 MIN: CPT

## 2023-05-08 RX ORDER — HYDROCODONE BITARTRATE AND ACETAMINOPHEN 10; 325 MG/1; MG/1
1 TABLET ORAL EVERY 4 HOURS PRN
Qty: 180 TABLET | Refills: 0 | Status: SHIPPED | OUTPATIENT
Start: 2023-05-08

## 2023-05-08 RX ORDER — LORATADINE 10 MG/1
10 CAPSULE, LIQUID FILLED ORAL AS NEEDED
COMMUNITY

## 2023-05-08 NOTE — PROGRESS NOTES
"CHIEF COMPLAINT  Left shoulder pain    Subjective   Gallo Leary is a 73 y.o. male  who presents for follow-up.  He has a history of left shoulder pain. He reports that his pain has remained consistent since his last office visit. He reports that his severity in pain varies based on his activity level.     Today pain is 6/10VAS in severity. Pain is located in his left shoulder. Describes this pain as nearly continuous ache. Pain is worsened by certain positions, weather changes, and overhead/heavy lifting. Pain improves with rest/reposition and medications. He has not tried heat or ice.     Continues with Hydrocodone 10mg 6/day. Denies any side effects from the regimen, including constipation and somnolence. The regimen helps decrease pain by 50%. He reports that pain medications helps \"keep pain at bay and allows me to work\". Notes improvement in activity and function with regimen. ADL's by self. Denies any bowel or bladder changes.     Shoulder Injury   The left shoulder is affected. The incident occurred more than 1 week ago. The injury mechanism was repetitive motion. The quality of the pain is described as aching (continuous). The pain does not radiate. The pain is at a severity of 4/10 (Pain has remain consistent since last office visit). The pain is moderate. Pertinent negatives include no chest pain or numbness. The symptoms are aggravated by movement and overhead lifting. He has tried rest, immobilization, heat and ice (Hydrocodone) for the symptoms. The treatment provided mild relief.      PEG Assessment   What number best describes your pain on average in the past week?4  What number best describes how, during the past week, pain has interfered with your enjoyment of life?0  What number best describes how, during the past week, pain has interfered with your general activity?  0    The following portions of the patient's history were reviewed and updated as appropriate: allergies, current " "medications, past family history, past medical history, past social history, past surgical history and problem list.    Review of Systems   Constitutional: Negative for chills, fatigue and fever.   Respiratory: Positive for cough (related to allergies). Negative for shortness of breath.    Cardiovascular: Negative for chest pain.   Gastrointestinal: Negative for abdominal pain, constipation and diarrhea.   Genitourinary: Negative for difficulty urinating and dysuria.   Musculoskeletal: Positive for back pain.   Allergic/Immunologic: Positive for environmental allergies.   Neurological: Negative for dizziness, weakness, light-headedness and numbness.   Psychiatric/Behavioral: Negative for sleep disturbance.     I have reviewed and confirmed the accuracy of the ROS as documented by the MA/LPN/RN ADIS Rodriguez    Vitals:    05/08/23 1536   BP: 134/65   BP Location: Left arm   Patient Position: Sitting   Pulse: 80   Temp: 98.7 °F (37.1 °C)   TempSrc: Temporal   SpO2: 95%   Weight: 106 kg (232 lb 9.6 oz)   Height: 182.9 cm (72.01\")   PainSc:   4   PainLoc: Shoulder     Objective   Physical Exam  Constitutional:       Appearance: Normal appearance.   HENT:      Head: Normocephalic.   Cardiovascular:      Rate and Rhythm: Normal rate and regular rhythm.   Pulmonary:      Effort: Pulmonary effort is normal.      Breath sounds: Normal breath sounds.   Musculoskeletal:      Left shoulder: Tenderness and crepitus present. Decreased range of motion.      Cervical back: Normal range of motion.   Skin:     General: Skin is warm and dry.      Capillary Refill: Capillary refill takes less than 2 seconds.   Neurological:      General: No focal deficit present.      Mental Status: He is alert and oriented to person, place, and time.   Psychiatric:         Mood and Affect: Mood normal.         Behavior: Behavior normal.         Thought Content: Thought content normal.         Cognition and Memory: Cognition normal. "       Assessment & Plan   Diagnoses and all orders for this visit:    1. Chronic left shoulder pain (Primary)  -     HYDROcodone-acetaminophen (NORCO)  MG per tablet; Take 1 tablet by mouth Every 4 (Four) Hours As Needed for Severe Pain. 30 day supply  Dispense: 180 tablet; Refill: 0    2. Chronic pain syndrome    3. High risk medication use    --- The urine drug screen confirmation from 3/6/23 has been reviewed and the result is appropriate based on patient history and ZION report  --- The patient signed an updated copy of the controlled substance agreement on 9/8/22.  --- Refill Hydrocodone. Patient appears stable with current regimen. No adverse effects. Regarding continuation of opioids, there is no evidence of aberrant behavior or any red flags.  The patient continues with appropriate response to opioid therapy. ADL's remain intact by self.   --- Follow-up 1 month     ZION REPORT  As part of the patient's treatment plan, I am prescribing controlled substances. The patient has been made aware of appropriate use of such medications, including potential risk of somnolence, limited ability to drive and/or work safely, and the potential for dependence or overdose. It has also been made clear that these medications are for use by this patient only, without concomitant use of alcohol or other substances unless prescribed.     Patient has completed prescribing agreement detailing terms of continued prescribing of controlled substances, including monitoring ZION reports, urine drug screening, and pill counts if necessary. The patient is aware that inappropriate use will results in cessation of prescribing such medications.    As the clinician, I personally reviewed the ZION from 5/8/23 while the patient was in the office today.    History and physical exam exhibit continued safe and appropriate use of controlled substances.    Dictated utilizing Dragon dictation.

## 2023-06-08 ENCOUNTER — OFFICE VISIT (OUTPATIENT)
Dept: PAIN MEDICINE | Facility: CLINIC | Age: 73
End: 2023-06-08
Payer: COMMERCIAL

## 2023-06-08 VITALS
OXYGEN SATURATION: 95 % | BODY MASS INDEX: 31.29 KG/M2 | HEART RATE: 88 BPM | WEIGHT: 231 LBS | SYSTOLIC BLOOD PRESSURE: 147 MMHG | TEMPERATURE: 98.2 F | HEIGHT: 72 IN | DIASTOLIC BLOOD PRESSURE: 72 MMHG

## 2023-06-08 DIAGNOSIS — M25.512 CHRONIC LEFT SHOULDER PAIN: Primary | ICD-10-CM

## 2023-06-08 DIAGNOSIS — Z79.899 HIGH RISK MEDICATION USE: ICD-10-CM

## 2023-06-08 DIAGNOSIS — G89.29 CHRONIC LEFT SHOULDER PAIN: Primary | ICD-10-CM

## 2023-06-08 RX ORDER — HYDROCODONE BITARTRATE AND ACETAMINOPHEN 10; 325 MG/1; MG/1
1 TABLET ORAL EVERY 4 HOURS PRN
Qty: 180 TABLET | Refills: 0 | Status: SHIPPED | OUTPATIENT
Start: 2023-06-08

## 2023-06-08 NOTE — PROGRESS NOTES
"CHIEF COMPLAINT    Follow up shoulder pain. The pain is unchanged from last visit.    Subjective   Gallo Leary is a 73 y.o. male  who presents for follow-up.  He has a history of shoulder pain.  She continues to have pain localized in the left shoulder which is described as a nearly continuous aching sensation.  He does find that his pain is increased with any type of increase of his physical activity and also aggravated by certain positions, overhead heavy lifting and weather changes.  Is that his pain is improved with resting and repositioning and use of his pain medications.  Overall patient reports essentially stable and unchanged pain presentation.    Medication management consist of hydrocodone 10 mg 6/day which she tolerates without adverse effect such as somnolence or constipation.  He finds that this regimen reduces his pain by over 50% allowing him to continue working and keep \"his pain at bay.\"        Shoulder Injury   The left shoulder is affected. The injury mechanism was repetitive motion. The quality of the pain is described as aching. The pain does not radiate. The pain is at a severity of 5/10. The pain is moderate. Pertinent negatives include no numbness. The symptoms are aggravated by movement and overhead lifting. The treatment provided moderate (with medication) relief.      PEG Assessment   What number best describes your pain on average in the past week?6  What number best describes how, during the past week, pain has interfered with your enjoyment of life?0  What number best describes how, during the past week, pain has interfered with your general activity?  0    Review of Pertinent Medical Data ---                The following portions of the patient's history were reviewed and updated as appropriate: allergies, current medications, past family history, past medical history, past social history, past surgical history, and problem list.    Review of Systems   Constitutional:  " "Positive for fatigue. Negative for chills and fever.   Respiratory:  Negative for cough and shortness of breath.    Gastrointestinal:  Negative for abdominal pain, constipation and diarrhea.   Genitourinary:  Negative for difficulty urinating and dysuria.   Musculoskeletal:  Positive for back pain.   Neurological:  Negative for dizziness, weakness, light-headedness and numbness.   Psychiatric/Behavioral:  Negative for sleep disturbance.    I have reviewed and confirmed the accuracy of the ROS as documented by the MA/LPN/RN MARILUZ Navarro  Vitals:    06/08/23 1522   BP: 147/72   BP Location: Left arm   Patient Position: Sitting   Pulse: 88   Temp: 98.2 °F (36.8 °C)   TempSrc: Temporal   SpO2: 95%   Weight: 105 kg (231 lb)   Height: 182.9 cm (72.01\")   PainSc:   5   PainLoc: Shoulder         Objective   Physical Exam  Vitals and nursing note reviewed.   Constitutional:       Appearance: Normal appearance. He is normal weight.   HENT:      Head: Normocephalic.   Neurological:      Mental Status: He is alert and oriented to person, place, and time.      Cranial Nerves: Cranial nerves 2-12 are intact.      Sensory: Sensation is intact.      Motor: Motor function is intact.      Gait: Gait is intact.   Psychiatric:         Mood and Affect: Mood normal.         Behavior: Behavior normal.         Thought Content: Thought content normal.         Judgment: Judgment normal.           Assessment & Plan   Diagnoses and all orders for this visit:    1. Chronic left shoulder pain (Primary)    2. High risk medication use        --- Follow-up in 4 weeks or sooner as needed  --- Refill hydrocodone. Patient appears stable with current regimen. No adverse effects. Regarding continuation of opioids, there is no evidence of aberrant behavior or any red flags.  The patient continues with appropriate response to opioid therapy. ADL's remain intact by self.     The urine drug screen confirmation from 3/6/2023 has been reviewed and the " result is appropriate based on patient history and ZION report    The patient signed an updated copy of the controlled substance agreement on 9/8/2022.         ZION REPORT  As part of the patient's treatment plan, I am prescribing controlled substances. The patient has been made aware of appropriate use of such medications, including potential risk of somnolence, limited ability to drive and/or work safely, and the potential for dependence or overdose. It has also been made clear that these medications are for use by this patient only, without concomitant use of alcohol or other substances unless prescribed.     Patient has completed prescribing agreement detailing terms of continued prescribing of controlled substances, including monitoring ZION reports, urine drug screening, and pill counts if necessary. The patient is aware that inappropriate use will results in cessation of prescribing such medications.    As the clinician, I personally reviewed the ZION from 6/8/2023 while the patient was in the office today.    History and physical exam exhibit continued safe and appropriate use of controlled substances.     Dictated utilizing Dragon dictation.

## 2023-06-20 NOTE — TELEPHONE ENCOUNTER
Called [facility] and them the ok to fill early per Dr. Dominguez and notified patient. Hub please inform patient   Topical Sulfur Applications Pregnancy And Lactation Text: This medication is Pregnancy Category C and has an unknown safety profile during pregnancy. It is unknown if this topical medication is excreted in breast milk.

## 2023-08-07 ENCOUNTER — OFFICE VISIT (OUTPATIENT)
Dept: PAIN MEDICINE | Facility: CLINIC | Age: 73
End: 2023-08-07
Payer: COMMERCIAL

## 2023-08-07 VITALS
DIASTOLIC BLOOD PRESSURE: 74 MMHG | TEMPERATURE: 97 F | HEART RATE: 75 BPM | BODY MASS INDEX: 30.91 KG/M2 | RESPIRATION RATE: 20 BRPM | HEIGHT: 72 IN | OXYGEN SATURATION: 93 % | SYSTOLIC BLOOD PRESSURE: 138 MMHG | WEIGHT: 228.2 LBS

## 2023-08-07 DIAGNOSIS — G89.29 CHRONIC LEFT SHOULDER PAIN: Primary | ICD-10-CM

## 2023-08-07 DIAGNOSIS — Z79.899 HIGH RISK MEDICATION USE: ICD-10-CM

## 2023-08-07 DIAGNOSIS — M25.512 CHRONIC LEFT SHOULDER PAIN: Primary | ICD-10-CM

## 2023-08-07 DIAGNOSIS — G89.4 CHRONIC PAIN SYNDROME: ICD-10-CM

## 2023-08-07 PROCEDURE — 99213 OFFICE O/P EST LOW 20 MIN: CPT

## 2023-08-07 RX ORDER — HYDROCODONE BITARTRATE AND ACETAMINOPHEN 10; 325 MG/1; MG/1
1 TABLET ORAL EVERY 4 HOURS PRN
Qty: 180 TABLET | Refills: 0 | Status: SHIPPED | OUTPATIENT
Start: 2023-08-07

## 2023-08-07 NOTE — PROGRESS NOTES
"CHIEF COMPLAINT  Right shoulder pain    Subjective   Gallo Leary is a 73 y.o. male  who presents for follow-up.  He has a history of shoulder pain. He reports that his pain has slightly worsened since his last office visit due to physical demands of his job.     Today pain is 6/10VAS in severity. Pain is located in his left shoulder. Describes this pain as nearly continuous ache. Pain is worsened by certain positions, weather changes, and overhead/heavy lifting. Pain improves with rest/reposition and medications. He has not tried heat or ice.     Continues with Hydrocodone 10mg 6/day. Denies any side effects from the regimen, including constipation and somnolence. The regimen helps decrease pain by 50%. He reports that pain medications helps \"keep pain at bay and allows me to work\". Notes improvement in activity and function with regimen. ADL's by self. Denies any bowel or bladder changes.     Shoulder Injury   The left shoulder is affected. The incident occurred more than 1 week ago. The injury mechanism was repetitive motion. The quality of the pain is described as aching (continuous). The pain does not radiate. The pain is at a severity of 6/10 (Pain remains unchanged since last office visit). The pain is moderate. Pertinent negatives include no chest pain or numbness. The symptoms are aggravated by movement and overhead lifting. He has tried rest, immobilization, heat and ice (Hydrocodone) for the symptoms. The treatment provided mild relief.      PEG Assessment   What number best describes your pain on average in the past week?5  What number best describes how, during the past week, pain has interfered with your enjoyment of life?0  What number best describes how, during the past week, pain has interfered with your general activity?  0    The following portions of the patient's history were reviewed and updated as appropriate: allergies, current medications, past family history, past medical history, " "past social history, past surgical history, and problem list.    Review of Systems   Constitutional:  Negative for activity change and fatigue.   Cardiovascular:  Negative for chest pain.   Gastrointestinal:  Negative for abdominal pain, constipation and diarrhea.   Genitourinary:  Negative for difficulty urinating and dysuria.   Neurological:  Negative for dizziness, weakness, light-headedness, numbness and headaches.   Psychiatric/Behavioral:  Negative for agitation, sleep disturbance and suicidal ideas. The patient is not nervous/anxious.    I have reviewed and confirmed the accuracy of the ROS as documented by the MA/LPN/RN ADIS Rodriguez    Vitals:    08/07/23 1513   BP: 138/74   BP Location: Right arm   Patient Position: Sitting   Cuff Size: Adult   Pulse: 75   Resp: 20   Temp: 97 øF (36.1 øC)   SpO2: 93%   Weight: 104 kg (228 lb 3.2 oz)   Height: 182.9 cm (72.01\")   PainSc:   6   PainLoc: Shoulder     Objective   Physical Exam  Constitutional:       Appearance: Normal appearance.   HENT:      Head: Normocephalic.   Cardiovascular:      Rate and Rhythm: Normal rate and regular rhythm.   Pulmonary:      Effort: Pulmonary effort is normal.      Breath sounds: Normal breath sounds.   Musculoskeletal:      Left shoulder: Tenderness and crepitus present. Decreased range of motion.      Cervical back: Normal range of motion.   Skin:     General: Skin is warm and dry.      Capillary Refill: Capillary refill takes less than 2 seconds.   Neurological:      General: No focal deficit present.      Mental Status: He is alert and oriented to person, place, and time.   Psychiatric:         Mood and Affect: Mood normal.         Behavior: Behavior normal.         Thought Content: Thought content normal.         Cognition and Memory: Cognition normal.     Assessment & Plan   Diagnoses and all orders for this visit:    1. Chronic left shoulder pain (Primary)  -     HYDROcodone-acetaminophen (NORCO)  MG per tablet; " Take 1 tablet by mouth Every 4 (Four) Hours As Needed for Severe Pain. 30 day supply  Dispense: 180 tablet; Refill: 0    2. Chronic pain syndrome    3. High risk medication use    --- The urine drug screen confirmation from 3/6/23 has been reviewed and the result is appropriate based on patient history and ZION report  --- The patient signed an updated copy of the controlled substance agreement on 9/8/22  --- Continue Hydrocodone. Patient appears stable with current regimen. No adverse effects. Regarding continuation of opioids, there is no evidence of aberrant behavior or any red flags.  The patient continues with appropriate response to opioid therapy. ADL's remain intact by self.   --- Follow-up 1 month    Gallo Leary reports a pain score of 6.  Given his pain assessment as noted, treatment options were discussed and the following options were decided upon as a follow-up plan to address the patient's pain: continuation of current treatment plan for pain and prescription for opiod analgesics.       ZION REPORT  As part of the patient's treatment plan, I am prescribing controlled substances. The patient has been made aware of appropriate use of such medications, including potential risk of somnolence, limited ability to drive and/or work safely, and the potential for dependence or overdose. It has also been made clear that these medications are for use by this patient only, without concomitant use of alcohol or other substances unless prescribed.     Patient has completed prescribing agreement detailing terms of continued prescribing of controlled substances, including monitoring ZION reports, urine drug screening, and pill counts if necessary. The patient is aware that inappropriate use will results in cessation of prescribing such medications.    As the clinician, I personally reviewed the ZION from 8/7/23 while the patient was in the office today.    History and physical exam exhibit continued  safe and appropriate use of controlled substances.    Dictated utilizing Dragon dictation.

## 2023-09-05 ENCOUNTER — OFFICE VISIT (OUTPATIENT)
Dept: PAIN MEDICINE | Facility: CLINIC | Age: 73
End: 2023-09-05
Payer: COMMERCIAL

## 2023-09-05 VITALS
TEMPERATURE: 96.6 F | HEIGHT: 72 IN | SYSTOLIC BLOOD PRESSURE: 120 MMHG | HEART RATE: 78 BPM | DIASTOLIC BLOOD PRESSURE: 80 MMHG | WEIGHT: 230.8 LBS | OXYGEN SATURATION: 93 % | BODY MASS INDEX: 31.26 KG/M2 | RESPIRATION RATE: 18 BRPM

## 2023-09-05 DIAGNOSIS — Z79.899 HIGH RISK MEDICATION USE: ICD-10-CM

## 2023-09-05 DIAGNOSIS — G89.29 CHRONIC LEFT SHOULDER PAIN: Primary | ICD-10-CM

## 2023-09-05 DIAGNOSIS — G89.4 CHRONIC PAIN SYNDROME: ICD-10-CM

## 2023-09-05 DIAGNOSIS — M25.512 CHRONIC LEFT SHOULDER PAIN: Primary | ICD-10-CM

## 2023-09-05 PROCEDURE — 99213 OFFICE O/P EST LOW 20 MIN: CPT

## 2023-09-05 PROCEDURE — 80305 DRUG TEST PRSMV DIR OPT OBS: CPT

## 2023-09-05 RX ORDER — HYDROCODONE BITARTRATE AND ACETAMINOPHEN 10; 325 MG/1; MG/1
1 TABLET ORAL EVERY 4 HOURS PRN
Qty: 180 TABLET | Refills: 0 | Status: SHIPPED | OUTPATIENT
Start: 2023-09-05

## 2023-09-05 RX ORDER — LATANOPROST 50 UG/ML
SOLUTION/ DROPS OPHTHALMIC
COMMUNITY
Start: 2023-08-09

## 2023-09-05 NOTE — PROGRESS NOTES
"CHIEF COMPLAINT  Left shoulder pain    Subjective   Gallo Leary is a 73 y.o. male  who presents for follow-up.  He has a history of chronic right left shoulder pain. He reports that his pain has fluctuated since his last office visit and varies based on activity level.      Today pain is 5/10VAS in severity. Pain is located in his left shoulder. Describes this pain as nearly continuous ache. Pain is worsened by certain positions, weather changes, and overhead/heavy lifting. Pain improves with rest/reposition and medications. He has not tried heat or ice.     Continues with Hydrocodone 10mg 6/day. Denies any side effects from the regimen, including constipation and somnolence. The regimen helps decrease pain by 50%. He reports that pain medications helps \"keep pain at bay and allows me to work\". Notes improvement in activity and function with regimen. ADL's by self. Denies any bowel or bladder changes.     Shoulder Injury   The left shoulder is affected. The incident occurred more than 1 week ago. The injury mechanism was repetitive motion. The quality of the pain is described as aching (continuous). The pain does not radiate. The pain is at a severity of 5/10 (Pain has waxed and waned since last office visit). The pain is moderate. Pertinent negatives include no chest pain or numbness. The symptoms are aggravated by movement and overhead lifting. He has tried rest, immobilization, heat and ice (Hydrocodone) for the symptoms. The treatment provided mild relief.      PEG Assessment   What number best describes your pain on average in the past week?4  What number best describes how, during the past week, pain has interfered with your enjoyment of life?3  What number best describes how, during the past week, pain has interfered with your general activity?  3    The following portions of the patient's history were reviewed and updated as appropriate: allergies, current medications, past family history, past " "medical history, past social history, past surgical history, and problem list.    Review of Systems   Constitutional:  Negative for activity change and fatigue.   Cardiovascular:  Negative for chest pain.   Gastrointestinal:  Negative for abdominal pain, constipation and diarrhea.   Genitourinary:  Negative for difficulty urinating and dysuria.   Neurological:  Negative for dizziness, weakness, light-headedness, numbness and headaches.   Psychiatric/Behavioral:  Negative for agitation, sleep disturbance and suicidal ideas. The patient is not nervous/anxious.      I have reviewed and confirmed the accuracy of the ROS as documented by the MA/LPN/RN ADIS Rodriguez    Vitals:    09/05/23 1601   BP: 120/80   BP Location: Left arm   Patient Position: Sitting   Cuff Size: Adult   Pulse: 78   Resp: 18   Temp: 96.6 °F (35.9 °C)   SpO2: 93%   Weight: 105 kg (230 lb 12.8 oz)   Height: 182.9 cm (72.01\")   PainSc:   5   PainLoc: Shoulder     Objective   Physical Exam  Constitutional:       Appearance: Normal appearance.   HENT:      Head: Normocephalic.   Cardiovascular:      Rate and Rhythm: Normal rate and regular rhythm.   Pulmonary:      Effort: Pulmonary effort is normal.      Breath sounds: Normal breath sounds.   Musculoskeletal:      Left shoulder: Tenderness and crepitus present. Decreased range of motion.      Cervical back: Normal range of motion.   Skin:     General: Skin is warm and dry.      Capillary Refill: Capillary refill takes less than 2 seconds.   Neurological:      General: No focal deficit present.      Mental Status: He is alert and oriented to person, place, and time.   Psychiatric:         Mood and Affect: Mood normal.         Behavior: Behavior normal.         Thought Content: Thought content normal.         Cognition and Memory: Cognition normal.     Assessment & Plan   Diagnoses and all orders for this visit:    1. Chronic left shoulder pain (Primary)  -     HYDROcodone-acetaminophen (NORCO) "  MG per tablet; Take 1 tablet by mouth Every 4 (Four) Hours As Needed for Severe Pain. 30 day supply  Dispense: 180 tablet; Refill: 0  -     Urine Drug Screen Confirmation - Urine, Clean Catch; Future  -     POC Urine Drug Screen, Triage    2. Chronic pain syndrome  -     Urine Drug Screen Confirmation - Urine, Clean Catch; Future  -     POC Urine Drug Screen, Triage    3. High risk medication use  -     Urine Drug Screen Confirmation - Urine, Clean Catch; Future  -     POC Urine Drug Screen, Triage    --- Routine UDS in office today as part of monitoring requirements for controlled substances.  The specimen was viewed and the immunoassay result reviewed and is +OPI.  This specimen will be sent to [facility] for confirmation.     --- The patient signed an updated copy of the controlled substance agreement on 9/5/23  --- Continue Hydrocodone. DNF 9/6/23. Patient appears stable with current regimen. No adverse effects. Regarding continuation of opioids, there is no evidence of aberrant behavior or any red flags.  The patient continues with appropriate response to opioid therapy. ADL's remain intact by self.   --- Follow-up 1 month    Gallo Leary reports a pain score of 5. Given his pain assessment as noted, treatment options were discussed and the following options were decided upon as a follow-up plan to address the patient's pain: continuation of current treatment plan for pain and prescription for opiod analgesics.       ZION VILLALOBOS  As part of the patient's treatment plan, I am prescribing controlled substances. The patient has been made aware of appropriate use of such medications, including potential risk of somnolence, limited ability to drive and/or work safely, and the potential for dependence or overdose. It has also been made clear that these medications are for use by this patient only, without concomitant use of alcohol or other substances unless prescribed.     Patient has  completed prescribing agreement detailing terms of continued prescribing of controlled substances, including monitoring ZION reports, urine drug screening, and pill counts if necessary. The patient is aware that inappropriate use will results in cessation of prescribing such medications.    As the clinician, I personally reviewed the ZION from 9/5/23 while the patient was in the office today.    History and physical exam exhibit continued safe and appropriate use of controlled substances.    Dictated utilizing Dragon dictation.

## 2023-10-06 ENCOUNTER — OFFICE VISIT (OUTPATIENT)
Dept: PAIN MEDICINE | Facility: CLINIC | Age: 73
End: 2023-10-06
Payer: COMMERCIAL

## 2023-10-06 VITALS
HEART RATE: 85 BPM | OXYGEN SATURATION: 95 % | TEMPERATURE: 97.8 F | WEIGHT: 234 LBS | SYSTOLIC BLOOD PRESSURE: 130 MMHG | DIASTOLIC BLOOD PRESSURE: 98 MMHG | BODY MASS INDEX: 31.69 KG/M2 | HEIGHT: 72 IN

## 2023-10-06 DIAGNOSIS — G89.29 CHRONIC LEFT SHOULDER PAIN: Primary | ICD-10-CM

## 2023-10-06 DIAGNOSIS — G89.4 CHRONIC PAIN SYNDROME: ICD-10-CM

## 2023-10-06 DIAGNOSIS — Z79.899 HIGH RISK MEDICATION USE: ICD-10-CM

## 2023-10-06 DIAGNOSIS — M25.512 CHRONIC LEFT SHOULDER PAIN: Primary | ICD-10-CM

## 2023-10-06 RX ORDER — HYDROCODONE BITARTRATE AND ACETAMINOPHEN 10; 325 MG/1; MG/1
1 TABLET ORAL EVERY 4 HOURS PRN
Qty: 180 TABLET | Refills: 0 | Status: SHIPPED | OUTPATIENT
Start: 2023-10-06

## 2023-10-06 NOTE — PROGRESS NOTES
"CHIEF COMPLAINT  Left shoulder pain    Subjective   Gallo Leary is a 73 y.o. male  who presents for follow-up.  He has a history of chronic left shoulder pain. He reports that his pain has slightly worsened since his last office visit due a fall a few days ago. He reports that he fell going up the stairs after stepping wrong.     Today pain is 5/10VAS in severity. Pain is located in his left shoulder. Describes this pain as nearly continuous ache. Pain is worsened by certain positions, weather changes, and overhead/heavy lifting. Pain improves with rest/reposition and medications. He has not tried heat or ice.     Continues with Hydrocodone 10mg 6/day. Denies any side effects from the regimen, including constipation and somnolence. The regimen helps decrease pain by 50%. He reports that pain medications helps \"keep pain at bay and allows me to work\". Notes improvement in activity and function with regimen. ADL's by self. Denies any bowel or bladder changes.     Shoulder Injury   The left shoulder is affected. The incident occurred more than 1 week ago. The injury mechanism was repetitive motion. The quality of the pain is described as aching (continuous). The pain does not radiate. The pain is at a severity of 5/10 (slighty worsened since last office visit). The pain is moderate. Pertinent negatives include no chest pain or numbness. The symptoms are aggravated by movement and overhead lifting. He has tried rest, immobilization, heat and ice (Hydrocodone) for the symptoms. The treatment provided mild relief.      PEG Assessment   What number best describes your pain on average in the past week?4  What number best describes how, during the past week, pain has interfered with your enjoyment of life?0  What number best describes how, during the past week, pain has interfered with your general activity?  0    The following portions of the patient's history were reviewed and updated as appropriate: allergies, " "current medications, past family history, past medical history, past social history, past surgical history, and problem list.    Review of Systems   Constitutional:  Negative for chills, fatigue and fever.   Respiratory:  Negative for cough and shortness of breath.    Cardiovascular:  Negative for chest pain.   Gastrointestinal:  Negative for abdominal pain, constipation and diarrhea.   Genitourinary:  Negative for difficulty urinating and dysuria.   Neurological:  Negative for dizziness, weakness, light-headedness, numbness and headaches.   Psychiatric/Behavioral:  Negative for sleep disturbance.      I have reviewed and confirmed the accuracy of the ROS as documented by the MA/LPN/RN ADIS Rodriguez    Vitals:    10/06/23 1446   BP: 130/98   BP Location: Left arm   Patient Position: Sitting   Pulse: 85   Temp: 97.8 °F (36.6 °C)   TempSrc: Temporal   SpO2: 95%   Weight: 106 kg (234 lb)   Height: 182.9 cm (72.01\")   PainSc:   5   PainLoc: Shoulder     Objective   Physical Exam  Constitutional:       Appearance: Normal appearance.   HENT:      Head: Normocephalic.   Cardiovascular:      Rate and Rhythm: Normal rate and regular rhythm.   Pulmonary:      Effort: Pulmonary effort is normal.      Breath sounds: Normal breath sounds.   Musculoskeletal:      Left shoulder: Tenderness and crepitus present. Decreased range of motion.      Cervical back: Normal range of motion.   Skin:     General: Skin is warm and dry.      Capillary Refill: Capillary refill takes less than 2 seconds.   Neurological:      General: No focal deficit present.      Mental Status: He is alert and oriented to person, place, and time.   Psychiatric:         Mood and Affect: Mood normal.         Behavior: Behavior normal.         Thought Content: Thought content normal.         Cognition and Memory: Cognition normal.     Assessment & Plan   Diagnoses and all orders for this visit:    1. Chronic left shoulder pain (Primary)  -     " HYDROcodone-acetaminophen (NORCO)  MG per tablet; Take 1 tablet by mouth Every 4 (Four) Hours As Needed for Severe Pain. 30 day supply  Dispense: 180 tablet; Refill: 0    2. Chronic pain syndrome    3. High risk medication use    --- The urine drug screen confirmation from 9/5/23 has been reviewed and the result is appropriate based on patient history and ZION report  --- The patient signed an updated copy of the controlled substance agreement on 9/5/23  --- Continue Hydrocodone. Patient appears stable with current regimen. No adverse effects. Regarding continuation of opioids, there is no evidence of aberrant behavior or any red flags.  The patient continues with appropriate response to opioid therapy. ADL's remain intact by self.   --- Follow-up 1 month    Gallo Leary reports a pain score of 5.  Given his pain assessment as noted, treatment options were discussed and the following options were decided upon as a follow-up plan to address the patient's pain: continuation of current treatment plan for pain and prescription for opiod analgesics.     ZION REPORT  As part of the patient's treatment plan, I am prescribing controlled substances. The patient has been made aware of appropriate use of such medications, including potential risk of somnolence, limited ability to drive and/or work safely, and the potential for dependence or overdose. It has also been made clear that these medications are for use by this patient only, without concomitant use of alcohol or other substances unless prescribed.     Patient has completed prescribing agreement detailing terms of continued prescribing of controlled substances, including monitoring ZION reports, urine drug screening, and pill counts if necessary. The patient is aware that inappropriate use will results in cessation of prescribing such medications.    As the clinician, I personally reviewed the ZION from 10/6/23 while the patient was in the office  today.    History and physical exam exhibit continued safe and appropriate use of controlled substances.    Dictated utilizing Dragon dictation.

## 2023-11-06 ENCOUNTER — OFFICE VISIT (OUTPATIENT)
Dept: PAIN MEDICINE | Facility: CLINIC | Age: 73
End: 2023-11-06
Payer: COMMERCIAL

## 2023-11-06 VITALS
HEIGHT: 72 IN | HEART RATE: 84 BPM | OXYGEN SATURATION: 93 % | TEMPERATURE: 96.4 F | BODY MASS INDEX: 31.99 KG/M2 | DIASTOLIC BLOOD PRESSURE: 88 MMHG | WEIGHT: 236.2 LBS | RESPIRATION RATE: 18 BRPM | SYSTOLIC BLOOD PRESSURE: 130 MMHG

## 2023-11-06 DIAGNOSIS — M25.512 CHRONIC LEFT SHOULDER PAIN: Primary | ICD-10-CM

## 2023-11-06 DIAGNOSIS — Z79.899 HIGH RISK MEDICATION USE: ICD-10-CM

## 2023-11-06 DIAGNOSIS — G89.29 CHRONIC LEFT SHOULDER PAIN: Primary | ICD-10-CM

## 2023-11-06 DIAGNOSIS — G89.4 CHRONIC PAIN SYNDROME: ICD-10-CM

## 2023-11-06 PROCEDURE — 99213 OFFICE O/P EST LOW 20 MIN: CPT

## 2023-11-06 RX ORDER — HYDROCODONE BITARTRATE AND ACETAMINOPHEN 10; 325 MG/1; MG/1
1 TABLET ORAL EVERY 4 HOURS PRN
Qty: 180 TABLET | Refills: 0 | Status: SHIPPED | OUTPATIENT
Start: 2023-11-06

## 2023-11-06 NOTE — PROGRESS NOTES
"CHIEF COMPLAINT  Left shoulder pain    Subjective   Gallo Leary is a 73 y.o. male  who presents for follow-up.  He has a history of chronic left shoulder pain. He reports that his pain has remained consistent since his last office visit and varies based on activity level.     Today pain is 5/10VAS in severity. Pain is located in his left shoulder. Describes this pain as nearly continuous ache. Pain is worsened by certain positions, weather changes, and overhead/heavy lifting. Pain improves with rest/reposition and medications. He has not tried heat or ice.     Continues with Hydrocodone 10mg 6/day. Denies any side effects from the regimen, including constipation and somnolence. The regimen helps decrease pain by 50%. He reports that pain medications helps \"keep pain at bay and allows me to work\". Notes improvement in activity and function with regimen. ADL's by self. Denies any bowel or bladder changes.     Shoulder Injury   The left shoulder is affected. The incident occurred more than 1 week ago. The injury mechanism was repetitive motion. The quality of the pain is described as aching (continuous). The pain does not radiate. The pain is at a severity of 5/10 (slighty worsened since last office visit). The pain is moderate. Pertinent negatives include no chest pain or numbness. The symptoms are aggravated by movement and overhead lifting. He has tried rest, immobilization, heat and ice (Hydrocodone) for the symptoms. The treatment provided mild relief.      PEG Assessment   What number best describes your pain on average in the past week?5  What number best describes how, during the past week, pain has interfered with your enjoyment of life?3  What number best describes how, during the past week, pain has interfered with your general activity?  3    The following portions of the patient's history were reviewed and updated as appropriate: allergies, current medications, past family history, past medical " "history, past social history, past surgical history, and problem list.    Review of Systems   Constitutional:  Negative for activity change and fatigue.   Cardiovascular:  Negative for chest pain.   Gastrointestinal:  Negative for abdominal pain, constipation and diarrhea.   Genitourinary:  Negative for difficulty urinating and dysuria.   Neurological:  Negative for dizziness, weakness, light-headedness, numbness and headaches.   Psychiatric/Behavioral:  Negative for agitation, sleep disturbance and suicidal ideas. The patient is not nervous/anxious.      I have reviewed and confirmed the accuracy of the ROS as documented by the MA/LPN/RN ADIS Rodriguez    Vitals:    11/06/23 1538   BP: 130/88   BP Location: Left arm   Patient Position: Sitting   Cuff Size: Adult   Pulse: 84   Resp: 18   Temp: 96.4 °F (35.8 °C)   SpO2: 93%   Weight: 107 kg (236 lb 3.2 oz)   Height: 182.9 cm (72.01\")   PainSc:   5   PainLoc: Shoulder     Objective   Physical Exam  Constitutional:       Appearance: Normal appearance.   HENT:      Head: Normocephalic.   Cardiovascular:      Rate and Rhythm: Normal rate and regular rhythm.   Pulmonary:      Effort: Pulmonary effort is normal.      Breath sounds: Normal breath sounds.   Musculoskeletal:      Left shoulder: Tenderness and crepitus present. Decreased range of motion.      Cervical back: Normal range of motion.   Skin:     General: Skin is warm and dry.      Capillary Refill: Capillary refill takes less than 2 seconds.   Neurological:      General: No focal deficit present.      Mental Status: He is alert and oriented to person, place, and time.   Psychiatric:         Mood and Affect: Mood normal.         Behavior: Behavior normal.         Thought Content: Thought content normal.         Cognition and Memory: Cognition normal.       Assessment & Plan   Diagnoses and all orders for this visit:    1. Chronic left shoulder pain (Primary)  -     HYDROcodone-acetaminophen (NORCO)  " MG per tablet; Take 1 tablet by mouth Every 4 (Four) Hours As Needed for Severe Pain. 30 day supply  Dispense: 180 tablet; Refill: 0    2. Chronic pain syndrome    3. High risk medication use    --- The urine drug screen confirmation from 9/5/23 has been reviewed and the result is appropriate based on patient history and ZION report  --- The patient signed an updated copy of the controlled substance agreement on 9/5/23  --- Continue Hydrocodone. Patient appears stable with current regimen. No adverse effects. Regarding continuation of opioids, there is no evidence of aberrant behavior or any red flags.  The patient continues with appropriate response to opioid therapy. ADL's remain intact by self.   --- Follow-up 1 month    Gallo Leary reports a pain score of 5.  Given his pain assessment as noted, treatment options were discussed and the following options were decided upon as a follow-up plan to address the patient's pain: continuation of current treatment plan for pain and prescription for opiod analgesics.       ZION REPORT  As part of the patient's treatment plan, I am prescribing controlled substances. The patient has been made aware of appropriate use of such medications, including potential risk of somnolence, limited ability to drive and/or work safely, and the potential for dependence or overdose. It has also been made clear that these medications are for use by this patient only, without concomitant use of alcohol or other substances unless prescribed.     Patient has completed prescribing agreement detailing terms of continued prescribing of controlled substances, including monitoring ZION reports, urine drug screening, and pill counts if necessary. The patient is aware that inappropriate use will results in cessation of prescribing such medications.    As the clinician, I personally reviewed the ZION from 11/6/23 while the patient was in the office today.    History and physical exam  exhibit continued safe and appropriate use of controlled substances.    Dictated utilizing Dragon dictation.

## 2023-12-06 ENCOUNTER — TELEPHONE (OUTPATIENT)
Dept: PAIN MEDICINE | Facility: CLINIC | Age: 73
End: 2023-12-06

## 2023-12-06 NOTE — TELEPHONE ENCOUNTER
"  Caller: Gallo Leary \"Nirmal\"    Relationship: Self    Best call back number: [phone]    What is the best time to reach you: ANYTIME BEFORE 6:00 PM     Who are you requesting to speak with (clinical staff, provider,  specific staff member): CLINICAL STAFF     Do you know the name of the person who called: PLEASE CALL BACK     What was the call regarding: WANTS A CALL REGARDING HIS INSURANCE AND HYDROCODONE  MG. DID NOT WANT TO GIVE ANY OTHER INFORMATION.     Is it okay if the provider responds through [employer]: NO          "

## 2023-12-07 DIAGNOSIS — M25.512 CHRONIC LEFT SHOULDER PAIN: ICD-10-CM

## 2023-12-07 DIAGNOSIS — G89.29 CHRONIC LEFT SHOULDER PAIN: ICD-10-CM

## 2023-12-07 RX ORDER — HYDROCODONE BITARTRATE AND ACETAMINOPHEN 10; 325 MG/1; MG/1
1 TABLET ORAL EVERY 4 HOURS PRN
Qty: 180 TABLET | Refills: 0 | Status: SHIPPED | OUTPATIENT
Start: 2023-12-07

## 2023-12-07 NOTE — TELEPHONE ENCOUNTER
I spoke with Mr. Leary and informed him that our office will no longer be taking his insurance and we have sent a referral to a new pain clinic for him. I also informed him that his refill on his pain medication has been sent to his [facility].

## 2023-12-19 ENCOUNTER — TELEPHONE (OUTPATIENT)
Dept: PAIN MEDICINE | Facility: CLINIC | Age: 73
End: 2023-12-19
Payer: COMMERCIAL

## 2023-12-19 DIAGNOSIS — M25.512 CHRONIC LEFT SHOULDER PAIN: ICD-10-CM

## 2023-12-19 DIAGNOSIS — G89.29 CHRONIC LEFT SHOULDER PAIN: ICD-10-CM

## 2023-12-19 RX ORDER — HYDROCODONE BITARTRATE AND ACETAMINOPHEN 10; 325 MG/1; MG/1
1 TABLET ORAL EVERY 4 HOURS PRN
Qty: 180 TABLET | Refills: 0 | Status: SHIPPED | OUTPATIENT
Start: 2023-12-19

## 2023-12-19 NOTE — TELEPHONE ENCOUNTER
Please see pt requesting refill. Pt is to call back so I can give him billing # he stated he is driving and is not able to write down #.

## 2023-12-19 NOTE — TELEPHONE ENCOUNTER
Provider: JOHN    Caller: PATIENT    Pharmacy: [facility]     Phone Number: [phone]    Reason for Call: PATIENT STATES THAT HIS INSURANCE DIDN'T COVER HIS LAST TWO OFFICE VISITS. HE STATES THAT HE HAS FILLED OUT AND SENT BACK A CONTINUITY OF CARE FORM, AND HE IS ASKING IF THOSE TWO VISITS CAN BE RESUBMITTED TO [facility]. HE STATES HIS INSURANCE WILL BE CHANGING AS OF 01/01/24.  HE IS ALSO ASKING IF HE CAN GET A REFILL ON HIS [employer]  SENT TO THE [facility] ON [address] [phone]. HE STATES HE HAS ENOUGH MEDICATION UNTIL 01/05 OR 01/06. PLEASE CALL HIM WITH ANY QUESTIONS

## 2024-02-06 ENCOUNTER — OFFICE VISIT (OUTPATIENT)
Dept: PAIN MEDICINE | Facility: CLINIC | Age: 74
End: 2024-02-06
Payer: COMMERCIAL

## 2024-02-06 VITALS
TEMPERATURE: 96.8 F | HEIGHT: 72 IN | WEIGHT: 239.8 LBS | BODY MASS INDEX: 32.48 KG/M2 | OXYGEN SATURATION: 97 % | SYSTOLIC BLOOD PRESSURE: 148 MMHG | HEART RATE: 91 BPM | RESPIRATION RATE: 18 BRPM | DIASTOLIC BLOOD PRESSURE: 80 MMHG

## 2024-02-06 DIAGNOSIS — G89.29 CHRONIC LEFT SHOULDER PAIN: ICD-10-CM

## 2024-02-06 DIAGNOSIS — Z79.899 HIGH RISK MEDICATION USE: ICD-10-CM

## 2024-02-06 DIAGNOSIS — G89.4 CHRONIC PAIN SYNDROME: Primary | ICD-10-CM

## 2024-02-06 DIAGNOSIS — M25.512 CHRONIC LEFT SHOULDER PAIN: ICD-10-CM

## 2024-02-06 PROCEDURE — 99213 OFFICE O/P EST LOW 20 MIN: CPT

## 2024-02-06 RX ORDER — HYDROCODONE BITARTRATE AND ACETAMINOPHEN 10; 325 MG/1; MG/1
1 TABLET ORAL EVERY 4 HOURS PRN
Qty: 180 TABLET | Refills: 0 | Status: SHIPPED | OUTPATIENT
Start: 2024-02-06

## 2024-02-06 NOTE — PROGRESS NOTES
"CHIEF COMPLAINT  Left shoulder pain    Subjective   Gallo Leary is a 73 y.o. male  who presents for follow-up.  He has a history of chronic shoulder pain. He reports that his pain has remained consistent since his last office visit.    Today pain is 7/10VAS in severity. Pain is located in his left shoulder. Describes this pain as nearly continuous ache. Pain is worsened by certain positions, weather changes, and overhead/heavy lifting. Pain improves with rest/reposition and medications. He has not tried heat or ice.     Continues with Hydrocodone 10mg 6/day. Denies any side effects from the regimen, including constipation and somnolence. The regimen helps decrease pain by 50%. He reports that pain medications helps \"keep pain at bay and allows me to work\". Notes improvement in activity and function with regimen. ADL's by self. Denies any bowel or bladder changes.     Shoulder Injury   The left shoulder is affected. The incident occurred more than 1 week ago. The injury mechanism was repetitive motion. The quality of the pain is described as aching (continuous). The pain does not radiate. The pain is at a severity of 7/10 (slighty worsened since last office visit). The pain is moderate. Pertinent negatives include no chest pain or numbness. The symptoms are aggravated by movement and overhead lifting. He has tried rest, immobilization, heat and ice (Hydrocodone) for the symptoms. The treatment provided mild relief.      PEG Assessment   What number best describes your pain on average in the past week?5-6  What number best describes how, during the past week, pain has interfered with your enjoyment of life?5  What number best describes how, during the past week, pain has interfered with your general activity?  5    The following portions of the patient's history were reviewed and updated as appropriate: allergies, current medications, past family history, past medical history, past social history, past " "surgical history, and problem list.    Review of Systems   Constitutional:  Negative for activity change and fatigue.   Cardiovascular:  Negative for chest pain.   Gastrointestinal:  Negative for abdominal pain, constipation and diarrhea.   Genitourinary:  Negative for difficulty urinating and dysuria.   Neurological:  Negative for dizziness, weakness, light-headedness, numbness and headaches.   Psychiatric/Behavioral:  Negative for agitation, sleep disturbance and suicidal ideas. The patient is not nervous/anxious.      I have reviewed and confirmed the accuracy of the ROS as documented by the MA/LPN/RN ADIS Rodriguez    Vitals:    02/06/24 1529   BP: 148/80   BP Location: Left arm   Patient Position: Sitting   Cuff Size: Large Adult   Pulse: 91   Resp: 18   Temp: 96.8 °F (36 °C)   SpO2: 97%   Weight: 109 kg (239 lb 12.8 oz)   Height: 182.9 cm (72.01\")   PainSc:   7   PainLoc: Shoulder     Objective   Physical Exam  Constitutional:       Appearance: Normal appearance.   HENT:      Head: Normocephalic.   Cardiovascular:      Rate and Rhythm: Normal rate and regular rhythm.   Pulmonary:      Effort: Pulmonary effort is normal.      Breath sounds: Normal breath sounds.   Musculoskeletal:      Left shoulder: Tenderness and crepitus present. Decreased range of motion.      Cervical back: Normal range of motion.   Skin:     General: Skin is warm and dry.      Capillary Refill: Capillary refill takes less than 2 seconds.   Neurological:      General: No focal deficit present.      Mental Status: He is alert and oriented to person, place, and time.   Psychiatric:         Mood and Affect: Mood normal.         Behavior: Behavior normal.         Thought Content: Thought content normal.         Cognition and Memory: Cognition normal.       Assessment & Plan   Diagnoses and all orders for this visit:    1. Chronic pain syndrome (Primary)    2. Chronic left shoulder pain  -     HYDROcodone-acetaminophen (NORCO)  MG " per tablet; Take 1 tablet by mouth Every 4 (Four) Hours As Needed for Severe Pain. 30 day supply.  Dispense: 180 tablet; Refill: 0    3. High risk medication use      Gallo Leary reports a pain score of 7.  Given his pain assessment as noted, treatment options were discussed and the following options were decided upon as a follow-up plan to address the patient's pain: continuation of current treatment plan for pain and prescription for opiod analgesics.    --- The urine drug screen confirmation from 9/5/23 has been reviewed and the result is appropriate based on patient history and ZION report  --- The patient signed an updated copy of the controlled substance agreement on 9/5/23  --- Continue Hydrocodone. Patient appears stable with current regimen. No adverse effects. Regarding continuation of opioids, there is no evidence of aberrant behavior or any red flags.  The patient continues with appropriate response to opioid therapy. ADL's remain intact by self.   --- Follow-up 1 month     ZION REPORT  As part of the patient's treatment plan, I am prescribing controlled substances. The patient has been made aware of appropriate use of such medications, including potential risk of somnolence, limited ability to drive and/or work safely, and the potential for dependence or overdose. It has also been made clear that these medications are for use by this patient only, without concomitant use of alcohol or other substances unless prescribed.     Patient has completed prescribing agreement detailing terms of continued prescribing of controlled substances, including monitoring ZION reports, urine drug screening, and pill counts if necessary. The patient is aware that inappropriate use will results in cessation of prescribing such medications.    As the clinician, I personally reviewed the ZION from 2/6/24 while the patient was in the office today.    History and physical exam exhibit continued safe and  appropriate use of controlled substances.    Dictated utilizing Dragon dictation.

## 2024-03-04 ENCOUNTER — OFFICE VISIT (OUTPATIENT)
Dept: PAIN MEDICINE | Facility: CLINIC | Age: 74
End: 2024-03-04
Payer: COMMERCIAL

## 2024-03-04 VITALS
OXYGEN SATURATION: 94 % | SYSTOLIC BLOOD PRESSURE: 136 MMHG | DIASTOLIC BLOOD PRESSURE: 69 MMHG | TEMPERATURE: 97.1 F | HEIGHT: 72 IN | BODY MASS INDEX: 32.07 KG/M2 | WEIGHT: 236.8 LBS | HEART RATE: 81 BPM | RESPIRATION RATE: 18 BRPM

## 2024-03-04 DIAGNOSIS — G89.29 CHRONIC LEFT SHOULDER PAIN: Primary | ICD-10-CM

## 2024-03-04 DIAGNOSIS — M25.512 CHRONIC LEFT SHOULDER PAIN: Primary | ICD-10-CM

## 2024-03-04 DIAGNOSIS — Z79.899 HIGH RISK MEDICATION USE: ICD-10-CM

## 2024-03-04 DIAGNOSIS — G89.4 CHRONIC PAIN SYNDROME: ICD-10-CM

## 2024-03-04 PROCEDURE — 80305 DRUG TEST PRSMV DIR OPT OBS: CPT

## 2024-03-04 PROCEDURE — 99213 OFFICE O/P EST LOW 20 MIN: CPT

## 2024-03-04 RX ORDER — HYDROCODONE BITARTRATE AND ACETAMINOPHEN 10; 325 MG/1; MG/1
1 TABLET ORAL EVERY 4 HOURS PRN
Qty: 180 TABLET | Refills: 0 | Status: SHIPPED | OUTPATIENT
Start: 2024-03-04

## 2024-03-04 NOTE — PROGRESS NOTES
"CHIEF COMPLAINT  Left shoulder pain    Subjective   Gallo Leary is a 74 y.o. male  who presents for follow-up.  He has a history of chronic left shoulder pain. He reports that his pain has remained consistent since his last office visit.     Today pain is 6/10VAS in severity. Pain is located in his left shoulder. Describes this pain as nearly continuous ache. Pain is worsened by certain positions, weather changes, and overhead/heavy lifting. Pain improves with rest/reposition and medications. He has not tried heat or ice.     Continues with Hydrocodone 10mg 6/day. Denies any side effects from the regimen, including constipation and somnolence. The regimen helps decrease pain by 50%. He reports that pain medications helps \"keep pain at bay and allows me to work\". Notes improvement in activity and function with regimen. ADL's by self. Denies any bowel or bladder changes.     Shoulder Injury   The left shoulder is affected. The incident occurred more than 1 week ago. The injury mechanism was repetitive motion. The quality of the pain is described as aching (continuous). The pain does not radiate. The pain is at a severity of 6/10 (severity in pain varies based on activity level). The pain is moderate. Pertinent negatives include no chest pain or numbness. The symptoms are aggravated by movement and overhead lifting. He has tried rest, immobilization, heat and ice (Hydrocodone) for the symptoms. The treatment provided mild relief.      PEG Assessment   What number best describes your pain on average in the past week?6  What number best describes how, during the past week, pain has interfered with your enjoyment of life?0  What number best describes how, during the past week, pain has interfered with your general activity?  0    The following portions of the patient's history were reviewed and updated as appropriate: allergies, current medications, past family history, past medical history, past social history, " "past surgical history, and problem list.    Review of Systems   Constitutional:  Negative for activity change and fatigue.   Cardiovascular:  Negative for chest pain.   Gastrointestinal:  Negative for abdominal pain, constipation and diarrhea.   Genitourinary:  Negative for difficulty urinating and dysuria.   Neurological:  Negative for dizziness, weakness, light-headedness, numbness and headaches.   Psychiatric/Behavioral:  Negative for agitation, sleep disturbance and suicidal ideas. The patient is not nervous/anxious.      I have reviewed and confirmed the accuracy of the ROS as documented by the MA/LPN/RN ADIS Rodriguez    Vitals:    03/04/24 1459   BP: 136/69   BP Location: Left arm   Patient Position: Sitting   Cuff Size: Large Adult   Pulse: 81   Resp: 18   Temp: 97.1 °F (36.2 °C)   SpO2: 94%   Weight: 107 kg (236 lb 12.8 oz)   Height: 182.9 cm (72.01\")   PainSc:   6   PainLoc: Shoulder     Objective   Physical Exam  Constitutional:       Appearance: Normal appearance.   HENT:      Head: Normocephalic.   Cardiovascular:      Rate and Rhythm: Normal rate and regular rhythm.   Pulmonary:      Effort: Pulmonary effort is normal.      Breath sounds: Normal breath sounds.   Musculoskeletal:      Left shoulder: Tenderness and crepitus present. Decreased range of motion.      Cervical back: Normal range of motion.   Skin:     General: Skin is warm and dry.      Capillary Refill: Capillary refill takes less than 2 seconds.   Neurological:      General: No focal deficit present.      Mental Status: He is alert and oriented to person, place, and time.   Psychiatric:         Mood and Affect: Mood normal.         Behavior: Behavior normal.         Thought Content: Thought content normal.         Cognition and Memory: Cognition normal.       Assessment & Plan   Diagnoses and all orders for this visit:    1. Chronic left shoulder pain (Primary)  -     HYDROcodone-acetaminophen (NORCO)  MG per tablet; Take 1 " tablet by mouth Every 4 (Four) Hours As Needed for Severe Pain. 30 day supply. DNF 3/7/24  Dispense: 180 tablet; Refill: 0    2. Chronic pain syndrome  -     HYDROcodone-acetaminophen (NORCO)  MG per tablet; Take 1 tablet by mouth Every 4 (Four) Hours As Needed for Severe Pain. 30 day supply. DNF 3/7/24  Dispense: 180 tablet; Refill: 0    3. High risk medication use      Gallo Leary reports a pain score of 6.  Given his pain assessment as noted, treatment options were discussed and the following options were decided upon as a follow-up plan to address the patient's pain: continuation of current treatment plan for pain and prescription for opiod analgesics.    --- Routine UDS in office today as part of monitoring requirements for controlled substances.  The specimen was viewed and the immunoassay result reviewed and is +OPI.  This specimen will be sent to [facility] laboratory for confirmation.     --- The patient signed an updated copy of the controlled substance agreement on 9/5/23  --- Continue Hydrocodone. DNF 3/7/24. Patient appears stable with current regimen. No adverse effects. Regarding continuation of opioids, there is no evidence of aberrant behavior or any red flags.  The patient continues with appropriate response to opioid therapy. ADL's remain intact by self.   --- Follow-up 1 month     ZION REPORT  As part of the patient's treatment plan, I am prescribing controlled substances. The patient has been made aware of appropriate use of such medications, including potential risk of somnolence, limited ability to drive and/or work safely, and the potential for dependence or overdose. It has also been made clear that these medications are for use by this patient only, without concomitant use of alcohol or other substances unless prescribed.     Patient has completed prescribing agreement detailing terms of continued prescribing of controlled substances, including monitoring ZION reports,  urine drug screening, and pill counts if necessary. The patient is aware that inappropriate use will results in cessation of prescribing such medications.    As the clinician, I personally reviewed the ZION from 3/4/24 while the patient was in the office today.    History and physical exam exhibit continued safe and appropriate use of controlled substances.    Dictated utilizing Dragon dictation.

## 2024-04-05 ENCOUNTER — OFFICE VISIT (OUTPATIENT)
Dept: PAIN MEDICINE | Facility: CLINIC | Age: 74
End: 2024-04-05
Payer: COMMERCIAL

## 2024-04-05 VITALS
HEIGHT: 72 IN | SYSTOLIC BLOOD PRESSURE: 139 MMHG | RESPIRATION RATE: 18 BRPM | DIASTOLIC BLOOD PRESSURE: 82 MMHG | BODY MASS INDEX: 32.02 KG/M2 | HEART RATE: 81 BPM | WEIGHT: 236.4 LBS | TEMPERATURE: 96.9 F | OXYGEN SATURATION: 98 %

## 2024-04-05 DIAGNOSIS — Z79.899 HIGH RISK MEDICATION USE: ICD-10-CM

## 2024-04-05 DIAGNOSIS — M25.512 CHRONIC LEFT SHOULDER PAIN: Primary | ICD-10-CM

## 2024-04-05 DIAGNOSIS — G89.4 CHRONIC PAIN SYNDROME: ICD-10-CM

## 2024-04-05 DIAGNOSIS — G89.29 CHRONIC LEFT SHOULDER PAIN: Primary | ICD-10-CM

## 2024-04-05 RX ORDER — HYDROCODONE BITARTRATE AND ACETAMINOPHEN 10; 325 MG/1; MG/1
1 TABLET ORAL EVERY 4 HOURS PRN
Qty: 180 TABLET | Refills: 0 | Status: SHIPPED | OUTPATIENT
Start: 2024-04-05

## 2024-04-05 NOTE — PROGRESS NOTES
"CHIEF COMPLAINT  Left shoulder pain    Subjective   Gallo Leary is a 74 y.o. male  who presents for follow-up.  He has a history of chronic left shoulder pain. He reports that his pain has remained consistent since his last office visit.    Today pain is 7/10VAS in severity. Pain is located in his left shoulder. Describes this pain as nearly continuous ache. Pain is worsened by certain positions, weather changes, and overhead/heavy lifting. Pain improves with rest/reposition and medications. He has not tried heat or ice.     Continues with Hydrocodone 10mg 6/day. Denies any side effects from the regimen, including constipation and somnolence. The regimen helps decrease pain by 50%. He reports that pain medications helps \"keep pain at bay and allows me to work\". Notes improvement in activity and function with regimen. ADL's by self. Denies any bowel or bladder changes.     Shoulder Injury   The left shoulder is affected. The incident occurred more than 1 week ago. The injury mechanism was repetitive motion. The quality of the pain is described as aching (continuous). The pain does not radiate. The pain is at a severity of 7/10 (severity in pain varies based on activity level). The pain is moderate. Pertinent negatives include no chest pain or numbness. The symptoms are aggravated by movement and overhead lifting. He has tried rest, immobilization, heat and ice (Hydrocodone) for the symptoms. The treatment provided mild relief.      PEG Assessment   What number best describes your pain on average in the past week?5  What number best describes how, during the past week, pain has interfered with your enjoyment of life?0  What number best describes how, during the past week, pain has interfered with your general activity?  0    The following portions of the patient's history were reviewed and updated as appropriate: allergies, current medications, past family history, past medical history, past social history, " "past surgical history, and problem list.    Review of Systems   Constitutional:  Negative for activity change, fatigue and fever.   HENT:  Negative for congestion.    Respiratory:  Negative for cough and chest tightness.    Cardiovascular:  Negative for chest pain.   Gastrointestinal:  Negative for abdominal pain, constipation and diarrhea.   Genitourinary:  Negative for difficulty urinating and dysuria.   Neurological:  Negative for dizziness, weakness, light-headedness, numbness and headaches.   Psychiatric/Behavioral:  Negative for agitation, sleep disturbance and suicidal ideas. The patient is not nervous/anxious.      I have reviewed and confirmed the accuracy of the ROS as documented by the MA/LPN/RN ADIS Rodriguez    Vitals:    04/05/24 1457   BP: 139/82   BP Location: Left arm   Patient Position: Sitting   Cuff Size: Large Adult   Pulse: 81   Resp: 18   Temp: 96.9 °F (36.1 °C)   TempSrc: Temporal   SpO2: 98%   Weight: 107 kg (236 lb 6.4 oz)   Height: 182.9 cm (72.01\")   PainSc:   7     Objective   Physical Exam  Constitutional:       Appearance: Normal appearance.   HENT:      Head: Normocephalic.   Cardiovascular:      Rate and Rhythm: Normal rate and regular rhythm.   Pulmonary:      Effort: Pulmonary effort is normal.      Breath sounds: Normal breath sounds.   Musculoskeletal:      Left shoulder: Tenderness and crepitus present. Decreased range of motion.      Cervical back: Normal range of motion.   Skin:     General: Skin is warm and dry.      Capillary Refill: Capillary refill takes less than 2 seconds.   Neurological:      General: No focal deficit present.      Mental Status: He is alert and oriented to person, place, and time.   Psychiatric:         Mood and Affect: Mood normal.         Behavior: Behavior normal.         Thought Content: Thought content normal.         Cognition and Memory: Cognition normal.       Assessment & Plan   Diagnoses and all orders for this visit:    1. Chronic " left shoulder pain (Primary)  -     HYDROcodone-acetaminophen (NORCO)  MG per tablet; Take 1 tablet by mouth Every 4 (Four) Hours As Needed for Severe Pain. 30 day supply.  Dispense: 180 tablet; Refill: 0    2. Chronic pain syndrome  -     HYDROcodone-acetaminophen (NORCO)  MG per tablet; Take 1 tablet by mouth Every 4 (Four) Hours As Needed for Severe Pain. 30 day supply.  Dispense: 180 tablet; Refill: 0    3. High risk medication use      Gallo Leary reports a pain score of 7.  Given his pain assessment as noted, treatment options were discussed and the following options were decided upon as a follow-up plan to address the patient's pain: continuation of current treatment plan for pain and prescription for opiod analgesics.    --- The urine drug screen confirmation from 3/4/24 has been reviewed and the result is appropriate based on patient history and ZION report  --- The patient signed an updated copy of the controlled substance agreement on 9/5/23  --- Continue Hydrocodone. Ok to fill today. Patient appears stable with current regimen. No adverse effects. Regarding continuation of opioids, there is no evidence of aberrant behavior or any red flags.  The patient continues with appropriate response to opioid therapy. ADL's remain intact by self.   --- Follow-up 1 month     ZION REPORT  As part of the patient's treatment plan, I am prescribing controlled substances. The patient has been made aware of appropriate use of such medications, including potential risk of somnolence, limited ability to drive and/or work safely, and the potential for dependence or overdose. It has also been made clear that these medications are for use by this patient only, without concomitant use of alcohol or other substances unless prescribed.     Patient has completed prescribing agreement detailing terms of continued prescribing of controlled substances, including monitoring ZION reports, urine drug  screening, and pill counts if necessary. The patient is aware that inappropriate use will results in cessation of prescribing such medications.    As the clinician, I personally reviewed the ZION from 4/5/24 while the patient was in the office today.    History and physical exam exhibit continued safe and appropriate use of controlled substances.    Dictated utilizing Dragon dictation.

## 2024-05-06 ENCOUNTER — OFFICE VISIT (OUTPATIENT)
Dept: PAIN MEDICINE | Facility: CLINIC | Age: 74
End: 2024-05-06
Payer: COMMERCIAL

## 2024-05-06 VITALS
BODY MASS INDEX: 31.56 KG/M2 | DIASTOLIC BLOOD PRESSURE: 85 MMHG | SYSTOLIC BLOOD PRESSURE: 149 MMHG | OXYGEN SATURATION: 98 % | HEART RATE: 78 BPM | HEIGHT: 72 IN | WEIGHT: 233 LBS | TEMPERATURE: 96.9 F

## 2024-05-06 DIAGNOSIS — Z79.899 HIGH RISK MEDICATION USE: ICD-10-CM

## 2024-05-06 DIAGNOSIS — M25.512 CHRONIC LEFT SHOULDER PAIN: Primary | ICD-10-CM

## 2024-05-06 DIAGNOSIS — G89.29 CHRONIC LEFT SHOULDER PAIN: Primary | ICD-10-CM

## 2024-05-06 DIAGNOSIS — G89.4 CHRONIC PAIN SYNDROME: ICD-10-CM

## 2024-05-06 PROCEDURE — 99213 OFFICE O/P EST LOW 20 MIN: CPT

## 2024-05-06 RX ORDER — HYDROCODONE BITARTRATE AND ACETAMINOPHEN 10; 325 MG/1; MG/1
1 TABLET ORAL EVERY 4 HOURS PRN
Qty: 180 TABLET | Refills: 0 | Status: SHIPPED | OUTPATIENT
Start: 2024-05-06

## 2024-06-03 ENCOUNTER — OFFICE VISIT (OUTPATIENT)
Dept: PAIN MEDICINE | Facility: CLINIC | Age: 74
End: 2024-06-03
Payer: COMMERCIAL

## 2024-06-03 VITALS
TEMPERATURE: 96.9 F | RESPIRATION RATE: 18 BRPM | OXYGEN SATURATION: 96 % | HEART RATE: 88 BPM | BODY MASS INDEX: 30.99 KG/M2 | WEIGHT: 228.8 LBS | SYSTOLIC BLOOD PRESSURE: 149 MMHG | HEIGHT: 72 IN | DIASTOLIC BLOOD PRESSURE: 78 MMHG

## 2024-06-03 DIAGNOSIS — G89.29 CHRONIC LEFT SHOULDER PAIN: Primary | ICD-10-CM

## 2024-06-03 DIAGNOSIS — M25.512 CHRONIC LEFT SHOULDER PAIN: Primary | ICD-10-CM

## 2024-06-03 DIAGNOSIS — G89.4 CHRONIC PAIN SYNDROME: ICD-10-CM

## 2024-06-03 PROCEDURE — 99213 OFFICE O/P EST LOW 20 MIN: CPT

## 2024-06-03 RX ORDER — HYDROCODONE BITARTRATE AND ACETAMINOPHEN 10; 325 MG/1; MG/1
1 TABLET ORAL EVERY 4 HOURS PRN
Qty: 180 TABLET | Refills: 0 | Status: SHIPPED | OUTPATIENT
Start: 2024-06-03

## 2024-06-03 RX ORDER — METHYLPREDNISOLONE 4 MG/1
TABLET ORAL
Qty: 21 TABLET | Refills: 0 | Status: SHIPPED | OUTPATIENT
Start: 2024-06-03

## 2024-06-03 NOTE — PROGRESS NOTES
CHIEF COMPLAINT  Left shoulder pain    Subjective   Gallo Leary is a 74 y.o. male  who presents for follow-up.  He has a history of chronic left pain.  He reports that his pain is slightly worsened since his last office visit due to working around his house.  He states he had to crawl into his house to fix a water leak which caused increased shoulder and bilateral hip pain.    Today pain is 7/10VAS in severity. Pain is located in his left shoulder. Describes this pain as nearly continuous ache. Pain is worsened by certain positions, weather changes, and overhead/heavy lifting. Pain improves with rest/reposition and medications. He has not tried heat or ice.     Continues with Hydrocodone 10mg 6/day. Denies any side effects from the regimen, including constipation and somnolence. The regimen helps decrease pain by 50%. He reports that pain medications allows him to remain active and continue working. Notes improvement in activity and function with regimen. ADL's by self. Denies any bowel or bladder changes.      Shoulder Injury   The left shoulder is affected. The incident occurred more than 1 week ago. The injury mechanism was repetitive motion. The quality of the pain is described as aching (continuous). The pain does not radiate. The pain is at a severity of 6/10 (severity in pain varies based on activity level). The pain is moderate. Pertinent negatives include no chest pain or numbness. The symptoms are aggravated by movement and overhead lifting. He has tried rest, immobilization, heat and ice (Hydrocodone) for the symptoms. The treatment provided mild relief.     PEG Assessment   What number best describes your pain on average in the past week?7  What number best describes how, during the past week, pain has interfered with your enjoyment of life?0  What number best describes how, during the past week, pain has interfered with your general activity?  7    Review of Pertinent Medical Data ---        The  "following portions of the patient's history were reviewed and updated as appropriate: allergies, current medications, past family history, past medical history, past social history, past surgical history, and problem list.    Review of Systems   Constitutional:  Negative for activity change (more), fatigue and fever.   Respiratory:  Negative for cough and chest tightness.    Cardiovascular:  Negative for chest pain.   Gastrointestinal:  Negative for abdominal pain, constipation and diarrhea.   Genitourinary:  Negative for difficulty urinating and dysuria.   Neurological:  Negative for dizziness, weakness, light-headedness, numbness and headaches.   Psychiatric/Behavioral:  Negative for agitation, sleep disturbance and suicidal ideas. The patient is not nervous/anxious.      I have reviewed and confirmed the accuracy of the ROS as documented by the MA/LPN/RN ADIS Rodriguez    Vitals:    06/03/24 1530   BP: 149/78   BP Location: Right arm   Patient Position: Sitting   Cuff Size: Large Adult   Pulse: 88   Resp: 18   Temp: 96.9 °F (36.1 °C)   TempSrc: Temporal   SpO2: 96%   Weight: 104 kg (228 lb 12.8 oz)   Height: 182.9 cm (72.01\")   PainSc:   7     Objective   Physical Exam  Constitutional:       Appearance: Normal appearance.   HENT:      Head: Normocephalic.   Cardiovascular:      Rate and Rhythm: Normal rate and regular rhythm.   Pulmonary:      Effort: Pulmonary effort is normal.      Breath sounds: Normal breath sounds.   Musculoskeletal:      Left shoulder: Tenderness and crepitus present. Decreased range of motion.      Cervical back: Normal range of motion.      Right hip: Tenderness present.      Left hip: Tenderness present.   Skin:     General: Skin is warm and dry.      Capillary Refill: Capillary refill takes less than 2 seconds.   Neurological:      General: No focal deficit present.      Mental Status: He is alert and oriented to person, place, and time.   Psychiatric:         Mood and Affect: " Mood normal.         Behavior: Behavior normal.         Thought Content: Thought content normal.         Cognition and Memory: Cognition normal.       Assessment & Plan   Diagnoses and all orders for this visit:    1. Chronic left shoulder pain (Primary)  -     methylPREDNISolone (MEDROL) 4 MG dose pack; Take as directed on package instructions.  Dispense: 21 tablet; Refill: 0  -     HYDROcodone-acetaminophen (NORCO)  MG per tablet; Take 1 tablet by mouth Every 4 (Four) Hours As Needed for Severe Pain. 30 day supply. DNF 6/4/24  Dispense: 180 tablet; Refill: 0    2. Chronic pain syndrome  -     methylPREDNISolone (MEDROL) 4 MG dose pack; Take as directed on package instructions.  Dispense: 21 tablet; Refill: 0  -     HYDROcodone-acetaminophen (NORCO)  MG per tablet; Take 1 tablet by mouth Every 4 (Four) Hours As Needed for Severe Pain. 30 day supply. DNF 6/4/24  Dispense: 180 tablet; Refill: 0      Gallo L Stribbling reports a pain score of 7.  Given his pain assessment as noted, treatment options were discussed and the following options were decided upon as a follow-up plan to address the patient's pain: continuation of current treatment plan for pain and prescription for opiod analgesics.    --- The urine drug screen confirmation from 3/4/24 has been reviewed and the result is appropriate based on patient history and ZION report  --- The patient signed an updated copy of the controlled substance agreement on 9/5/23  --- MDP to pharmacy for flare in pain  --- Continue Hydrocodone. DNF 6/4/24. Patient appears stable with current regimen. No adverse effects. Regarding continuation of opioids, there is no evidence of aberrant behavior or any red flags. The patient continues with appropriate response to opioid therapy. ADL's remain intact by self.   --- Follow-up 1 month     ZION REPORT  As part of the patient's treatment plan, I am prescribing controlled substances. The patient has been made aware of  appropriate use of such medications, including potential risk of somnolence, limited ability to drive and/or work safely, and the potential for dependence or overdose. It has also been made clear that these medications are for use by this patient only, without concomitant use of alcohol or other substances unless prescribed.     Patient has completed prescribing agreement detailing terms of continued prescribing of controlled substances, including monitoring ZION reports, urine drug screening, and pill counts if necessary. The patient is aware that inappropriate use will results in cessation of prescribing such medications.    As the clinician, I personally reviewed the ZION from 6/3/24 while the patient was in the office today.    History and physical exam exhibit continued safe and appropriate use of controlled substances.    Dictated utilizing Dragon dictation.

## 2024-06-27 ENCOUNTER — OFFICE VISIT (OUTPATIENT)
Dept: PAIN MEDICINE | Facility: CLINIC | Age: 74
End: 2024-06-27
Payer: COMMERCIAL

## 2024-06-27 VITALS
RESPIRATION RATE: 18 BRPM | BODY MASS INDEX: 30.85 KG/M2 | WEIGHT: 227.8 LBS | HEIGHT: 72 IN | SYSTOLIC BLOOD PRESSURE: 136 MMHG | OXYGEN SATURATION: 96 % | HEART RATE: 83 BPM | DIASTOLIC BLOOD PRESSURE: 62 MMHG | TEMPERATURE: 96.9 F

## 2024-06-27 DIAGNOSIS — M25.512 CHRONIC LEFT SHOULDER PAIN: Primary | ICD-10-CM

## 2024-06-27 DIAGNOSIS — G89.4 CHRONIC PAIN SYNDROME: ICD-10-CM

## 2024-06-27 DIAGNOSIS — Z79.899 HIGH RISK MEDICATION USE: ICD-10-CM

## 2024-06-27 DIAGNOSIS — G89.29 CHRONIC LEFT SHOULDER PAIN: Primary | ICD-10-CM

## 2024-06-27 PROCEDURE — 99213 OFFICE O/P EST LOW 20 MIN: CPT

## 2024-06-27 RX ORDER — HYDROCODONE BITARTRATE AND ACETAMINOPHEN 10; 325 MG/1; MG/1
1 TABLET ORAL EVERY 4 HOURS PRN
Qty: 180 TABLET | Refills: 0 | Status: SHIPPED | OUTPATIENT
Start: 2024-06-27

## 2024-06-27 NOTE — PROGRESS NOTES
CHIEF COMPLAINT  Left shoulder pain     Subjective   Gallo Leary is a 74 y.o. male  who presents for follow-up.  He has a history of chronic left pain.  He reports that his pain has remained since his last office.      Today pain is 7/10VAS in severity. Pain is located in his left shoulder. Describes this pain as nearly continuous ache. Pain is worsened by certain positions, weather changes, and overhead/heavy lifting. Pain improves with rest/reposition and medications.      Continues with Hydrocodone 10mg 6/day. Denies any side effects from the regimen, including constipation and somnolence. The regimen helps decrease pain by 50%. He reports that pain medications allows him to remain active and continue working. 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 Notes improvement in activity and function with regimen. ADL's by self. Denies any bowel or bladder changes.     Shoulder Injury   The left shoulder is affected. The incident occurred more than 1 week ago. The injury mechanism was repetitive motion. The quality of the pain is described as aching (continuous). The pain does not radiate. The pain is at a severity of 7/10 (severity in pain varies based on activity level). The pain is moderate. Pertinent negatives include no chest pain or numbness. The symptoms are aggravated by movement and overhead lifting. He has tried rest, immobilization, heat and ice (Hydrocodone) for the symptoms. The treatment provided mild relief.      PEG Assessment   What number best describes your pain on average in the past week?5  What number best describes how, during the past week, pain has interfered with your enjoyment of life?0  What number best describes how, during the past week, pain has interfered with your general activity?  0    Review of Pertinent Medical Data ---        The following portions of the patient's history were reviewed and updated as appropriate: allergies, current medications, past family history, past  "medical history, past social history, past surgical history, and problem list.    Review of Systems   Constitutional:  Negative for activity change, fatigue and fever.   HENT:  Positive for congestion.    Respiratory:  Positive for cough. Negative for chest tightness.    Cardiovascular:  Negative for chest pain.   Gastrointestinal:  Negative for constipation and diarrhea.   Genitourinary:  Negative for difficulty urinating and dysuria.   Neurological:  Negative for dizziness, weakness, light-headedness, numbness and headaches.   Psychiatric/Behavioral:  Negative for agitation, sleep disturbance and suicidal ideas. The patient is not nervous/anxious.      I have reviewed and confirmed the accuracy of the ROS as documented by the MA/LPN/RN ADIS Rodriguez    Vitals:    06/27/24 1536   BP: 136/62   BP Location: Left arm   Patient Position: Sitting   Cuff Size: Large Adult   Pulse: 83   Resp: 18   Temp: 96.9 °F (36.1 °C)   TempSrc: Temporal   SpO2: 96%   Weight: 103 kg (227 lb 12.8 oz)   Height: 182.9 cm (72.01\")   PainSc:   7     Objective   Physical Exam  Constitutional:       Appearance: Normal appearance.   HENT:      Head: Normocephalic.   Cardiovascular:      Rate and Rhythm: Normal rate and regular rhythm.   Pulmonary:      Effort: Pulmonary effort is normal.      Breath sounds: Normal breath sounds.   Musculoskeletal:      Left shoulder: Tenderness and crepitus present. Decreased range of motion.      Cervical back: Normal range of motion.      Right hip: Tenderness present.      Left hip: Tenderness present.   Skin:     General: Skin is warm and dry.      Capillary Refill: Capillary refill takes less than 2 seconds.   Neurological:      General: No focal deficit present.      Mental Status: He is alert and oriented to person, place, and time.   Psychiatric:         Mood and Affect: Mood normal.         Behavior: Behavior normal.         Thought Content: Thought content normal.         Cognition and " Memory: Cognition normal.       Assessment & Plan   Diagnoses and all orders for this visit:    1. Chronic left shoulder pain (Primary)  -     HYDROcodone-acetaminophen (NORCO)  MG per tablet; Take 1 tablet by mouth Every 4 (Four) Hours As Needed for Severe Pain. 30 day supply. DNF 7/4/24  Dispense: 180 tablet; Refill: 0    2. Chronic pain syndrome  -     HYDROcodone-acetaminophen (NORCO)  MG per tablet; Take 1 tablet by mouth Every 4 (Four) Hours As Needed for Severe Pain. 30 day supply. DNF 7/4/24  Dispense: 180 tablet; Refill: 0    3. High risk medication use      Gallo Leary reports a pain score of 7.  Given his pain assessment as noted, treatment options were discussed and the following options were decided upon as a follow-up plan to address the patient's pain: continuation of current treatment plan for pain and prescription for opiod analgesics.    --- The urine drug screen confirmation from 3/4/24 has been reviewed and the result is appropriate based on patient history and ZION report  --- The patient signed an updated copy of the controlled substance agreement on 9/5/23  --- Continue Hydrocodone. DNF 7/4/24. Patient appears stable with current regimen. No adverse effects. Regarding continuation of opioids, there is no evidence of aberrant behavior or any red flags. The patient continues with appropriate response to opioid therapy. ADL's remain intact by self.   --- Follow-up 1 month     [facility]ION REPORT  As part of the patient's treatment plan, I am prescribing controlled substances. The patient has been made aware of appropriate use of such medications, including potential risk of somnolence, limited ability to drive and/or work safely, and the potential for dependence or overdose. It has also been made clear that these medications are for use by this patient only, without concomitant use of alcohol or other substances unless prescribed.     Patient has completed prescribing agreement  detailing terms of continued prescribing of controlled substances, including monitoring ZION reports, urine drug screening, and pill counts if necessary. The patient is aware that inappropriate use will results in cessation of prescribing such medications.    As the clinician, I personally reviewed the ZION from 6/27/24 while the patient was in the office today.    History and physical exam exhibit continued safe and appropriate use of controlled substances.    Dictated utilizing Dragon dictation.

## 2024-08-01 ENCOUNTER — OFFICE VISIT (OUTPATIENT)
Dept: PAIN MEDICINE | Facility: CLINIC | Age: 74
End: 2024-08-01
Payer: COMMERCIAL

## 2024-08-01 VITALS
SYSTOLIC BLOOD PRESSURE: 130 MMHG | DIASTOLIC BLOOD PRESSURE: 77 MMHG | TEMPERATURE: 96.9 F | WEIGHT: 229.8 LBS | BODY MASS INDEX: 31.13 KG/M2 | OXYGEN SATURATION: 94 % | RESPIRATION RATE: 18 BRPM | HEIGHT: 72 IN | HEART RATE: 80 BPM

## 2024-08-01 DIAGNOSIS — G89.4 CHRONIC PAIN SYNDROME: ICD-10-CM

## 2024-08-01 DIAGNOSIS — Z79.899 HIGH RISK MEDICATION USE: ICD-10-CM

## 2024-08-01 DIAGNOSIS — M25.512 CHRONIC LEFT SHOULDER PAIN: Primary | ICD-10-CM

## 2024-08-01 DIAGNOSIS — G89.29 CHRONIC LEFT SHOULDER PAIN: Primary | ICD-10-CM

## 2024-08-01 RX ORDER — HYDROCODONE BITARTRATE AND ACETAMINOPHEN 10; 325 MG/1; MG/1
1 TABLET ORAL EVERY 4 HOURS PRN
Qty: 180 TABLET | Refills: 0 | Status: SHIPPED | OUTPATIENT
Start: 2024-08-01

## 2024-08-01 NOTE — PROGRESS NOTES
CHIEF COMPLAINT  Shoulder pain    Subjective   Gallo Leary is a 74 y.o. male  who presents for follow-up.  He has a history of left pain. He reports that his pain level has fluctuated since his last office visit.      Today pain is 6/10VAS in severity. Pain is located in his left shoulder. Describes this pain as nearly continuous ache. Pain is worsened by certain positions, weather changes, and overhead/heavy lifting. Pain improves with rest/reposition and medications.      Continues with Hydrocodone 10mg 6/day. Denies any side effects from the regimen, including constipation and somnolence. The regimen helps decrease pain by 50%. He reports that pain medications allows him to remain active and continue working. 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 Notes improvement in activity and function with regimen. ADL's by self. Denies any bowel or bladder changes.     Shoulder Injury   The left shoulder is affected. The incident occurred more than 1 week ago. The injury mechanism was repetitive motion. The quality of the pain is described as aching (continuous). The pain does not radiate. The pain is at a severity of 6/10 (severity in pain varies based on activity level). The pain is moderate. Pertinent negatives include no chest pain or numbness. The symptoms are aggravated by movement and overhead lifting. He has tried rest, immobilization, heat and ice (Hydrocodone) for the symptoms. The treatment provided mild relief.     PEG Assessment   What number best describes your pain on average in the past week?5  What number best describes how, during the past week, pain has interfered with your enjoyment of life?0  What number best describes how, during the past week, pain has interfered with your general activity?  0    Review of Pertinent Medical Data ---        The following portions of the patient's history were reviewed and updated as appropriate: allergies, current medications, past family history, past medical  "history, past social history, past surgical history, and problem list.    Review of Systems   Cardiovascular:  Negative for chest pain.   Gastrointestinal:  Negative for constipation and diarrhea.   Genitourinary:  Negative for difficulty urinating.   Musculoskeletal:  Positive for arthralgias (left shoulder).   Neurological:  Negative for weakness and numbness.   Psychiatric/Behavioral:  Negative for sleep disturbance and suicidal ideas. The patient is not nervous/anxious.      I have reviewed and confirmed the accuracy of the ROS as documented by the MA/LPN/RN ADIS Rodriguez    Vitals:    08/01/24 1506   BP: 130/77   Pulse: 80   Resp: 18   Temp: 96.9 °F (36.1 °C)   SpO2: 94%   Weight: 104 kg (229 lb 12.8 oz)   Height: 182.9 cm (72.01\")   PainSc:   6   PainLoc: Shoulder     Objective   Physical Exam  Constitutional:       Appearance: Normal appearance.   HENT:      Head: Normocephalic.   Cardiovascular:      Rate and Rhythm: Normal rate and regular rhythm.   Pulmonary:      Effort: Pulmonary effort is normal.      Breath sounds: Normal breath sounds.   Musculoskeletal:      Left shoulder: Tenderness and crepitus present. Decreased range of motion.      Cervical back: Normal range of motion.      Right hip: Tenderness present.      Left hip: Tenderness present.   Skin:     General: Skin is warm and dry.      Capillary Refill: Capillary refill takes less than 2 seconds.   Neurological:      General: No focal deficit present.      Mental Status: He is alert and oriented to person, place, and time.   Psychiatric:         Mood and Affect: Mood normal.         Behavior: Behavior normal.         Thought Content: Thought content normal.         Cognition and Memory: Cognition normal.       Assessment & Plan   Diagnoses and all orders for this visit:    1. Chronic left shoulder pain (Primary)  -     HYDROcodone-acetaminophen (NORCO)  MG per tablet; Take 1 tablet by mouth Every 4 (Four) Hours As Needed for " Severe Pain. 30 day supply. DNF 8/3/24  Dispense: 180 tablet; Refill: 0    2. Chronic pain syndrome  -     HYDROcodone-acetaminophen (NORCO)  MG per tablet; Take 1 tablet by mouth Every 4 (Four) Hours As Needed for Severe Pain. 30 day supply. DNF 8/3/24  Dispense: 180 tablet; Refill: 0    3. High risk medication use      Gallo Leary reports a pain score of 6.  Given his pain assessment as noted, treatment options were discussed and the following options were decided upon as a follow-up plan to address the patient's pain: continuation of current treatment plan for pain and prescription for opiod analgesics.    --- The urine drug screen confirmation from 3/4/24 has been reviewed and the result is appropriate based on patient history and [facility]ION report  --- The patient signed an updated copy of the controlled substance agreement on 9/5/23  --- Continue Hydrocodone. DNF 8/3/24. Patient appears stable with current regimen. No adverse effects. Regarding continuation of opioids, there is no evidence of aberrant behavior or any red flags. The patient continues with appropriate response to opioid therapy. ADL's remain intact by self.   --- Follow-up 1 month     [facility] REPORT  As part of the patient's treatment plan, I am prescribing controlled substances. The patient has been made aware of appropriate use of such medications, including potential risk of somnolence, limited ability to drive and/or work safely, and the potential for dependence or overdose. It has also been made clear that these medications are for use by this patient only, without concomitant use of alcohol or other substances unless prescribed.     Patient has completed prescribing agreement detailing terms of continued prescribing of controlled substances, including monitoring ZION reports, urine drug screening, and pill counts if necessary. The patient is aware that inappropriate use will results in cessation of prescribing such  medications.    As the clinician, I personally reviewed the ZION from 8/1/24 while the patient was in the office today.    History and physical exam exhibit continued safe and appropriate use of controlled substances.    Dictated utilizing Dragon dictation.

## 2024-08-30 ENCOUNTER — OFFICE VISIT (OUTPATIENT)
Dept: PAIN MEDICINE | Facility: CLINIC | Age: 74
End: 2024-08-30
Payer: COMMERCIAL

## 2024-08-30 VITALS
WEIGHT: 228.8 LBS | BODY MASS INDEX: 30.99 KG/M2 | DIASTOLIC BLOOD PRESSURE: 83 MMHG | OXYGEN SATURATION: 95 % | SYSTOLIC BLOOD PRESSURE: 153 MMHG | HEIGHT: 72 IN | TEMPERATURE: 98.6 F | RESPIRATION RATE: 16 BRPM | HEART RATE: 79 BPM

## 2024-08-30 DIAGNOSIS — Z79.899 HIGH RISK MEDICATION USE: ICD-10-CM

## 2024-08-30 DIAGNOSIS — G89.29 CHRONIC LEFT SHOULDER PAIN: Primary | ICD-10-CM

## 2024-08-30 DIAGNOSIS — G89.4 CHRONIC PAIN SYNDROME: ICD-10-CM

## 2024-08-30 DIAGNOSIS — M25.512 CHRONIC LEFT SHOULDER PAIN: Primary | ICD-10-CM

## 2024-08-30 RX ORDER — HYDROCODONE BITARTRATE AND ACETAMINOPHEN 10; 325 MG/1; MG/1
1 TABLET ORAL EVERY 4 HOURS PRN
Qty: 180 TABLET | Refills: 0 | Status: SHIPPED | OUTPATIENT
Start: 2024-08-30

## 2024-08-30 NOTE — PROGRESS NOTES
CHIEF COMPLAINT  Shoulder pain    Subjective   Gallo Leary is a 74 y.o. male  who presents for follow-up.  He has a history of left shoulder pain. He reports that his pain has remained consistent since his last office visit.    Today pain is 6/10VAS in severity. Pain is located in his left shoulder. Describes this pain as nearly continuous ache. Pain is worsened by certain positions, weather changes, and overhead/heavy lifting. Pain improves with rest/reposition and medications.      Continues with Hydrocodone 10mg 6/day. Denies any side effects from the regimen, including constipation and somnolence. The regimen helps decrease pain by 50%. He reports that pain medications allows him to remain active and continue working. 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 Notes improvement in activity and function with regimen. ADL's by self. Denies any bowel or bladder changes.     Shoulder Injury   The left shoulder is affected. The incident occurred more than 1 week ago. The injury mechanism was repetitive motion. The quality of the pain is described as aching (continuous). The pain does not radiate. The pain is at a severity of 6/10 (severity in pain varies based on activity level). The pain is moderate. Pertinent negatives include no chest pain or numbness. The symptoms are aggravated by movement and overhead lifting. He has tried rest, immobilization, heat and ice (Hydrocodone) for the symptoms. The treatment provided mild relief.      PEG Assessment   What number best describes your pain on average in the past week?7  What number best describes how, during the past week, pain has interfered with your enjoyment of life?0  What number best describes how, during the past week, pain has interfered with your general activity?  0    Review of Pertinent Medical Data ---      The following portions of the patient's history were reviewed and updated as appropriate: allergies, current medications, past family history,  "past medical history, past social history, past surgical history, and problem list.    Review of Systems   Constitutional:  Negative for activity change, fatigue and fever.   Respiratory:  Negative for cough and chest tightness.    Cardiovascular:  Negative for chest pain.   Gastrointestinal:  Negative for abdominal pain, constipation and diarrhea.   Genitourinary:  Negative for difficulty urinating and dysuria.   Neurological:  Positive for headaches. Negative for dizziness, weakness, light-headedness and numbness.   Psychiatric/Behavioral:  Negative for agitation and sleep disturbance. The patient is not nervous/anxious.        I have reviewed and confirmed the accuracy of the ROS as documented by the MA/LPN/RN ADIS Rodriguez    Vitals:    08/30/24 1543   BP: 153/83   BP Location: Left arm   Patient Position: Sitting   Cuff Size: Adult   Pulse: 79   Resp: 16   Temp: 98.6 °F (37 °C)   TempSrc: Temporal   SpO2: 95%   Weight: 104 kg (228 lb 12.8 oz)   Height: 182.9 cm (72.01\")   PainSc:   6     Objective   Physical Exam  Constitutional:       Appearance: Normal appearance.   HENT:      Head: Normocephalic.   Cardiovascular:      Rate and Rhythm: Normal rate and regular rhythm.   Pulmonary:      Effort: Pulmonary effort is normal.      Breath sounds: Normal breath sounds.   Musculoskeletal:      Left shoulder: Tenderness and crepitus present. Decreased range of motion.      Cervical back: Normal range of motion.      Right hip: Tenderness present.      Left hip: Tenderness present.   Skin:     General: Skin is warm and dry.      Capillary Refill: Capillary refill takes less than 2 seconds.   Neurological:      General: No focal deficit present.      Mental Status: He is alert and oriented to person, place, and time.   Psychiatric:         Mood and Affect: Mood normal.         Behavior: Behavior normal.         Thought Content: Thought content normal.         Cognition and Memory: Cognition normal. "       Assessment & Plan   Diagnoses and all orders for this visit:    1. Chronic left shoulder pain (Primary)  -     HYDROcodone-acetaminophen (NORCO)  MG per tablet; Take 1 tablet by mouth Every 4 (Four) Hours As Needed for Severe Pain. 30 day supply. DNF 9/2/24  Dispense: 180 tablet; Refill: 0    2. Chronic pain syndrome  -     HYDROcodone-acetaminophen (NORCO)  MG per tablet; Take 1 tablet by mouth Every 4 (Four) Hours As Needed for Severe Pain. 30 day supply. DNF 9/2/24  Dispense: 180 tablet; Refill: 0    3. High risk medication use      Gallo Leary reports a pain score of 6.  Given his pain assessment as noted, treatment options were discussed and the following options were decided upon as a follow-up plan to address the patient's pain: continuation of current treatment plan for pain and prescription for opiod analgesics.    --- The urine drug screen confirmation from 3/4/24 has been reviewed and the result is appropriate based on patient history and ZION report  --- The patient signed an updated copy of the controlled substance agreement on 8/30/24  --- Continue Hydrocodone. DNF 9/2/24. Patient appears stable with current regimen. No adverse effects. Regarding continuation of opioids, there is no evidence of aberrant behavior or any red flags. The patient continues with appropriate response to opioid therapy. ADL's remain intact by self.   --- Follow-up 1 month       [facility]ION REPORT  As part of the patient's treatment plan, I am prescribing controlled substances. The patient has been made aware of appropriate use of such medications, including potential risk of somnolence, limited ability to drive and/or work safely, and the potential for dependence or overdose. It has also been made clear that these medications are for use by this patient only, without concomitant use of alcohol or other substances unless prescribed.     Patient has completed prescribing agreement detailing terms of  continued prescribing of controlled substances, including monitoring ZION reports, urine drug screening, and pill counts if necessary. The patient is aware that inappropriate use will results in cessation of prescribing such medications.    As the clinician, I personally reviewed the ZION from 8/30/24 while the patient was in the office today.    History and physical exam exhibit continued safe and appropriate use of controlled substances.    Dictated utilizing Dragon dictation.

## 2024-09-30 ENCOUNTER — OFFICE VISIT (OUTPATIENT)
Dept: PAIN MEDICINE | Facility: CLINIC | Age: 74
End: 2024-09-30
Payer: COMMERCIAL

## 2024-09-30 VITALS
HEART RATE: 78 BPM | WEIGHT: 230 LBS | OXYGEN SATURATION: 95 % | BODY MASS INDEX: 31.15 KG/M2 | DIASTOLIC BLOOD PRESSURE: 82 MMHG | SYSTOLIC BLOOD PRESSURE: 128 MMHG | TEMPERATURE: 96.6 F | HEIGHT: 72 IN

## 2024-09-30 DIAGNOSIS — Z79.899 HIGH RISK MEDICATION USE: Primary | ICD-10-CM

## 2024-09-30 DIAGNOSIS — G89.29 CHRONIC LEFT SHOULDER PAIN: ICD-10-CM

## 2024-09-30 DIAGNOSIS — G89.4 CHRONIC PAIN SYNDROME: ICD-10-CM

## 2024-09-30 DIAGNOSIS — M25.512 CHRONIC LEFT SHOULDER PAIN: ICD-10-CM

## 2024-09-30 PROCEDURE — 99213 OFFICE O/P EST LOW 20 MIN: CPT

## 2024-09-30 PROCEDURE — 80305 DRUG TEST PRSMV DIR OPT OBS: CPT

## 2024-09-30 RX ORDER — HYDROCODONE BITARTRATE AND ACETAMINOPHEN 10; 325 MG/1; MG/1
1 TABLET ORAL EVERY 4 HOURS PRN
Qty: 180 TABLET | Refills: 0 | Status: SHIPPED | OUTPATIENT
Start: 2024-09-30

## 2024-09-30 NOTE — PROGRESS NOTES
CHIEF COMPLAINT  Shoulder pain    Subjective   Gallo Leary is a 74 y.o. male  who presents for follow-up.  He has a history of left shoulder pain. He reports that his pain level has fluctuated since his last office visit.     Today pain is 5/10VAS in severity. Pain is located in his left shoulder. Describes this pain as nearly continuous ache. Pain is worsened by certain positions, weather changes, and overhead/heavy lifting. Pain improves with rest/reposition and medications.      Continues with Hydrocodone 10mg 6/day. Denies any side effects from the regimen, including constipation and somnolence. The regimen helps decrease pain by 50%. He reports that pain medications allows him to remain active and continue working. 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 Notes improvement in activity and function with regimen. ADL's by self. Denies any bowel or bladder changes.      Shoulder Injury   The left shoulder is affected. The incident occurred more than 1 week ago. The injury mechanism was repetitive motion. The quality of the pain is described as aching (continuous). The pain does not radiate. The pain is at a severity of 5/10 (severity in pain varies based on activity level). The pain is moderate. Pertinent negatives include no chest pain or numbness. The symptoms are aggravated by movement and overhead lifting. He has tried rest, immobilization, heat and ice (Hydrocodone) for the symptoms. The treatment provided mild relief.     PEG Assessment   What number best describes your pain on average in the past week?6  What number best describes how, during the past week, pain has interfered with your enjoyment of life?2  What number best describes how, during the past week, pain has interfered with your general activity?  0    The following portions of the patient's history were reviewed and updated as appropriate: allergies, current medications, past family history, past medical history, past social history, past  "surgical history, and problem list.    Review of Systems   Constitutional:  Negative for chills and fever.   Respiratory:  Positive for cough (mild). Negative for shortness of breath.    Cardiovascular:  Negative for chest pain.   Gastrointestinal:  Negative for constipation and diarrhea.   Genitourinary:  Negative for difficulty urinating.   Neurological:  Negative for dizziness, weakness, light-headedness and numbness.   Psychiatric/Behavioral:  Negative for agitation.      I have reviewed and confirmed the accuracy of the ROS as documented by the MA/LPN/RN ADIS Rodriguez    Vitals:    09/30/24 1537   BP: 128/82   BP Location: Left arm   Patient Position: Sitting   Pulse: 78   Temp: 96.6 °F (35.9 °C)   TempSrc: Temporal   SpO2: 95%   Weight: 104 kg (230 lb)   Height: 182.9 cm (72.01\")   PainSc:   5   PainLoc: Shoulder     Objective   Physical Exam  Constitutional:       Appearance: Normal appearance.   HENT:      Head: Normocephalic.   Cardiovascular:      Rate and Rhythm: Normal rate and regular rhythm.   Pulmonary:      Effort: Pulmonary effort is normal.      Breath sounds: Normal breath sounds.   Musculoskeletal:      Left shoulder: Tenderness and crepitus present. Decreased range of motion.      Cervical back: Normal range of motion.      Right hip: Tenderness present.      Left hip: Tenderness present.   Skin:     General: Skin is warm and dry.      Capillary Refill: Capillary refill takes less than 2 seconds.   Neurological:      General: No focal deficit present.      Mental Status: He is alert and oriented to person, place, and time.   Psychiatric:         Mood and Affect: Mood normal.         Behavior: Behavior normal.         Thought Content: Thought content normal.         Cognition and Memory: Cognition normal.       Assessment & Plan   Diagnoses and all orders for this visit:    1. High risk medication use (Primary)    2. Chronic left shoulder pain  -     HYDROcodone-acetaminophen (NORCO) "  MG per tablet; Take 1 tablet by mouth Every 4 (Four) Hours As Needed for Severe Pain. 30 day supply. DNF 10/2/24  Dispense: 180 tablet; Refill: 0    3. Chronic pain syndrome  -     HYDROcodone-acetaminophen (NORCO)  MG per tablet; Take 1 tablet by mouth Every 4 (Four) Hours As Needed for Severe Pain. 30 day supply. DNF 10/2/24  Dispense: 180 tablet; Refill: 0      Gallo L Stribbling reports a pain score of 5.  Given his pain assessment as noted, treatment options were discussed and the following options were decided upon as a follow-up plan to address the patient's pain: continuation of current treatment plan for pain and prescription for opiod analgesics.    --- Routine UDS in office today as part of monitoring requirements for controlled substances.  The specimen was viewed and the immunoassay result reviewed and is +OPI.  This specimen will be sent to [facility] laboratory for confirmation.     --- The patient signed an updated copy of the controlled substance agreement on 8/30/24  --- Continue Hydrocodone. DNF 10/2/24. Patient appears stable with current regimen. No adverse effects. Regarding continuation of opioids, there is no evidence of aberrant behavior or any red flags. The patient continues with appropriate response to opioid therapy. ADL's remain intact by self.   --- Follow-up 1 month       ZION REPORT  As part of the patient's treatment plan, I am prescribing controlled substances. The patient has been made aware of appropriate use of such medications, including potential risk of somnolence, limited ability to drive and/or work safely, and the potential for dependence or overdose. It has also been made clear that these medications are for use by this patient only, without concomitant use of alcohol or other substances unless prescribed.     Patient has completed prescribing agreement detailing terms of continued prescribing of controlled substances, including monitoring ZION reports,  urine drug screening, and pill counts if necessary. The patient is aware that inappropriate use will results in cessation of prescribing such medications.    As the clinician, I personally reviewed the ZION from 9/30/24 while the patient was in the office today.    History and physical exam exhibit continued safe and appropriate use of controlled substances.    Dictated utilizing Dragon dictation.

## 2024-11-01 ENCOUNTER — OFFICE VISIT (OUTPATIENT)
Dept: PAIN MEDICINE | Facility: CLINIC | Age: 74
End: 2024-11-01
Payer: COMMERCIAL

## 2024-11-01 VITALS
BODY MASS INDEX: 30.94 KG/M2 | HEART RATE: 77 BPM | WEIGHT: 228.4 LBS | HEIGHT: 72 IN | TEMPERATURE: 96.3 F | SYSTOLIC BLOOD PRESSURE: 127 MMHG | OXYGEN SATURATION: 96 % | RESPIRATION RATE: 20 BRPM | DIASTOLIC BLOOD PRESSURE: 77 MMHG

## 2024-11-01 DIAGNOSIS — G89.29 CHRONIC LEFT SHOULDER PAIN: ICD-10-CM

## 2024-11-01 DIAGNOSIS — M25.512 CHRONIC LEFT SHOULDER PAIN: ICD-10-CM

## 2024-11-01 DIAGNOSIS — G89.4 CHRONIC PAIN SYNDROME: ICD-10-CM

## 2024-11-01 DIAGNOSIS — Z79.899 HIGH RISK MEDICATION USE: Primary | ICD-10-CM

## 2024-11-01 RX ORDER — HYDROCODONE BITARTRATE AND ACETAMINOPHEN 10; 325 MG/1; MG/1
1 TABLET ORAL EVERY 4 HOURS PRN
Qty: 180 TABLET | Refills: 0 | Status: SHIPPED | OUTPATIENT
Start: 2024-11-01

## 2024-11-01 NOTE — PROGRESS NOTES
CHIEF COMPLAINT  Shoulder pain     Subjective   Gallo Leary is a 74 y.o. male  who presents for follow-up.  He has a history of chronic right shoulder pain. He reports that his pain has slightly worsened since his last office visit but increased physical activity and doing things around his house.     Today pain is 8/10VAS in severity. Pain is located in his left shoulder. Describes this pain as nearly continuous ache. Pain is worsened by certain positions, weather changes, and overhead/heavy lifting. Pain improves with rest/reposition and medications.      Continues with Hydrocodone 10mg 6/day. Denies any side effects from the regimen, including constipation and somnolence. The regimen helps decrease pain by 50%. He reports that pain medications allows him to remain active and continue working. 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 Notes improvement in activity and function with regimen. ADL's by self. Denies any bowel or bladder changes.     Shoulder Injury   The left shoulder is affected. The incident occurred more than 1 week ago. The injury mechanism was repetitive motion. The quality of the pain is described as aching (continuous). The pain does not radiate. The pain is at a severity of 8/10 (severity in pain varies based on activity level). The pain is moderate. Pertinent negatives include no chest pain or numbness. The symptoms are aggravated by movement and overhead lifting. He has tried rest, immobilization, heat and ice (Hydrocodone) for the symptoms. The treatment provided mild relief.      PEG Assessment   What number best describes your pain on average in the past week?7  What number best describes how, during the past week, pain has interfered with your enjoyment of life?0  What number best describes how, during the past week, pain has interfered with your general activity?  0    Review of Pertinent Medical Data ---          The following portions of the patient's history were reviewed and  "updated as appropriate: allergies, current medications, past family history, past medical history, past social history, past surgical history, and problem list.    Review of Systems   Constitutional:  Negative for activity change, fatigue and fever.   Respiratory:  Negative for cough and chest tightness.    Cardiovascular:  Negative for chest pain.   Gastrointestinal:  Negative for abdominal pain, constipation and diarrhea.   Neurological:  Negative for dizziness, weakness, light-headedness, numbness and headaches.   Psychiatric/Behavioral:  Negative for agitation, sleep disturbance and suicidal ideas. The patient is not nervous/anxious.        I have reviewed and confirmed the accuracy of the ROS as documented by the MA/LPN/RN ADIS Rodriguez    Vitals:    11/01/24 1525   BP: 127/77   BP Location: Right arm   Patient Position: Sitting   Cuff Size: Large Adult   Pulse: 77   Resp: 20   Temp: 96.3 °F (35.7 °C)   TempSrc: Temporal   SpO2: 96%   Weight: 104 kg (228 lb 6.4 oz)   Height: 182.9 cm (72.01\")   PainSc:   8     Objective   Physical Exam  Constitutional:       Appearance: Normal appearance.   HENT:      Head: Normocephalic.   Cardiovascular:      Rate and Rhythm: Normal rate and regular rhythm.   Pulmonary:      Effort: Pulmonary effort is normal.      Breath sounds: Normal breath sounds.   Musculoskeletal:      Left shoulder: Tenderness and crepitus present. Decreased range of motion.      Cervical back: Normal range of motion.      Right hip: Tenderness present.      Left hip: Tenderness present.   Skin:     General: Skin is warm and dry.      Capillary Refill: Capillary refill takes less than 2 seconds.   Neurological:      General: No focal deficit present.      Mental Status: He is alert and oriented to person, place, and time.   Psychiatric:         Mood and Affect: Mood normal.         Behavior: Behavior normal.         Thought Content: Thought content normal.         Cognition and Memory: " Cognition normal.       Assessment & Plan   Diagnoses and all orders for this visit:    1. High risk medication use (Primary)    2. Chronic left shoulder pain  -     HYDROcodone-acetaminophen (NORCO)  MG per tablet; Take 1 tablet by mouth Every 4 (Four) Hours As Needed for Severe Pain. 30 day supply.  Dispense: 180 tablet; Refill: 0    3. Chronic pain syndrome  -     HYDROcodone-acetaminophen (NORCO)  MG per tablet; Take 1 tablet by mouth Every 4 (Four) Hours As Needed for Severe Pain. 30 day supply.  Dispense: 180 tablet; Refill: 0      Gallo GUEVARA Stribbahmet reports a pain score of 8.  Given his pain assessment as noted, treatment options were discussed and the following options were decided upon as a follow-up plan to address the patient's pain: continuation of current treatment plan for pain and prescription for opiod analgesics.    --- The urine drug screen confirmation from 9/30/24 has been reviewed and the result is appropriate based on patient history and ZION report  --- The patient signed an updated copy of the controlled substance agreement on 8/30/24  --- Continue Hydrocodone. DNF 10/2/24. Patient appears stable with current regimen. No adverse effects. Regarding continuation of opioids, there is no evidence of aberrant behavior or any red flags. The patient continues with appropriate response to opioid therapy. ADL's remain intact by self.   --- Follow-up 1 month     ZION REPORT  As part of the patient's treatment plan, I am prescribing controlled substances. The patient has been made aware of appropriate use of such medications, including potential risk of somnolence, limited ability to drive and/or work safely, and the potential for dependence or overdose. It has also been made clear that these medications are for use by this patient only, without concomitant use of alcohol or other substances unless prescribed.     Patient has completed prescribing agreement detailing terms of continued  prescribing of controlled substances, including monitoring ZION reports, urine drug screening, and pill counts if necessary. The patient is aware that inappropriate use will results in cessation of prescribing such medications.    As the clinician, I personally reviewed the ZION from 11/1/24 while the patient was in the office today.    History and physical exam exhibit continued safe and appropriate use of controlled substances.    Dictated utilizing Dragon dictation.

## 2024-11-22 ENCOUNTER — OFFICE VISIT (OUTPATIENT)
Dept: PAIN MEDICINE | Facility: CLINIC | Age: 74
End: 2024-11-22
Payer: COMMERCIAL

## 2024-11-22 VITALS
WEIGHT: 227.8 LBS | HEART RATE: 87 BPM | TEMPERATURE: 97.4 F | DIASTOLIC BLOOD PRESSURE: 84 MMHG | BODY MASS INDEX: 30.85 KG/M2 | SYSTOLIC BLOOD PRESSURE: 145 MMHG | HEIGHT: 72 IN | RESPIRATION RATE: 20 BRPM | OXYGEN SATURATION: 98 %

## 2024-11-22 DIAGNOSIS — G89.4 CHRONIC PAIN SYNDROME: ICD-10-CM

## 2024-11-22 DIAGNOSIS — G89.29 CHRONIC LEFT SHOULDER PAIN: Primary | ICD-10-CM

## 2024-11-22 DIAGNOSIS — M25.512 CHRONIC LEFT SHOULDER PAIN: Primary | ICD-10-CM

## 2024-11-22 DIAGNOSIS — Z79.899 HIGH RISK MEDICATION USE: ICD-10-CM

## 2024-11-22 RX ORDER — HYDROCODONE BITARTRATE AND ACETAMINOPHEN 10; 325 MG/1; MG/1
1 TABLET ORAL EVERY 4 HOURS PRN
Qty: 180 TABLET | Refills: 0 | Status: SHIPPED | OUTPATIENT
Start: 2024-11-22

## 2024-11-22 NOTE — PROGRESS NOTES
CHIEF COMPLAINT  Shoulder pain    Subjective   Gallo Leary is a 74 y.o. male  who presents for follow-up.  He has a history of chronic right shoulder pain. He reports that is pain has been consistent since his last office visit.     Today pain is 5/10VAS in severity. Pain is located in his left shoulder. Describes this pain as nearly continuous ache. Continues with Hydrocodone 10mg 6/day. Denies any side effects from the regimen, including constipation and somnolence. The regimen helps decrease pain by 50%. He reports that pain medications allows him to remain active and continue working. 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 Notes improvement in activity and function with regimen. ADL's by self. Denies any bowel or bladder changes.     Shoulder Injury   The left shoulder is affected. The incident occurred more than 1 week ago. The injury mechanism was repetitive motion. The quality of the pain is described as aching (continuous). The pain does not radiate. The pain is at a severity of 5/10 (severity in pain varies based on activity level). The pain is moderate. Pertinent negatives include no chest pain or numbness. The symptoms are aggravated by movement and overhead lifting. He has tried rest, immobilization, heat and ice (Hydrocodone) for the symptoms. The treatment provided mild relief.      PEG Assessment   What number best describes your pain on average in the past week?7  What number best describes how, during the past week, pain has interfered with your enjoyment of life?0  What number best describes how, during the past week, pain has interfered with your general activity?  0    Review of Pertinent Medical Data ---      The following portions of the patient's history were reviewed and updated as appropriate: allergies, current medications, past family history, past medical history, past social history, past surgical history, and problem list.    Review of Systems   Constitutional:  Negative for  "activity change, fatigue and fever.   Respiratory:  Negative for cough, chest tightness and shortness of breath.    Cardiovascular:  Negative for chest pain.   Gastrointestinal:  Negative for abdominal pain, constipation and diarrhea.   Neurological:  Positive for headaches. Negative for dizziness, weakness, light-headedness and numbness.   Psychiatric/Behavioral:  Negative for agitation, sleep disturbance and suicidal ideas. The patient is not nervous/anxious.      I have reviewed and confirmed the accuracy of the ROS as documented by the MA/LPN/RN ADIS Rodriguez    Vitals:    11/22/24 1518   BP: 145/84   BP Location: Right arm   Patient Position: Sitting   Cuff Size: Large Adult   Pulse: 87   Resp: 20   Temp: 97.4 °F (36.3 °C)   TempSrc: Temporal   SpO2: 98%   Weight: 103 kg (227 lb 12.8 oz)   Height: 182.9 cm (72.01\")   PainSc:   5     Objective   Physical Exam  Constitutional:       Appearance: Normal appearance.   HENT:      Head: Normocephalic.   Cardiovascular:      Rate and Rhythm: Normal rate and regular rhythm.   Pulmonary:      Effort: Pulmonary effort is normal.      Breath sounds: Normal breath sounds.   Musculoskeletal:      Left shoulder: Tenderness and crepitus present. Decreased range of motion.      Cervical back: Normal range of motion.      Right hip: Tenderness present.      Left hip: Tenderness present.   Skin:     General: Skin is warm and dry.      Capillary Refill: Capillary refill takes less than 2 seconds.   Neurological:      General: No focal deficit present.      Mental Status: He is alert and oriented to person, place, and time.   Psychiatric:         Mood and Affect: Mood normal.         Behavior: Behavior normal.         Thought Content: Thought content normal.         Cognition and Memory: Cognition normal.       Assessment & Plan   Diagnoses and all orders for this visit:    1. Chronic left shoulder pain (Primary)  -     HYDROcodone-acetaminophen (NORCO)  MG per " tablet; Take 1 tablet by mouth Every 4 (Four) Hours As Needed for Severe Pain. 30 day supply. DNF 12/1/24  Dispense: 180 tablet; Refill: 0    2. Chronic pain syndrome  -     HYDROcodone-acetaminophen (NORCO)  MG per tablet; Take 1 tablet by mouth Every 4 (Four) Hours As Needed for Severe Pain. 30 day supply. DNF 12/1/24  Dispense: 180 tablet; Refill: 0    3. High risk medication use      Gallo Leary reports a pain score of 5.  Given his pain assessment as noted, treatment options were discussed and the following options were decided upon as a follow-up plan to address the patient's pain: continuation of current treatment plan for pain and prescription for opiod analgesics.    --- The urine drug screen confirmation from 9/30/24 has been reviewed and the result is appropriate based on patient history and ZION report  --- The patient signed an updated copy of the controlled substance agreement on 8/30/24  --- Continue Hydrocodone. DNF 12/1/24. Patient appears stable with current regimen. No adverse effects. Regarding continuation of opioids, there is no evidence of aberrant behavior or any red flags. The patient continues with appropriate response to opioid therapy. ADL's remain intact by self.   --- Follow-up 1 month     ZION REPORT  As part of the patient's treatment plan, I am prescribing controlled substances. The patient has been made aware of appropriate use of such medications, including potential risk of somnolence, limited ability to drive and/or work safely, and the potential for dependence or overdose. It has also been made clear that these medications are for use by this patient only, without concomitant use of alcohol or other substances unless prescribed.     Patient has completed prescribing agreement detailing terms of continued prescribing of controlled substances, including monitoring ZION reports, urine drug screening, and pill counts if necessary. The patient is aware that  inappropriate use will results in cessation of prescribing such medications.    As the clinician, I personally reviewed the ZION from 11/22/24 while the patient was in the office today.    History and physical exam exhibit continued safe and appropriate use of controlled substances.    Dictated utilizing Dragon dictation.

## 2024-12-26 ENCOUNTER — OFFICE VISIT (OUTPATIENT)
Dept: PAIN MEDICINE | Facility: CLINIC | Age: 74
End: 2024-12-26
Payer: COMMERCIAL

## 2024-12-26 VITALS
RESPIRATION RATE: 20 BRPM | HEART RATE: 80 BPM | DIASTOLIC BLOOD PRESSURE: 71 MMHG | TEMPERATURE: 97.1 F | HEIGHT: 72 IN | WEIGHT: 230.2 LBS | OXYGEN SATURATION: 95 % | BODY MASS INDEX: 31.18 KG/M2 | SYSTOLIC BLOOD PRESSURE: 140 MMHG

## 2024-12-26 DIAGNOSIS — G89.29 CHRONIC LEFT SHOULDER PAIN: Primary | ICD-10-CM

## 2024-12-26 DIAGNOSIS — M25.512 CHRONIC LEFT SHOULDER PAIN: Primary | ICD-10-CM

## 2024-12-26 DIAGNOSIS — Z79.899 HIGH RISK MEDICATION USE: ICD-10-CM

## 2024-12-26 DIAGNOSIS — G89.4 CHRONIC PAIN SYNDROME: ICD-10-CM

## 2024-12-26 RX ORDER — HYDROCODONE BITARTRATE AND ACETAMINOPHEN 10; 325 MG/1; MG/1
1 TABLET ORAL EVERY 4 HOURS PRN
Qty: 180 TABLET | Refills: 0 | Status: SHIPPED | OUTPATIENT
Start: 2024-12-26

## 2024-12-26 NOTE — PROGRESS NOTES
CHIEF COMPLAINT  Shoulder pain    Subjective   Gallo Leary is a 74 y.o. male  who presents for follow-up.  He has a history of chronic right shoulder pain. He reports that is pain level has fluctuated since his last office visit.    Today pain is 6/10VAS in severity. Pain is located in his left shoulder. Describes this pain as nearly continuous ache. Continues with Hydrocodone 10mg 6/day. Denies any side effects from the regimen, including constipation and somnolence. The regimen helps decrease pain by 50%. He reports that pain medications allows him to remain active and continue working. 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 Notes improvement in activity and function with regimen. ADL's by self. Denies any bowel or bladder changes.     Shoulder Injury   The left shoulder is affected. The incident occurred more than 1 week ago. The injury mechanism was repetitive motion. The quality of the pain is described as aching (continuous). The pain does not radiate. The pain is at a severity of 6/10 (severity in pain varies based on activity level). The pain is moderate. Pertinent negatives include no chest pain or numbness. The symptoms are aggravated by movement and overhead lifting. He has tried rest, immobilization, heat and ice (Hydrocodone) for the symptoms. The treatment provided mild relief.      PEG Assessment   What number best describes your pain on average in the past week?6  What number best describes how, during the past week, pain has interfered with your enjoyment of life?0  What number best describes how, during the past week, pain has interfered with your general activity?  0    Review of Pertinent Medical Data ---      The following portions of the patient's history were reviewed and updated as appropriate: allergies, current medications, past family history, past medical history, past social history, past surgical history, and problem list.    Review of Systems   Constitutional:  Negative for  "activity change, fatigue and fever.   Respiratory:  Negative for cough and chest tightness.    Cardiovascular:  Negative for chest pain.   Gastrointestinal:  Negative for abdominal pain, constipation and diarrhea.   Neurological:  Negative for dizziness, weakness, light-headedness, numbness and headaches.   Psychiatric/Behavioral:  Negative for agitation, sleep disturbance and suicidal ideas. The patient is not nervous/anxious.        I have reviewed and confirmed the accuracy of the ROS as documented by the MA/LPN/RN ADIS Rodriguez    Vitals:    12/26/24 1535   BP: 140/71   BP Location: Left arm   Patient Position: Sitting   Cuff Size: Adult   Pulse: 80   Resp: 20   Temp: 97.1 °F (36.2 °C)   TempSrc: Temporal   SpO2: 95%   Weight: 104 kg (230 lb 3.2 oz)   Height: 182.9 cm (72.01\")   PainSc:   6     Objective   Physical Exam  Constitutional:       Appearance: Normal appearance.   HENT:      Head: Normocephalic.   Cardiovascular:      Rate and Rhythm: Normal rate and regular rhythm.   Pulmonary:      Effort: Pulmonary effort is normal.      Breath sounds: Normal breath sounds.   Musculoskeletal:      Left shoulder: Tenderness and crepitus present. Decreased range of motion.      Cervical back: Normal range of motion.      Right hip: Tenderness present.      Left hip: Tenderness present.   Skin:     General: Skin is warm and dry.      Capillary Refill: Capillary refill takes less than 2 seconds.   Neurological:      General: No focal deficit present.      Mental Status: He is alert and oriented to person, place, and time.   Psychiatric:         Mood and Affect: Mood normal.         Behavior: Behavior normal.         Thought Content: Thought content normal.         Cognition and Memory: Cognition normal.       Assessment & Plan   Diagnoses and all orders for this visit:    1. Chronic left shoulder pain (Primary)  -     HYDROcodone-acetaminophen (NORCO)  MG per tablet; Take 1 tablet by mouth Every 4 (Four) " Hours As Needed for Severe Pain. 30 day supply. DNF 12/31/24  Dispense: 180 tablet; Refill: 0    2. Chronic pain syndrome  -     HYDROcodone-acetaminophen (NORCO)  MG per tablet; Take 1 tablet by mouth Every 4 (Four) Hours As Needed for Severe Pain. 30 day supply. DNF 12/31/24  Dispense: 180 tablet; Refill: 0    3. High risk medication use      Gallo Leary reports a pain score of 6.  Given his pain assessment as noted, treatment options were discussed and the following options were decided upon as a follow-up plan to address the patient's pain: .  continuation of current treatment plan for pain and prescription for opiod analgesics    --- The urine drug screen confirmation from 9/30/24 has been reviewed and the result is appropriate based on patient history and ZION report  --- The patient signed an updated copy of the controlled substance agreement on 8/30/24  --- Continue Hydrocodone. DNF 12/31/24. Patient appears stable with current regimen. No adverse effects. Regarding continuation of opioids, there is no evidence of aberrant behavior or any red flags. The patient continues with appropriate response to opioid therapy. ADL's remain intact by self.   --- Follow-up 1 month     ZION REPORT  As part of the patient's treatment plan, I am prescribing controlled substances. The patient has been made aware of appropriate use of such medications, including potential risk of somnolence, limited ability to drive and/or work safely, and the potential for dependence or overdose. It has also been made clear that these medications are for use by this patient only, without concomitant use of alcohol or other substances unless prescribed.     Patient has completed prescribing agreement detailing terms of continued prescribing of controlled substances, including monitoring ZION reports, urine drug screening, and pill counts if necessary. The patient is aware that inappropriate use will results in cessation of  prescribing such medications.    As the clinician, I personally reviewed the ZION from 12/26/24 while the patient was in the office today.    History and physical exam exhibit continued safe and appropriate use of controlled substances.    Dictated utilizing Dragon dictation.

## 2025-01-24 ENCOUNTER — OFFICE VISIT (OUTPATIENT)
Dept: PAIN MEDICINE | Facility: CLINIC | Age: 75
End: 2025-01-24
Payer: COMMERCIAL

## 2025-01-24 VITALS
SYSTOLIC BLOOD PRESSURE: 152 MMHG | RESPIRATION RATE: 20 BRPM | OXYGEN SATURATION: 98 % | BODY MASS INDEX: 31.34 KG/M2 | TEMPERATURE: 95.3 F | HEIGHT: 72 IN | DIASTOLIC BLOOD PRESSURE: 77 MMHG | HEART RATE: 76 BPM | WEIGHT: 231.4 LBS

## 2025-01-24 DIAGNOSIS — M25.512 CHRONIC LEFT SHOULDER PAIN: Primary | ICD-10-CM

## 2025-01-24 DIAGNOSIS — G89.29 CHRONIC LEFT SHOULDER PAIN: Primary | ICD-10-CM

## 2025-01-24 DIAGNOSIS — Z79.899 HIGH RISK MEDICATION USE: ICD-10-CM

## 2025-01-24 DIAGNOSIS — G89.4 CHRONIC PAIN SYNDROME: ICD-10-CM

## 2025-01-24 RX ORDER — HYDROCODONE BITARTRATE AND ACETAMINOPHEN 10; 325 MG/1; MG/1
1 TABLET ORAL EVERY 4 HOURS PRN
Qty: 180 TABLET | Refills: 0 | Status: SHIPPED | OUTPATIENT
Start: 2025-01-24

## 2025-01-24 NOTE — PROGRESS NOTES
CHIEF COMPLAINT  Shoulder pain    Subjective   Gallo Leary is a 74 y.o. male  who presents for follow-up.  He has a history of chronic right shoulder pain. He reports that is pain has remained consistent since his last office visit and varies based on activity level.      Today pain is 6/10VAS in severity. Pain is located in his left shoulder. Describes this pain as nearly continuous ache. Continues with Hydrocodone 10mg 6/day. Denies any side effects from the regimen, including constipation and somnolence. The regimen helps decrease pain by 50%. He reports that pain medications allows him to remain active and continue working. He enjoys playing [employer]. Notes improvement in activity and function with regimen. ADL's by self. Denies any bowel or bladder changes.     Shoulder Injury   The left shoulder is affected. The incident occurred more than 1 week ago. The injury mechanism was repetitive motion. The quality of the pain is described as aching (continuous). The pain does not radiate. The pain is at a severity of 6/10 (pain level has remained consistent since his last office visit). The pain is moderate. Pertinent negatives include no chest pain or numbness. The symptoms are aggravated by movement and overhead lifting. He has tried rest, immobilization, heat and ice (Hydrocodone) for the symptoms. The treatment provided mild relief.      PEG Assessment   What number best describes your pain on average in the past week?6  What number best describes how, during the past week, pain has interfered with your enjoyment of life?0  What number best describes how, during the past week, pain has interfered with your general activity?  0    Review of Pertinent Medical Data ---        The following portions of the patient's history were reviewed and updated as appropriate: allergies, current medications, past family history, past medical history, past social history, past surgical history, and problem list.    Review of  "Systems   Constitutional:  Negative for activity change, fatigue and fever.   Respiratory:  Negative for cough and chest tightness.    Cardiovascular:  Negative for chest pain.   Gastrointestinal:  Negative for abdominal pain, constipation and diarrhea.   Neurological:  Negative for dizziness, weakness, light-headedness, numbness and headaches.   Psychiatric/Behavioral:  Negative for agitation, sleep disturbance and suicidal ideas. The patient is not nervous/anxious.      I have reviewed and confirmed the accuracy of the ROS as documented by the MA/LPN/RN ADIS Rodriguez    Vitals:    01/24/25 1528   BP: 152/77   BP Location: Right arm   Patient Position: Sitting   Cuff Size: Large Adult   Pulse: 76   Resp: 20   Temp: 95.3 °F (35.2 °C)   TempSrc: Temporal   SpO2: 98%   Weight: 105 kg (231 lb 6.4 oz)   Height: 182.9 cm (72.01\")   PainSc:   6     Objective   Physical Exam  Constitutional:       Appearance: Normal appearance.   HENT:      Head: Normocephalic.   Cardiovascular:      Rate and Rhythm: Normal rate and regular rhythm.   Pulmonary:      Effort: Pulmonary effort is normal.      Breath sounds: Normal breath sounds.   Musculoskeletal:      Left shoulder: Tenderness and crepitus present. Decreased range of motion.      Cervical back: Normal range of motion.      Right hip: Tenderness present.      Left hip: Tenderness present.   Skin:     General: Skin is warm and dry.      Capillary Refill: Capillary refill takes less than 2 seconds.   Neurological:      General: No focal deficit present.      Mental Status: He is alert and oriented to person, place, and time.   Psychiatric:         Mood and Affect: Mood normal.         Behavior: Behavior normal.         Thought Content: Thought content normal.         Cognition and Memory: Cognition normal.       Assessment & Plan   Diagnoses and all orders for this visit:    1. Chronic left shoulder pain (Primary)  -     HYDROcodone-acetaminophen (NORCO)  MG per " tablet; Take 1 tablet by mouth Every 4 (Four) Hours As Needed for Severe Pain. 30 day supply. DNF 1/30/25  Dispense: 180 tablet; Refill: 0    2. Chronic pain syndrome  -     HYDROcodone-acetaminophen (NORCO)  MG per tablet; Take 1 tablet by mouth Every 4 (Four) Hours As Needed for Severe Pain. 30 day supply. DNF 1/30/25  Dispense: 180 tablet; Refill: 0    3. High risk medication use      Gallo Leary reports a pain score of 6.  Given his pain assessment as noted, treatment options were discussed and the following options were decided upon as a follow-up plan to address the patient's pain: continuation of current treatment plan for pain and prescription for opiod analgesics.    --- The urine drug screen confirmation from 9/30/24 has been reviewed and the result is appropriate based on patient history and ZION report  --- The patient signed an updated copy of the controlled substance agreement on 8/30/24  --- Continue Hydrocodone. DNF 1/30/25. Patient appears stable with current regimen. No adverse effects. Regarding continuation of opioids, there is no evidence of aberrant behavior or any red flags. The patient continues with appropriate response to opioid therapy. ADL's remain intact by self.   --- Follow-up 1 month       ZION REPORT  As part of the patient's treatment plan, I am prescribing controlled substances. The patient has been made aware of appropriate use of such medications, including potential risk of somnolence, limited ability to drive and/or work safely, and the potential for dependence or overdose. It has also been made clear that these medications are for use by this patient only, without concomitant use of alcohol or other substances unless prescribed.     Patient has completed prescribing agreement detailing terms of continued prescribing of controlled substances, including monitoring ZION reports, urine drug screening, and pill counts if necessary. The patient is aware that  inappropriate use will results in cessation of prescribing such medications.    As the clinician, I personally reviewed the ZION from 1/24/25 while the patient was in the office today.    History and physical exam exhibit continued safe and appropriate use of controlled substances.    Dictated utilizing Dragon dictation.

## 2025-01-31 ENCOUNTER — PRIOR AUTHORIZATION (OUTPATIENT)
Dept: PAIN MEDICINE | Facility: CLINIC | Age: 75
End: 2025-01-31
Payer: COMMERCIAL

## 2025-01-31 NOTE — TELEPHONE ENCOUNTER
Outcome  Approved today by [employer]  Request Reference Number: [id number]. HYDROCO/APAP TAB 10-325MG is approved through 07/31/2025. Your patient may now fill this prescription and it will be covered.  Authorization Expiration Date: 7/31/2025    [place] APPROVED LEFT PT A DETAILED VM.

## 2025-02-28 ENCOUNTER — OFFICE VISIT (OUTPATIENT)
Dept: PAIN MEDICINE | Facility: CLINIC | Age: 75
End: 2025-02-28
Payer: COMMERCIAL

## 2025-02-28 VITALS
WEIGHT: 224.2 LBS | TEMPERATURE: 97.9 F | OXYGEN SATURATION: 92 % | HEART RATE: 92 BPM | DIASTOLIC BLOOD PRESSURE: 74 MMHG | SYSTOLIC BLOOD PRESSURE: 135 MMHG | HEIGHT: 72 IN | BODY MASS INDEX: 30.37 KG/M2

## 2025-02-28 DIAGNOSIS — Z79.899 HIGH RISK MEDICATION USE: ICD-10-CM

## 2025-02-28 DIAGNOSIS — G89.29 CHRONIC LEFT SHOULDER PAIN: Primary | ICD-10-CM

## 2025-02-28 DIAGNOSIS — M25.512 CHRONIC LEFT SHOULDER PAIN: Primary | ICD-10-CM

## 2025-02-28 DIAGNOSIS — G89.4 CHRONIC PAIN SYNDROME: ICD-10-CM

## 2025-02-28 RX ORDER — HYDROCODONE BITARTRATE AND ACETAMINOPHEN 10; 325 MG/1; MG/1
1 TABLET ORAL EVERY 4 HOURS PRN
Qty: 180 TABLET | Refills: 0 | Status: SHIPPED | OUTPATIENT
Start: 2025-02-28

## 2025-02-28 NOTE — PROGRESS NOTES
CHIEF COMPLAINT  Left shoulder pain     Subjective   Gallo Leary is a 75 y.o. male  who presents for follow-up.  He has a history of chronic right shoulder pain. Today pain is 5/10VAS in severity. Pain is located in his left shoulder. Describes this pain as nearly continuous ache. He reports that his pain level has fluctuated since his last office visit. He is recovering from the flu few weeks ago.     Continues with Hydrocodone 10mg 6/day. Denies any side effects from the regimen, including constipation and somnolence. The regimen helps decrease pain by 50%. He reports that pain medications allows him to remain active and continue working. He enjoys playing [email or web address]. Notes improvement in activity and function with regimen. ADL's by self. Denies any bowel or bladder changes.    Shoulder Injury   The left shoulder is affected. The incident occurred more than 1 week ago. The injury mechanism was repetitive motion. The quality of the pain is described as aching (continuous). The pain does not radiate. The pain is at a severity of 5/10 (pain level has fluctuated since his last office visit). The pain is moderate. Pertinent negatives include no chest pain or numbness. The symptoms are aggravated by movement and overhead lifting. He has tried rest, immobilization, heat and ice (Hydrocodone) for the symptoms. The treatment provided mild relief.     PEG Assessment   What number best describes your pain on average in the past week?6  What number best describes how, during the past week, pain has interfered with your enjoyment of life?0  What number best describes how, during the past week, pain has interfered with your general activity?  0    Review of Pertinent Medical Data ---      The following portions of the patient's history were reviewed and updated as appropriate: allergies, current medications, past family history, past medical history, past social history, past surgical history, and problem list.    Review of  "Systems   Constitutional:  Positive for fatigue. Negative for activity change, chills and fever.   HENT:  Positive for congestion.    Eyes:  Negative for visual disturbance.   Respiratory:  Negative for chest tightness and shortness of breath.    Cardiovascular:  Negative for chest pain.   Gastrointestinal:  Negative for abdominal pain, constipation and diarrhea.   Genitourinary:  Negative for difficulty urinating and dysuria.   Musculoskeletal:  Positive for back pain. Negative for neck pain and neck stiffness.   Neurological:  Negative for dizziness, weakness, light-headedness, numbness and headaches.   Psychiatric/Behavioral:  Negative for agitation, self-injury, sleep disturbance and suicidal ideas. The patient is not nervous/anxious.        I have reviewed and confirmed the accuracy of the ROS as documented by the MA/LPN/RN ADIS Rodriguez    Vitals:    02/28/25 1514   BP: 135/74   Pulse: 92   Temp: 97.9 °F (36.6 °C)   SpO2: 92%   Weight: 102 kg (224 lb 3.2 oz)   Height: 182.9 cm (72\")   PainSc: 5    PainLoc: Back     Objective   Physical Exam  Constitutional:       Appearance: Normal appearance.   HENT:      Head: Normocephalic.   Cardiovascular:      Rate and Rhythm: Normal rate and regular rhythm.   Pulmonary:      Effort: Pulmonary effort is normal.      Breath sounds: Normal breath sounds.   Musculoskeletal:      Left shoulder: Tenderness and crepitus present. Decreased range of motion.      Cervical back: Normal range of motion.      Right hip: Tenderness present.      Left hip: Tenderness present.   Skin:     General: Skin is warm and dry.      Capillary Refill: Capillary refill takes less than 2 seconds.   Neurological:      General: No focal deficit present.      Mental Status: He is alert and oriented to person, place, and time.   Psychiatric:         Mood and Affect: Mood normal.         Behavior: Behavior normal.         Thought Content: Thought content normal.         Cognition and Memory: " Cognition normal.       Assessment & Plan   Diagnoses and all orders for this visit:    1. Chronic left shoulder pain (Primary)  -     HYDROcodone-acetaminophen (NORCO)  MG per tablet; Take 1 tablet by mouth Every 4 (Four) Hours As Needed for Severe Pain. 30 day supply. DNF 3/1/25  Dispense: 180 tablet; Refill: 0    2. Chronic pain syndrome  -     HYDROcodone-acetaminophen (NORCO)  MG per tablet; Take 1 tablet by mouth Every 4 (Four) Hours As Needed for Severe Pain. 30 day supply. DNF 3/1/25  Dispense: 180 tablet; Refill: 0    3. High risk medication use    Other orders  -     naloxone (NARCAN) 4 MG/0.1ML nasal spray; Call 911. Don't prime. [place] in 1 nostril for overdose. Repeat in 2-3 minutes in other nostril if no or minimal breathing/responsiveness.  Dispense: 2 each; Refill: 0      Gallo L Stribbling reports a pain score of 5.  Given his pain assessment as noted, treatment options were discussed and the following options were decided upon as a follow-up plan to address the patient's pain: continuation of current treatment plan for pain and prescription for opiod analgesics.    --- The urine drug screen confirmation from 9/30/24 has been reviewed and the result is appropriate based on patient history and ZION report  --- The patient signed an updated copy of the controlled substance agreement on 8/30/24  --- Narcan prescription to pharmacy per protocol   --- Continue Hydrocodone. DNF 3/1/25. Patient appears stable with current regimen. No adverse effects. Regarding continuation of opioids, there is no evidence of aberrant behavior or any red flags. The patient continues with appropriate response to opioid therapy. ADL's remain intact by self.   --- Follow-up 1 month     ZION REPORT  As part of the patient's treatment plan, I am prescribing controlled substances. The patient has been made aware of appropriate use of such medications, including potential risk of somnolence, limited ability to  drive and/or work safely, and the potential for dependence or overdose. It has also been made clear that these medications are for use by this patient only, without concomitant use of alcohol or other substances unless prescribed.     Patient has completed prescribing agreement detailing terms of continued prescribing of controlled substances, including monitoring ZION reports, urine drug screening, and pill counts if necessary. The patient is aware that inappropriate use will results in cessation of prescribing such medications.    As the clinician, I personally reviewed the ZION from 2/28/25 while the patient was in the office today.    History and physical exam exhibit continued safe and appropriate use of controlled substances.    Dictated utilizing Dragon dictation.

## 2025-03-28 ENCOUNTER — OFFICE VISIT (OUTPATIENT)
Dept: PAIN MEDICINE | Facility: CLINIC | Age: 75
End: 2025-03-28
Payer: COMMERCIAL

## 2025-03-28 VITALS
DIASTOLIC BLOOD PRESSURE: 72 MMHG | HEIGHT: 72 IN | BODY MASS INDEX: 30.45 KG/M2 | HEART RATE: 96 BPM | WEIGHT: 224.8 LBS | OXYGEN SATURATION: 94 % | TEMPERATURE: 98.6 F | SYSTOLIC BLOOD PRESSURE: 128 MMHG | RESPIRATION RATE: 20 BRPM

## 2025-03-28 DIAGNOSIS — G89.29 CHRONIC LEFT SHOULDER PAIN: Primary | ICD-10-CM

## 2025-03-28 DIAGNOSIS — Z79.899 HIGH RISK MEDICATION USE: ICD-10-CM

## 2025-03-28 DIAGNOSIS — M25.512 CHRONIC LEFT SHOULDER PAIN: Primary | ICD-10-CM

## 2025-03-28 DIAGNOSIS — G89.4 CHRONIC PAIN SYNDROME: ICD-10-CM

## 2025-03-28 LAB
POC AMPHETAMINES: NEGATIVE
POC BARBITURATES: NEGATIVE
POC BENZODIAZEPHINES: NEGATIVE
POC BUPRENORPHINE: NEGATIVE
POC COCAINE: NEGATIVE
POC METHADONE: NEGATIVE
POC METHAMPHETAMINE SCREEN URINE: NEGATIVE
POC OPIATES: POSITIVE
POC OXYCODONE: NEGATIVE
POC PHENCYCLIDINE: NEGATIVE
POC PROPOXYPHENE: NEGATIVE
POC THC: NEGATIVE
POC TRICYCLIC ANTIDEPRESSANTS: NEGATIVE

## 2025-03-28 RX ORDER — HYDROCODONE BITARTRATE AND ACETAMINOPHEN 10; 325 MG/1; MG/1
1 TABLET ORAL EVERY 4 HOURS PRN
Qty: 180 TABLET | Refills: 0 | Status: SHIPPED | OUTPATIENT
Start: 2025-03-28

## 2025-03-28 NOTE — PROGRESS NOTES
CHIEF COMPLAINT  Shoulder pain    Subjective   Gallo Leary is a 75 y.o. male  who presents for follow-up.  He has a history of chronic right shoulder pain. Today pain is 5/10VAS in severity. Pain is located in his left shoulder. Describes this pain as nearly continuous ache.  He reports that his pain has remained consistent since his last office visit.  Pain is worsened certain positions, weather changes, and overhead reaching.  Pain improves with rest and medication.    Continues with Hydrocodone 10mg 6/day. Denies any side effects from the regimen, including constipation and somnolence. The regimen helps decrease pain by 50%. He reports that pain medications allows him to remain active and continue working. He enjoys playing [employer]. Notes improvement in activity and function with regimen. ADL's by self. Denies any bowel or bladder changes.    No changes to health or medication since his last office visit     Shoulder Injury   The left shoulder is affected. The incident occurred more than 1 week ago. The injury mechanism was repetitive motion. The quality of the pain is described as aching (continuous). The pain does not radiate. The pain is at a severity of 5/10 (pain has remained consistent since his last office visit). The pain is moderate. Pertinent negatives include no chest pain or numbness. The symptoms are aggravated by movement and overhead lifting. He has tried rest, immobilization, heat and ice (Hydrocodone) for the symptoms. The treatment provided mild relief.      PEG Assessment   What number best describes your pain on average in the past week?6  What number best describes how, during the past week, pain has interfered with your enjoyment of life?0  What number best describes how, during the past week, pain has interfered with your general activity?  0    The following portions of the patient's history were reviewed and updated as appropriate: allergies, current medications, past family  "history, past medical history, past social history, past surgical history, and problem list.    Review of Systems   Constitutional:  Negative for activity change, fatigue and fever.   Respiratory:  Negative for cough, chest tightness and shortness of breath.    Cardiovascular:  Negative for chest pain.   Gastrointestinal:  Negative for abdominal pain, constipation and diarrhea.   Neurological:  Negative for dizziness, weakness, light-headedness, numbness and headaches.   Psychiatric/Behavioral:  Negative for agitation, sleep disturbance and suicidal ideas. The patient is not nervous/anxious.        I have reviewed and confirmed the accuracy of the ROS as documented by the MA/LPN/RN ADIS Rodriguez    Vitals:    03/28/25 1543   BP: 128/72   BP Location: Left arm   Patient Position: Sitting   Cuff Size: Adult   Pulse: 96   Resp: 20   Temp: 98.6 °F (37 °C)   TempSrc: Temporal   SpO2: 94%   Weight: 102 kg (224 lb 12.8 oz)   Height: 182.9 cm (72\")   PainSc: 5      Objective   Physical Exam  Constitutional:       Appearance: Normal appearance.   HENT:      Head: Normocephalic.   Cardiovascular:      Rate and Rhythm: Normal rate and regular rhythm.   Pulmonary:      Effort: Pulmonary effort is normal.      Breath sounds: Normal breath sounds.   Musculoskeletal:      Left shoulder: Tenderness and crepitus present. Decreased range of motion.      Cervical back: Normal range of motion.      Right hip: Tenderness present.      Left hip: Tenderness present.   Skin:     General: Skin is warm and dry.      Capillary Refill: Capillary refill takes less than 2 seconds.   Neurological:      General: No focal deficit present.      Mental Status: He is alert and oriented to person, place, and time.   Psychiatric:         Mood and Affect: Mood normal.         Behavior: Behavior normal.         Thought Content: Thought content normal.         Cognition and Memory: Cognition normal.       Assessment & Plan   Diagnoses and all " orders for this visit:    1. Chronic left shoulder pain (Primary)  -     HYDROcodone-acetaminophen (NORCO)  MG per tablet; Take 1 tablet by mouth Every 4 (Four) Hours As Needed for Severe Pain. 30 day supply. DNF 4/1/25  Dispense: 180 tablet; Refill: 0  -     Urine Drug Screen Confirmation - Urine, Clean Catch; Future  -     POC Urine Drug Screen, Triage    2. Chronic pain syndrome  -     HYDROcodone-acetaminophen (NORCO)  MG per tablet; Take 1 tablet by mouth Every 4 (Four) Hours As Needed for Severe Pain. 30 day supply. DNF 4/1/25  Dispense: 180 tablet; Refill: 0  -     Urine Drug Screen Confirmation - Urine, Clean Catch; Future  -     POC Urine Drug Screen, Triage    3. High risk medication use  -     Urine Drug Screen Confirmation - Urine, Clean Catch; Future  -     POC Urine Drug Screen, Triage      Gallo Leary reports a pain score of 5.  Given his pain assessment as noted, treatment options were discussed and the following options were decided upon as a follow-up plan to address the patient's pain: continuation of current treatment plan for pain and prescription for opiod analgesics.    --- Routine UDS in office today as part of monitoring requirements for controlled substances.  The specimen was viewed and the immunoassay result reviewed and is +OPI.  This specimen will be sent to [facility] laboratory for confirmation.     --- The patient signed an updated copy of the controlled substance agreement on 8/30/24  --- Narcan prescription to pharmacy per protocol   --- Continue Hydrocodone. [facility] 4/1/25. Patient appears stable with current regimen. No adverse effects. Regarding continuation of opioids, there is no evidence of aberrant behavior or any red flags. The patient continues with appropriate response to opioid therapy. ADL's remain intact by self.   --- Follow-up 1 month       ZION REPORT  As part of the patient's treatment plan, I am prescribing controlled substances. The patient has  been made aware of appropriate use of such medications, including potential risk of somnolence, limited ability to drive and/or work safely, and the potential for dependence or overdose. It has also been made clear that these medications are for use by this patient only, without concomitant use of alcohol or other substances unless prescribed.     Patient has completed prescribing agreement detailing terms of continued prescribing of controlled substances, including monitoring ZION reports, urine drug screening, and pill counts if necessary. The patient is aware that inappropriate use will results in cessation of prescribing such medications.    As the clinician, I personally reviewed the ZION from 3/28/25 while the patient was in the office today.    History and physical exam exhibit continued safe and appropriate use of controlled substances.    Dictated utilizing Dragon dictation.

## 2025-05-01 ENCOUNTER — OFFICE VISIT (OUTPATIENT)
Dept: PAIN MEDICINE | Facility: CLINIC | Age: 75
End: 2025-05-01
Payer: COMMERCIAL

## 2025-05-01 VITALS
HEIGHT: 72 IN | DIASTOLIC BLOOD PRESSURE: 64 MMHG | OXYGEN SATURATION: 95 % | HEART RATE: 82 BPM | WEIGHT: 227.8 LBS | SYSTOLIC BLOOD PRESSURE: 118 MMHG | TEMPERATURE: 96 F | BODY MASS INDEX: 30.85 KG/M2

## 2025-05-01 DIAGNOSIS — G89.4 CHRONIC PAIN SYNDROME: ICD-10-CM

## 2025-05-01 DIAGNOSIS — Z79.899 HIGH RISK MEDICATION USE: Primary | ICD-10-CM

## 2025-05-01 DIAGNOSIS — M25.512 CHRONIC LEFT SHOULDER PAIN: ICD-10-CM

## 2025-05-01 DIAGNOSIS — G89.29 CHRONIC LEFT SHOULDER PAIN: ICD-10-CM

## 2025-05-01 RX ORDER — HYDROCODONE BITARTRATE AND ACETAMINOPHEN 10; 325 MG/1; MG/1
1 TABLET ORAL EVERY 4 HOURS PRN
Qty: 180 TABLET | Refills: 0 | Status: SHIPPED | OUTPATIENT
Start: 2025-05-01

## 2025-05-01 NOTE — PROGRESS NOTES
CHIEF COMPLAINT  Shoulder pain     Subjective   Gallo Leary is a 75 y.o. male  who presents for follow-up. Today pain is 5/10VAS in severity. He reports that his pain has remained conssitent since his last office visit. Pain is located in his left shoulder. Describes this pain as nearly continuous ache.  He reports that his pain has remained consistent since his last office visit.  Pain is worsened certain positions, weather changes, and overhead reaching.  Pain improves with rest and medication.     Continues with Hydrocodone 10mg 6/day. Denies any side effects from the regimen, including constipation and somnolence. The regimen helps decrease pain by 50%. 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 Denies any bowel or bladder changes.    Shoulder Injury   The left shoulder is affected. The incident occurred more than 1 week ago. The injury mechanism was repetitive motion. The quality of the pain is described as aching (continuous). The pain does not radiate. The pain is at a severity of 5/10. The pain is moderate. Pertinent negatives include no chest pain or numbness. The symptoms are aggravated by movement and overhead lifting. He has tried rest, immobilization, heat and ice (Hydrocodone) for the symptoms. The treatment provided mild relief.     PEG Assessment   What number best describes your pain on average in the past week?5  What number best describes how, during the past week, pain has interfered with your enjoyment of life?0  What number best describes how, during the past week, pain has interfered with your general activity?  0    Review of Pertinent Medical Data ---        The following portions of the patient's history were reviewed and updated as appropriate: allergies, current medications, past family history, past medical history, past social history, past surgical history, and problem list.    Review of Systems  "  Constitutional:  Negative for chills and fever.   Respiratory:  Positive for cough. Negative for shortness of breath.    Cardiovascular:  Negative for chest pain.   Gastrointestinal:  Negative for constipation and diarrhea.   Genitourinary:  Negative for difficulty urinating.   Neurological:  Negative for dizziness, weakness, light-headedness and numbness.   Psychiatric/Behavioral:  Negative for agitation.      I have reviewed and confirmed the accuracy of the ROS as documented by the MA/LPN/RN ADIS Rodriguez    Vitals:    05/01/25 1339   BP: 118/64   BP Location: Left arm   Pulse: 82   Temp: 96 °F (35.6 °C)   TempSrc: Temporal   SpO2: 95%   Weight: 103 kg (227 lb 12.8 oz)   Height: 182.9 cm (72\")   PainSc: 5    PainLoc: Shoulder     Objective   Physical Exam  Constitutional:       Appearance: Normal appearance.   HENT:      Head: Normocephalic.   Cardiovascular:      Rate and Rhythm: Normal rate and regular rhythm.   Pulmonary:      Effort: Pulmonary effort is normal.      Breath sounds: Normal breath sounds.   Musculoskeletal:      Left shoulder: Tenderness and crepitus present. Decreased range of motion.      Cervical back: Normal range of motion.      Right hip: Tenderness present.      Left hip: Tenderness present.   Skin:     General: Skin is warm and dry.      Capillary Refill: Capillary refill takes less than 2 seconds.   Neurological:      General: No focal deficit present.      Mental Status: He is alert and oriented to person, place, and time.   Psychiatric:         Mood and Affect: Mood normal.         Behavior: Behavior normal.         Thought Content: Thought content normal.         Cognition and Memory: Cognition normal.       Assessment & Plan   Diagnoses and all orders for this visit:    1. High risk medication use (Primary)    2. Chronic left shoulder pain  -     HYDROcodone-acetaminophen (NORCO)  MG per tablet; Take 1 tablet by mouth Every 4 (Four) Hours As Needed for Severe Pain. " 30 day supply.  Dispense: 180 tablet; Refill: 0    3. Chronic pain syndrome  -     HYDROcodone-acetaminophen (NORCO)  MG per tablet; Take 1 tablet by mouth Every 4 (Four) Hours As Needed for Severe Pain. 30 day supply.  Dispense: 180 tablet; Refill: 0      Gallo L Stribbling reports a pain score of 5.  Given his pain assessment as noted, treatment options were discussed and the following options were decided upon as a follow-up plan to address the patient's pain: continuation of current treatment plan for pain and prescription for opiod analgesics.    --- The urine drug screen confirmation from 3/28/25 has been reviewed and the result is appropriate based on patient history and ZION report  --- The patient signed an updated copy of the controlled substance agreement on 8/30/24  --- Narcan prescription to pharmacy per protocol   --- Continue Hydrocodone. Patient appears stable with current regimen. No adverse effects. Regarding continuation of opioids, there is no evidence of aberrant behavior or any red flags. The patient continues with appropriate response to opioid therapy. ADL's remain intact by self.   --- Follow-up 1 month     ZION REPORT  As part of the patient's treatment plan, I am prescribing controlled substances. The patient has been made aware of appropriate use of such medications, including potential risk of somnolence, limited ability to drive and/or work safely, and the potential for dependence or overdose. It has also been made clear that these medications are for use by this patient only, without concomitant use of alcohol or other substances unless prescribed.     Patient has completed prescribing agreement detailing terms of continued prescribing of controlled substances, including monitoring ZION reports, urine drug screening, and pill counts if necessary. The patient is aware that inappropriate use will results in cessation of prescribing such medications.    As the clinician, I  personally reviewed the ZION from 5/1/25 while the patient was in the office today.    History and physical exam exhibit continued safe and appropriate use of controlled substances.    Dictated utilizing Dragon dictation.

## 2025-05-22 ENCOUNTER — TELEPHONE (OUTPATIENT)
Dept: PAIN MEDICINE | Facility: CLINIC | Age: 75
End: 2025-05-22
Payer: COMMERCIAL

## 2025-05-22 NOTE — TELEPHONE ENCOUNTER
LVM FOR PATIENT TO CALL BACK ABOUT APPT 6/30/25 WITH ADIS REDMAN WILL NEED TO BE RESCHEDULED SHE WILL BE OUT OF THE OFFICE. OKAY TO SCHEDULE HUB/ PLEASE RESCHEDULED PATIENT WITH ADIS REDMAN WHEN PATIENT CALLS BACK.  
no

## 2025-06-02 ENCOUNTER — OFFICE VISIT (OUTPATIENT)
Dept: PAIN MEDICINE | Facility: CLINIC | Age: 75
End: 2025-06-02
Payer: COMMERCIAL

## 2025-06-02 VITALS
HEART RATE: 81 BPM | HEIGHT: 72 IN | WEIGHT: 224.2 LBS | RESPIRATION RATE: 18 BRPM | OXYGEN SATURATION: 97 % | DIASTOLIC BLOOD PRESSURE: 68 MMHG | SYSTOLIC BLOOD PRESSURE: 125 MMHG | TEMPERATURE: 97.3 F | BODY MASS INDEX: 30.37 KG/M2

## 2025-06-02 DIAGNOSIS — Z79.899 HIGH RISK MEDICATION USE: ICD-10-CM

## 2025-06-02 DIAGNOSIS — G89.29 CHRONIC LEFT SHOULDER PAIN: ICD-10-CM

## 2025-06-02 DIAGNOSIS — M25.512 CHRONIC LEFT SHOULDER PAIN: ICD-10-CM

## 2025-06-02 DIAGNOSIS — G89.4 CHRONIC PAIN SYNDROME: ICD-10-CM

## 2025-06-02 DIAGNOSIS — M54.41 ACUTE RIGHT-SIDED LOW BACK PAIN WITH RIGHT-SIDED SCIATICA: Primary | ICD-10-CM

## 2025-06-02 PROCEDURE — 99214 OFFICE O/P EST MOD 30 MIN: CPT

## 2025-06-02 RX ORDER — HYDROCODONE BITARTRATE AND ACETAMINOPHEN 10; 325 MG/1; MG/1
1 TABLET ORAL EVERY 4 HOURS PRN
Qty: 180 TABLET | Refills: 0 | Status: SHIPPED | OUTPATIENT
Start: 2025-06-02

## 2025-06-02 RX ORDER — METHYLPREDNISOLONE 4 MG/1
TABLET ORAL
Qty: 21 TABLET | Refills: 0 | Status: SHIPPED | OUTPATIENT
Start: 2025-06-02

## 2025-06-02 NOTE — PROGRESS NOTES
CHIEF COMPLAINT  Shoulder pain    Subjective   Gallo Leary is a 75 y.o. male  who presents for follow-up.  He has a history of chronic left shoulder pain. He reports that his pain remained consistent since his last office visit. Rates pain 5/10VAS in severity.  Describes this pain as nearly continuous ache.  He reports that his pain has remained consistent since his last office visit.  Pain is worsened certain positions, weather changes, and overhead reaching.  Pain improves with rest and medication.    New complaint of right sided low back pain with pain that radiates down into right hip and down posterior leg. Pain started 3 weeks ago. He has not tried any heat or ice for this issue. Rates pain 7/10 VAS in severity.      Continues with Hydrocodone 10mg 6/day. Denies any side effects from the regimen, including constipation and somnolence. The regimen helps decrease pain by 50%. 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 Denies any bowel or bladder changes.    Shoulder Injury   The left shoulder is affected. The incident occurred more than 1 week ago. The injury mechanism was repetitive motion. The quality of the pain is described as aching (continuous). The pain does not radiate. The pain is at a severity of 5/10 (pain has remained consistent since his last office visit). The pain is moderate. Pertinent negatives include no chest pain or numbness. The symptoms are aggravated by movement and overhead lifting. He has tried rest, immobilization, heat and ice (Hydrocodone) for the symptoms. The treatment provided mild relief.     PEG Assessment   What number best describes your pain on average in the past week?6  What number best describes how, during the past week, pain has interfered with your enjoyment of life?0  What number best describes how, during the past week, pain has interfered with your general activity?  0    The  "following portions of the patient's history were reviewed and updated as appropriate: allergies, current medications, past family history, past medical history, past social history, past surgical history, and problem list.    Review of Systems   Constitutional:  Negative for activity change (increased) and fever.   Cardiovascular:  Negative for chest pain.   Gastrointestinal:  Negative for constipation and diarrhea.   Musculoskeletal:  Positive for back pain.   Neurological:  Positive for weakness (right leg). Negative for dizziness, light-headedness, numbness and headaches.   Psychiatric/Behavioral:  Negative for agitation, sleep disturbance and suicidal ideas. The patient is not nervous/anxious.      I have reviewed and confirmed the accuracy of the ROS as documented by the MA/LPN/RN ADIS Rodriguez    Vitals:    06/02/25 1545   BP: 125/68   BP Location: Left arm   Patient Position: Sitting   Cuff Size: Adult   Pulse: 81   Resp: 18   Temp: 97.3 °F (36.3 °C)   TempSrc: Temporal   SpO2: 97%   Weight: 102 kg (224 lb 3.2 oz)   Height: 182.9 cm (72\")   PainSc: 7      Objective   Physical Exam  Constitutional:       Appearance: Normal appearance.   HENT:      Head: Normocephalic.   Cardiovascular:      Rate and Rhythm: Normal rate and regular rhythm.   Pulmonary:      Effort: Pulmonary effort is normal.      Breath sounds: Normal breath sounds.   Musculoskeletal:      Left shoulder: Tenderness and crepitus present. Decreased range of motion.      Cervical back: Normal range of motion.      Lumbar back: Tenderness and bony tenderness present. Negative right straight leg raise test and negative left straight leg raise test.        Back:       Right hip: Tenderness present.      Left hip: Tenderness present.   Skin:     General: Skin is warm and dry.      Capillary Refill: Capillary refill takes less than 2 seconds.   Neurological:      General: No focal deficit present.      Mental Status: He is alert and oriented " to person, place, and time.   Psychiatric:         Mood and Affect: Mood normal.         Behavior: Behavior normal.         Thought Content: Thought content normal.         Cognition and Memory: Cognition normal.       Assessment & Plan   Diagnoses and all orders for this visit:    1. Acute right-sided low back pain with right-sided sciatica (Primary)  -     methylPREDNISolone (MEDROL) 4 MG dose pack; Take as directed on package instructions.  Dispense: 21 tablet; Refill: 0    2. Chronic left shoulder pain  -     HYDROcodone-acetaminophen (NORCO)  MG per tablet; Take 1 tablet by mouth Every 4 (Four) Hours As Needed for Severe Pain. 30 day supply.  Dispense: 180 tablet; Refill: 0    3. Chronic pain syndrome  -     HYDROcodone-acetaminophen (NORCO)  MG per tablet; Take 1 tablet by mouth Every 4 (Four) Hours As Needed for Severe Pain. 30 day supply.  Dispense: 180 tablet; Refill: 0    4. High risk medication use      Gallo Leary reports a pain score of 7.  Given his pain assessment as noted, treatment options were discussed and the following options were decided upon as a follow-up plan to address the patient's pain: continuation of current treatment plan for pain and prescription for opiod analgesics.    --- The urine drug screen confirmation from 3/28/25 has been reviewed and the result is appropriate based on patient history and ZION report  --- The patient signed an updated copy of the controlled substance agreement on 8/30/24  --- Narcan prescription to pharmacy per protocol   --- Continue Hydrocodone. Patient appears stable with current regimen. No adverse effects. Regarding continuation of opioids, there is no evidence of aberrant behavior or any red flags. The patient continues with appropriate response to opioid therapy. ADL's remain intact by self.   --- Offered referral to PT and/or x-ray of lumbar spine due to worsening back pain. Patient declined. Will send a MDP to his pharmacy for  flare in pain at this time.   --- Follow-up 1 month     ZION REPORT  As part of the patient's treatment plan, I am prescribing controlled substances. The patient has been made aware of appropriate use of such medications, including potential risk of somnolence, limited ability to drive and/or work safely, and the potential for dependence or overdose. It has also been made clear that these medications are for use by this patient only, without concomitant use of alcohol or other substances unless prescribed.     Patient has completed prescribing agreement detailing terms of continued prescribing of controlled substances, including monitoring ZION reports, urine drug screening, and pill counts if necessary. The patient is aware that inappropriate use will results in cessation of prescribing such medications.    As the clinician, I personally reviewed the ZION from 6/2/25 while the patient was in the office today.    History and physical exam exhibit continued safe and appropriate use of controlled substances.    Dictated utilizing Dragon dictation.

## 2025-06-03 ENCOUNTER — PRIOR AUTHORIZATION (OUTPATIENT)
Dept: PAIN MEDICINE | Facility: CLINIC | Age: 75
End: 2025-06-03
Payer: COMMERCIAL

## 2025-06-03 NOTE — TELEPHONE ENCOUNTER
Outcome  N/A today by [facility]  This medication or product was previously approved on [id number] from 2025-01-31 to 2025-07-31. **Please note: This request was submitted electronically. Formulary lowering, tiering exception, cost reduction and/or pre-benefit determination review (including prospective Medicare hospice reviews) requests cannot be requested using this method of submission. Providers contact us at [phone] for further assistance.    Patient has active prior auth with exp 7-31-25.    I left him a detailed vm.

## 2025-06-26 ENCOUNTER — OFFICE VISIT (OUTPATIENT)
Dept: PAIN MEDICINE | Facility: CLINIC | Age: 75
End: 2025-06-26
Payer: COMMERCIAL

## 2025-06-26 VITALS
TEMPERATURE: 97 F | RESPIRATION RATE: 18 BRPM | HEART RATE: 81 BPM | DIASTOLIC BLOOD PRESSURE: 82 MMHG | OXYGEN SATURATION: 94 % | SYSTOLIC BLOOD PRESSURE: 149 MMHG | BODY MASS INDEX: 29.93 KG/M2 | HEIGHT: 72 IN | WEIGHT: 221 LBS

## 2025-06-26 DIAGNOSIS — M62.838 MUSCLE SPASM: Primary | ICD-10-CM

## 2025-06-26 DIAGNOSIS — Z79.899 HIGH RISK MEDICATION USE: ICD-10-CM

## 2025-06-26 DIAGNOSIS — G89.4 CHRONIC PAIN SYNDROME: ICD-10-CM

## 2025-06-26 DIAGNOSIS — M25.512 CHRONIC LEFT SHOULDER PAIN: ICD-10-CM

## 2025-06-26 DIAGNOSIS — G89.29 CHRONIC LEFT SHOULDER PAIN: ICD-10-CM

## 2025-06-26 DIAGNOSIS — M54.41 ACUTE RIGHT-SIDED LOW BACK PAIN WITH RIGHT-SIDED SCIATICA: ICD-10-CM

## 2025-06-26 RX ORDER — HYDROCODONE BITARTRATE AND ACETAMINOPHEN 10; 325 MG/1; MG/1
1 TABLET ORAL EVERY 4 HOURS PRN
Qty: 180 TABLET | Refills: 0 | Status: SHIPPED | OUTPATIENT
Start: 2025-06-26

## 2025-06-26 NOTE — PROGRESS NOTES
CHIEF COMPLAINT  Shoulder pain    Subjective   Gallo Leary is a 75 y.o. male  who presents for follow-up.  He has a history of chronic left shoulder pain. He reports that his pain level has fluctuated since his last office visit. Rates pain 6/10VAS in severity.  Describes this pain as nearly continuous ache.  He reports that his pain has remained consistent since his last office visit.  Pain is worsened certain positions, weather changes, and overhead reaching.  Pain improves with rest and medication.     He continues to complain of right sided low back pain with pain that radiates into right anterior/lateral thigh. No improvement with MDP. Rates pain 6/10 VAS in severity. Patient would like to trial a muscle relaxant,      Continues with Hydrocodone 10mg 6/day. Denies any side effects from the regimen, including constipation and somnolence. The regimen helps decrease pain by 50%. 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 Denies any bowel or bladder changes.     Shoulder Injury   The left shoulder is affected. The incident occurred more than 1 week ago. The injury mechanism was repetitive motion. The quality of the pain is described as aching (continuous). The pain does not radiate. The pain is at a severity of 6/10 (pain level has flucuated sicne his last office visit). The pain is moderate. Pertinent negatives include no chest pain or numbness. The symptoms are aggravated by movement and overhead lifting. He has tried rest, immobilization, heat and ice (Hydrocodone) for the symptoms. The treatment provided mild relief.   Back Pain  Chronicity:  Recurrent  Onset:  More than 1 month ago  Frequency:  Intermittently  Progression since onset:  Unchanged  Pain location:  Lumbar spine  Pain quality:  Aching  Radiates to:  Right thigh (right lateral/anterior thigh)  Pain-numeric:  6/10  Pain severity:  Moderate  Aggravated by:   "Certain positions and standing  Associated symptoms: no chest pain, no numbness and no weakness    Treatments tried: MDP - no relief.     PEG Assessment   What number best describes your pain on average in the past week?5  What number best describes how, during the past week, pain has interfered with your enjoyment of life?0  What number best describes how, during the past week, pain has interfered with your general activity?  3    Review of Pertinent Medical Data ---      The following portions of the patient's history were reviewed and updated as appropriate: allergies, current medications, past family history, past medical history, past social history, past surgical history, and problem list.    Review of Systems   Constitutional:  Positive for activity change (more).   Cardiovascular:  Negative for chest pain.   Gastrointestinal:  Negative for constipation and diarrhea.   Genitourinary:  Negative for difficulty urinating.   Musculoskeletal:  Positive for back pain.   Neurological:  Negative for weakness and numbness.   Psychiatric/Behavioral:  Negative for self-injury, sleep disturbance and suicidal ideas.      I have reviewed and confirmed the accuracy of the ROS as documented by the MA/LPN/RN ADIS Rodriguez    Vitals:    06/26/25 1458   BP: 149/82   Pulse: 81   Resp: 18   Temp: 97 °F (36.1 °C)   SpO2: 94%   Weight: 100 kg (221 lb)   Height: 182.9 cm (72\")   PainSc: 6      Objective   Physical Exam  Constitutional:       Appearance: Normal appearance.   HENT:      Head: Normocephalic.   Cardiovascular:      Rate and Rhythm: Normal rate and regular rhythm.   Pulmonary:      Effort: Pulmonary effort is normal.      Breath sounds: Normal breath sounds.   Musculoskeletal:      Left shoulder: Tenderness and crepitus present. Decreased range of motion.      Cervical back: Normal range of motion.      Lumbar back: Tenderness and bony tenderness present. Negative right straight leg raise test and negative left " straight leg raise test.        Back:       Right hip: Tenderness present.      Left hip: Tenderness present.   Skin:     General: Skin is warm and dry.      Capillary Refill: Capillary refill takes less than 2 seconds.   Neurological:      General: No focal deficit present.      Mental Status: He is alert and oriented to person, place, and time.   Psychiatric:         Mood and Affect: Mood normal.         Behavior: Behavior normal.         Thought Content: Thought content normal.         Cognition and Memory: Cognition normal.       Assessment & Plan   Diagnoses and all orders for this visit:    1. Muscle spasm (Primary)  -     tiZANidine (ZANAFLEX) 4 MG tablet; Take 1 tablet by mouth 2 (Two) Times a Day As Needed for Muscle Spasms.  Dispense: 45 tablet; Refill: 0    2. Chronic left shoulder pain  -     HYDROcodone-acetaminophen (NORCO)  MG per tablet; Take 1 tablet by mouth Every 4 (Four) Hours As Needed for Severe Pain. 30 day supply.DNF 7/2/25  Dispense: 180 tablet; Refill: 0    3. Acute right-sided low back pain with right-sided sciatica    4. Chronic pain syndrome  -     HYDROcodone-acetaminophen (NORCO)  MG per tablet; Take 1 tablet by mouth Every 4 (Four) Hours As Needed for Severe Pain. 30 day supply.DNF 7/2/25  Dispense: 180 tablet; Refill: 0    5. High risk medication use      Gallo ISMAEL Gibran reports a pain score of 6.  Given his pain assessment as noted, treatment options were discussed and the following options were decided upon as a follow-up plan to address the patient's pain: continuation of current treatment plan for pain and prescription for opiod analgesics.    --- The urine drug screen confirmation from 3/28/25 has been reviewed and the result is appropriate based on patient history and ZION report  --- The patient signed an updated copy of the controlled substance agreement on 8/30/24  --- Narcan prescription to pharmacy per protocol   --- Continue Hydrocodone. DNF 7/2/25.  Patient appears stable with current regimen. No adverse effects. Regarding continuation of opioids, there is no evidence of aberrant behavior or any red flags. The patient continues with appropriate response to opioid therapy. ADL's remain intact by self.   --- Will trial Tizanidine 4mg to see if this is beneficial to his back pain. Discussed medication with the patient.  Included in this discussion was the potential for side effects and adverse events.  Patient verbalized understanding and wished to proceed.  Prescription will be sent to pharmacy.  --- If no improvement to back pain with use of muscle relaxant, will consider updating lumbar imaging at his next office visit. Encouraged patient to utilize heat/ice PRN.   --- Follow-up 1 month     ZION REPORT  As part of the patient's treatment plan, I am prescribing controlled substances. The patient has been made aware of appropriate use of such medications, including potential risk of somnolence, limited ability to drive and/or work safely, and the potential for dependence or overdose. It has also been made clear that these medications are for use by this patient only, without concomitant use of alcohol or other substances unless prescribed.     Patient has completed prescribing agreement detailing terms of continued prescribing of controlled substances, including monitoring ZION reports, urine drug screening, and pill counts if necessary. The patient is aware that inappropriate use will results in cessation of prescribing such medications.    As the clinician, I personally reviewed the ZION from 6/26/25 while the patient was in the office today.    History and physical exam exhibit continued safe and appropriate use of controlled substances.    Dictated utilizing Dragon dictation.

## 2025-07-31 ENCOUNTER — OFFICE VISIT (OUTPATIENT)
Dept: PAIN MEDICINE | Facility: CLINIC | Age: 75
End: 2025-07-31
Payer: COMMERCIAL

## 2025-07-31 VITALS
OXYGEN SATURATION: 96 % | BODY MASS INDEX: 30.48 KG/M2 | SYSTOLIC BLOOD PRESSURE: 125 MMHG | DIASTOLIC BLOOD PRESSURE: 74 MMHG | HEIGHT: 72 IN | TEMPERATURE: 97.3 F | HEART RATE: 84 BPM | WEIGHT: 225 LBS

## 2025-07-31 DIAGNOSIS — G89.4 CHRONIC PAIN SYNDROME: ICD-10-CM

## 2025-07-31 DIAGNOSIS — G89.29 CHRONIC LEFT SHOULDER PAIN: ICD-10-CM

## 2025-07-31 DIAGNOSIS — Z79.899 HIGH RISK MEDICATION USE: ICD-10-CM

## 2025-07-31 DIAGNOSIS — M25.512 CHRONIC LEFT SHOULDER PAIN: ICD-10-CM

## 2025-07-31 DIAGNOSIS — M54.41 ACUTE RIGHT-SIDED LOW BACK PAIN WITH RIGHT-SIDED SCIATICA: Primary | ICD-10-CM

## 2025-07-31 DIAGNOSIS — M62.838 MUSCLE SPASM: ICD-10-CM

## 2025-07-31 RX ORDER — HYDROCODONE BITARTRATE AND ACETAMINOPHEN 10; 325 MG/1; MG/1
1 TABLET ORAL EVERY 4 HOURS PRN
Qty: 180 TABLET | Refills: 0 | Status: SHIPPED | OUTPATIENT
Start: 2025-07-31

## 2025-07-31 NOTE — PROGRESS NOTES
CHIEF COMPLAINT  Back and shoulder pain     Subjective   Gallo Leary is a 75 y.o. male  who presents for follow-up.  He has a history of left shoulder pain. Today pain is 4/10VAS in severity.  Describes this pain as nearly continuous ache. Pain is worsened certain positions, weather changes, and overhead reaching.  Pain improves with rest and medication.     He was started on Tizanidine 4mg PRN at his last office visit and notes improvement to his pain back with this medication. Denies any side effects from this medication. He continues with Hydrocodone 10mg 6/day and IcyHot as needed. Denies any side effects from the regimen, including constipation and somnolence. The regimen helps decrease pain by 50%. 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 Denies any bowel or bladder changes.    He denies any changes to his health or medications since his last office visit.     Shoulder Injury   The left shoulder is affected. The incident occurred more than 1 week ago. The injury mechanism was repetitive motion. The quality of the pain is described as aching (continuous). The pain does not radiate. The pain is at a severity of 4/10 (pain level has flucuated sicne his last office visit). The pain is moderate. Pertinent negatives include no chest pain or numbness. The symptoms are aggravated by movement and overhead lifting. He has tried rest, immobilization, heat and ice (Hydrocodone) for the symptoms. The treatment provided mild relief.   Back Pain  Chronicity:  Recurrent  Onset:  More than 1 month ago  Frequency:  Intermittently  Progression since onset:  Unchanged  Pain location:  Lumbar spine  Pain quality:  Aching  Radiates to:  Right thigh (right lateral/anterior thigh)  Pain-numeric:  4/10  Pain severity:  Moderate  Aggravated by:  Certain positions and standing  Associated symptoms: no abdominal pain, no chest pain, no dysuria, no  "fever, no headaches, no numbness and no weakness    Treatments tried: MDP - no relief.    PEG Assessment   What number best describes your pain on average in the past week?6  What number best describes how, during the past week, pain has interfered with your enjoyment of life?0  What number best describes how, during the past week, pain has interfered with your general activity?  0    The following portions of the patient's history were reviewed and updated as appropriate: allergies, current medications, past family history, past medical history, past social history, past surgical history, and problem list.    Review of Systems   Constitutional:  Positive for activity change (increased). Negative for chills, fatigue and fever.   HENT:  Negative for congestion.    Eyes:  Negative for visual disturbance.   Respiratory:  Negative for chest tightness and shortness of breath.    Cardiovascular:  Negative for chest pain.   Gastrointestinal:  Negative for abdominal pain, constipation and diarrhea.   Genitourinary:  Negative for difficulty urinating and dysuria.   Musculoskeletal:  Positive for back pain.   Neurological:  Negative for dizziness, weakness, light-headedness, numbness and headaches.   Psychiatric/Behavioral:  Negative for agitation, self-injury, sleep disturbance and suicidal ideas. The patient is not nervous/anxious.        I have reviewed and confirmed the accuracy of the ROS as documented by the MA/LPN/RN ADIS Rodriguez    Vitals:    07/31/25 1505   BP: 125/74   Pulse: 84   Temp: 97.3 °F (36.3 °C)   SpO2: 96%   Weight: 102 kg (225 lb)   Height: 182.9 cm (72\")   PainSc: 4    PainLoc: Shoulder     Objective   Physical Exam  Constitutional:       Appearance: Normal appearance.   HENT:      Head: Normocephalic.   Cardiovascular:      Rate and Rhythm: Normal rate and regular rhythm.   Pulmonary:      Effort: Pulmonary effort is normal.      Breath sounds: Normal breath sounds.   Musculoskeletal:      " Left shoulder: Tenderness and crepitus present. Decreased range of motion.      Cervical back: Normal range of motion.      Lumbar back: Tenderness and bony tenderness present. Negative right straight leg raise test and negative left straight leg raise test.   Skin:     General: Skin is warm and dry.      Capillary Refill: Capillary refill takes less than 2 seconds.   Neurological:      General: No focal deficit present.      Mental Status: He is alert and oriented to person, place, and time.   Psychiatric:         Mood and Affect: Mood normal.         Behavior: Behavior normal.         Thought Content: Thought content normal.         Cognition and Memory: Cognition normal.       Assessment & Plan   Diagnoses and all orders for this visit:    1. Acute right-sided low back pain with right-sided sciatica (Primary)    2. Chronic left shoulder pain  -     HYDROcodone-acetaminophen (NORCO)  MG per tablet; Take 1 tablet by mouth Every 4 (Four) Hours As Needed for Severe Pain. 30 day supply.  Dispense: 180 tablet; Refill: 0    3. Chronic pain syndrome  -     HYDROcodone-acetaminophen (NORCO)  MG per tablet; Take 1 tablet by mouth Every 4 (Four) Hours As Needed for Severe Pain. 30 day supply.  Dispense: 180 tablet; Refill: 0    4. Muscle spasm  -     tiZANidine (ZANAFLEX) 4 MG tablet; Take 1 tablet by mouth 2 (Two) Times a Day As Needed for Muscle Spasms.  Dispense: 60 tablet; Refill: 1    5. High risk medication use      Gallo Leary reports a pain score of 4.  Given his pain assessment as noted, treatment options were discussed and the following options were decided upon as a follow-up plan to address the patient's pain: continuation of current treatment plan for pain, prescription for non-opiod analgesics, and prescription for opiod analgesics.    --- The urine drug screen confirmation from 3/28/25 has been reviewed and the result is appropriate based on patient history and ZION report  --- The  patient signed an updated copy of the controlled substance agreement on 8/30/24  --- Narcan prescription current  --- Continue Tizanidine. Refill sent to pharmacy.   --- Continue Hydrocodone.  Patient appears stable with current regimen. No adverse effects. Regarding continuation of opioids, there is no evidence of aberrant behavior or any red flags. The patient continues with appropriate response to opioid therapy. ADL's remain intact by self.   --- Follow-up 1 month       ZION REPORT  As part of the patient's treatment plan, I am prescribing controlled substances. The patient has been made aware of appropriate use of such medications, including potential risk of somnolence, limited ability to drive and/or work safely, and the potential for dependence or overdose. It has also been made clear that these medications are for use by this patient only, without concomitant use of alcohol or other substances unless prescribed.     Patient has completed prescribing agreement detailing terms of continued prescribing of controlled substances, including monitoring ZION reports, urine drug screening, and pill counts if necessary. The patient is aware that inappropriate use will results in cessation of prescribing such medications.    As the clinician, I personally reviewed the ZION from 7/31/25 while the patient was in the office today.    History and physical exam exhibit continued safe and appropriate use of controlled substances.    Dictated utilizing Dragon dictation.

## 2025-08-25 ENCOUNTER — OFFICE VISIT (OUTPATIENT)
Dept: PAIN MEDICINE | Facility: CLINIC | Age: 75
End: 2025-08-25
Payer: COMMERCIAL

## 2025-08-25 VITALS
BODY MASS INDEX: 30.37 KG/M2 | DIASTOLIC BLOOD PRESSURE: 77 MMHG | WEIGHT: 224.2 LBS | TEMPERATURE: 97.3 F | HEART RATE: 96 BPM | OXYGEN SATURATION: 97 % | SYSTOLIC BLOOD PRESSURE: 122 MMHG | RESPIRATION RATE: 20 BRPM | HEIGHT: 72 IN

## 2025-08-25 DIAGNOSIS — M54.41 ACUTE RIGHT-SIDED LOW BACK PAIN WITH RIGHT-SIDED SCIATICA: ICD-10-CM

## 2025-08-25 DIAGNOSIS — Z79.899 HIGH RISK MEDICATION USE: ICD-10-CM

## 2025-08-25 DIAGNOSIS — M25.512 CHRONIC LEFT SHOULDER PAIN: ICD-10-CM

## 2025-08-25 DIAGNOSIS — G89.29 CHRONIC LEFT SHOULDER PAIN: ICD-10-CM

## 2025-08-25 DIAGNOSIS — G89.4 CHRONIC PAIN SYNDROME: ICD-10-CM

## 2025-08-25 DIAGNOSIS — M62.838 MUSCLE SPASM: Primary | ICD-10-CM

## 2025-08-25 PROCEDURE — 99214 OFFICE O/P EST MOD 30 MIN: CPT

## 2025-08-25 RX ORDER — HYDROCODONE BITARTRATE AND ACETAMINOPHEN 10; 325 MG/1; MG/1
1 TABLET ORAL EVERY 4 HOURS PRN
Qty: 180 TABLET | Refills: 0 | Status: SHIPPED | OUTPATIENT
Start: 2025-08-25